# Patient Record
Sex: MALE | Race: BLACK OR AFRICAN AMERICAN | Employment: OTHER | URBAN - METROPOLITAN AREA
[De-identification: names, ages, dates, MRNs, and addresses within clinical notes are randomized per-mention and may not be internally consistent; named-entity substitution may affect disease eponyms.]

---

## 2017-01-05 ENCOUNTER — HOSPITAL ENCOUNTER (OUTPATIENT)
Dept: BONE DENSITY | Age: 82
Discharge: HOME | End: 2017-01-05
Attending: STUDENT IN AN ORGANIZED HEALTH CARE EDUCATION/TRAINING PROGRAM
Payer: MEDICARE

## 2017-01-05 DIAGNOSIS — R26.9 ABNORMALITY OF GAIT: ICD-10-CM

## 2017-01-05 DIAGNOSIS — M84.48XA: ICD-10-CM

## 2017-01-05 PROCEDURE — 77080 DXA BONE DENSITY AXIAL: CPT

## 2017-10-07 ENCOUNTER — APPOINTMENT (OUTPATIENT)
Dept: GENERAL RADIOLOGY | Age: 82
End: 2017-10-07
Attending: PHYSICIAN ASSISTANT
Payer: MEDICARE

## 2017-10-07 ENCOUNTER — HOSPITAL ENCOUNTER (EMERGENCY)
Age: 82
Discharge: HOME | End: 2017-10-07
Attending: EMERGENCY MEDICINE
Payer: MEDICARE

## 2017-10-07 VITALS
HEART RATE: 79 BPM | HEIGHT: 65 IN | DIASTOLIC BLOOD PRESSURE: 82 MMHG | OXYGEN SATURATION: 100 % | BODY MASS INDEX: 25.34 KG/M2 | RESPIRATION RATE: 16 BRPM | WEIGHT: 152.12 LBS | SYSTOLIC BLOOD PRESSURE: 139 MMHG | TEMPERATURE: 98.6 F

## 2017-10-07 DIAGNOSIS — S22.41XA CLOSED FRACTURE OF MULTIPLE RIBS OF RIGHT SIDE, INITIAL ENCOUNTER: Primary | ICD-10-CM

## 2017-10-07 PROCEDURE — 99282 EMERGENCY DEPT VISIT SF MDM: CPT

## 2017-10-07 PROCEDURE — 71101 X-RAY EXAM UNILAT RIBS/CHEST: CPT

## 2017-10-07 RX ORDER — OXYCODONE AND ACETAMINOPHEN 5; 325 MG/1; MG/1
1 TABLET ORAL
Qty: 12 TAB | Refills: 0 | Status: SHIPPED | OUTPATIENT
Start: 2017-10-07 | End: 2017-10-24

## 2017-10-07 NOTE — ED NOTES
PA has reviewed discharge instructions with the patient and spouse. The patient and spouse verbalized understanding. Pt ambulatory home with wife.

## 2017-10-07 NOTE — ED PROVIDER NOTES
[facility] [address]  EMERGENCY DEPARTMENT HISTORY AND PHYSICAL EXAM         Date of Service: 10/7/2017   Patient Name: Kyleigh Hollis   YOB: 1932  Medical Record Number: [id number]    History of Presenting Illness     Chief Complaint   Patient presents with    Rib Injury     right rib cage pain when he fell out of bed yesterday evening when he had a bad dream         History Provided By:  patient    Additional History:   Kyleigh Hollis is a 80 y.o. male with PMhx significant for HTN, HLD and DM who presents ambulatory to the ED with cc of R rib pain s/p fall yesterday afternoon. Pt reports he woke up from a nap and fell out of his bed. Social Hx: - Tobacco, - EtOH, - Illicit Drugs    There are no other complaints, changes or physical findings at this time. Primary Care Provider: Janeth Ontiveros MD   Specialist:    Past History     Past Medical History:   Past Medical History:   Diagnosis Date    Diabetes ([facility] [address]     Gastrointestinal disorder     Hypertension     Other ill-defined conditions(799.89)     gout, glaucoma    Other ill-defined conditions(799.89)     high cholesterol        Past Surgical History:   Past Surgical History:   Procedure Laterality Date    HX ORTHOPAEDIC      cervical surgery        Family History:   No family history on file. Social History:   Social History   Substance Use Topics    Smoking status: Never Smoker    Smokeless tobacco: Not on file    Alcohol use No      Comment: quit        Allergies:   No Known Allergies     Review of Systems   Review of Systems   Constitutional: Negative. Negative for activity change, appetite change, chills, fatigue, fever and unexpected weight change. HENT: Negative. Negative for congestion, hearing loss, rhinorrhea, sneezing and voice change. Eyes: Negative. Negative for pain and visual disturbance. Respiratory: Positive for shortness of breath.  Negative for apnea, cough, choking and chest tightness. Negative for hemoptysis. Cardiovascular: Negative. Negative for chest pain and palpitations. Gastrointestinal: Negative. Negative for abdominal distention, abdominal pain, blood in stool, diarrhea, nausea and vomiting. Genitourinary: Negative. Negative for difficulty urinating, flank pain, frequency and urgency. No discharge   Musculoskeletal: Negative for arthralgias, back pain and neck stiffness. Positive for R rib pain. Skin: Negative. Negative for color change and rash. Neurological: Negative. Negative for dizziness, seizures, syncope, speech difficulty, weakness, numbness and headaches. Hematological: Negative for adenopathy. Psychiatric/Behavioral: Negative. Negative for agitation, behavioral problems, dysphoric mood and suicidal ideas. The patient is not nervous/anxious. Physical Exam  Physical Exam   Constitutional: He is oriented to person, place, and time. He appears well-developed and well-nourished. No distress. HENT:   Head: Normocephalic and atraumatic. Right Ear: External ear normal.   Left Ear: External ear normal.   Nose: Nose normal.   Mouth/Throat: Oropharynx is clear and moist. No oropharyngeal exudate. Eyes: Conjunctivae and EOM are normal. Pupils are equal, round, and reactive to light. Right eye exhibits no discharge. Left eye exhibits no discharge. No scleral icterus. Neck: Normal range of motion. Neck supple. No JVD present. No tracheal deviation present. Cardiovascular: Normal rate, regular rhythm, normal heart sounds and intact distal pulses. Exam reveals no gallop and no friction rub. No murmur heard. Pulmonary/Chest: Effort normal and breath sounds normal. No respiratory distress. He has no wheezes. He has no rales. He exhibits no tenderness. Abdominal: Soft. Bowel sounds are normal. He exhibits no distension and no mass. There is no tenderness.  There is no rebound and no guarding. Musculoskeletal: Normal range of motion. He exhibits no edema or tenderness. No gross deformity. No ecchymosis. Minimal tenderness to posterior lateral ribs. Lymphadenopathy:     He has no cervical adenopathy. Neurological: He is alert and oriented to person, place, and time. He has normal reflexes. No cranial nerve deficit. He exhibits normal muscle tone. Coordination normal.   Skin: Skin is warm and dry. He is not diaphoretic. Psychiatric: He has a normal mood and affect. His behavior is normal. Judgment and thought content normal.   Nursing note and vitals reviewed. Medical Decision Making   I am the first provider for this patient. I reviewed the vital signs, available nursing notes, past medical history, past surgical history, family history and social history. Provider Notes:   DDx: fracture, pneumothorax     ED Course:  2:32 PM   Initial assessment performed. The patients presenting problems have been discussed, and they are in agreement with the care plan formulated and outlined with them. I have encouraged them to ask questions as they arise throughout their visit. Diagnostic Study Results     Radiologic Studies -  The following have been ordered and reviewed:  XR RIBS RT W PA CXR MIN 3 V   Final Result   EXAM:  XR RIBS RT W PA CXR MIN 3 V     INDICATION:   Right rib pain. Fall from bed yesterday.     COMPARISON: 7.24.2014     FINDINGS: A frontal radiograph of the chest and 3 oblique views of the right  ribs demonstrate a healed right anterior fifth rib fracture. There are acute  lateral 10th and 11th rib fractures. There is no pneumothorax or pleural  effusion. Multiple radiopaque structures overlie the left axilla, previously  seen.     IMPRESSION  IMPRESSION:       Acute right lateral 10th and 11th rib fractures  No pneumothorax  Retained metallic fragments left axilla, present in 2014            Vital Signs-Reviewed the patient's vital signs.    Patient Vitals for the past 12 hrs:   Temp Pulse Resp BP SpO2   10/07/17 1417 98.6 °F (37 °C) 79 16 139/82 100 %       Medications Given in the ED:  Medications - No data to display    Diagnosis:  Clinical Impression:   1. Closed fracture of multiple ribs of right side, initial encounter         Plan:  1:   Follow-up Information     Follow up With Details Comments Contact Info    Shelbi Llanos MD In 2 days As needed [facility]  [phone]            2:   Discharge Medication List as of 10/7/2017  3:05 PM      START taking these medications    Details   oxyCODONE-acetaminophen (PERCOCET) 5-325 mg per tablet Take 1 Tab by mouth every six (6) hours as needed for Pain. Max Daily Amount: 4 Tabs., Print, Disp-12 Tab, R-0         CONTINUE these medications which have NOT CHANGED    Details   naproxen (NAPROSYN) 500 mg tablet Take 1 Tab by mouth every twelve (12) hours as needed for Pain., Print, Disp-20 Tab, R-0      insulin NPH (NOVOLIN N, HUMULIN N) 100 unit/mL injection 12 units in AM & 8 units in PM, No Print, Disp-1 Vial, R-0      metoprolol (LOPRESSOR) 50 mg tablet Take 1 Tab by mouth two (2) times a day., Print, Disp-60 Tab, R-0      aspirin delayed-release 325 mg tablet Take 1 Tab by mouth daily. , Print, Disp-30 Tab, R-0      busPIRone (BUSPAR) 30 mg tablet Take 30 mg by mouth two (2) times a day., Historical Med      clonazePAM (KLONOPIN) 1 mg tablet Take 1 mg by mouth nightly as needed (sleep, anxiety). , Historical Med      senna (SENNA) 8.6 mg tablet Take 2 Tabs by mouth nightly., Historical Med      gabapentin (NEURONTIN) 300 mg capsule Take 300 mg by mouth three (3) times daily. , Historical Med      omeprazole (PRILOSEC) 20 mg capsule Take 20 mg by mouth daily. , Historical Med      HYDROcodone-acetaminophen (NORCO)  mg tablet Take 1 Tab by mouth every four (4) hours as needed for Pain., Historical Med      sertraline (ZOLOFT) 100 mg tablet Take 200 mg by mouth daily. , Historical Med      cyanocobalamin (VITAMIN B-12) 1,000 mcg tablet Take 1,000 mcg by mouth daily. , Historical Med      latanoprost (XALATAN) 0.005 % ophthalmic solution Administer 1 Drop to right eye nightly., Historical Med      simvastatin (ZOCOR) 40 mg tablet Take  by mouth nightly.  , Historical Med      docusate sodium (COLACE) 100 mg capsule Take 100 mg by mouth two (2) times a day.  , Historical Med           Return to ED if worse. Disposition:  DISCHARGE NOTE  3:05 PM  The patient has been re-evaluated and is ready for discharge. Reviewed available results with patient. Counseled pt on diagnosis and care plan. Pt has expressed understanding, and all questions have been answered. Pt agrees with plan and agrees to F/U as recommended, or return to the ED if their sxs worsen. Discharge instructions have been provided and explained to the pt, along with reasons to return to the ED.    _______________________________     Attestations: This note is prepared by Marino Stevens, acting as Scribe for [employer] The scribe's documentation has been prepared under my direction and personally reviewed by me in its entirety. I confirm that the note above accurately reflects all work, treatment, procedures, and medical decision making performed by me.   RANDALL Gary    _______________________________

## 2017-10-07 NOTE — ED TRIAGE NOTES
Pt arrived ambulatory to ED with c/o right rib/chest pain s/p falling out of the bed last night. Pt states pain is 9/10 and is worse when moving. Pt states he had a nightmare and rolled out of the bed. Pt denies LOC, though does think he hit his head because he has a small bump on right temporal. Pt in no acute distress. VSS.

## 2017-10-24 ENCOUNTER — APPOINTMENT (OUTPATIENT)
Dept: GENERAL RADIOLOGY | Age: 82
End: 2017-10-24
Attending: EMERGENCY MEDICINE
Payer: MEDICARE

## 2017-10-24 ENCOUNTER — HOSPITAL ENCOUNTER (EMERGENCY)
Age: 82
Discharge: HOME | End: 2017-10-24
Attending: EMERGENCY MEDICINE | Admitting: EMERGENCY MEDICINE
Payer: MEDICARE

## 2017-10-24 VITALS
DIASTOLIC BLOOD PRESSURE: 62 MMHG | BODY MASS INDEX: 24.79 KG/M2 | SYSTOLIC BLOOD PRESSURE: 119 MMHG | HEIGHT: 65 IN | HEART RATE: 98 BPM | WEIGHT: 148.81 LBS | TEMPERATURE: 97.8 F | RESPIRATION RATE: 16 BRPM | OXYGEN SATURATION: 100 %

## 2017-10-24 DIAGNOSIS — M47.22 OSTEOARTHRITIS OF SPINE WITH RADICULOPATHY, CERVICAL REGION: ICD-10-CM

## 2017-10-24 DIAGNOSIS — M19.031 OSTEOARTHRITIS OF RIGHT WRIST, UNSPECIFIED OSTEOARTHRITIS TYPE: Primary | ICD-10-CM

## 2017-10-24 PROCEDURE — 74011250637 HC RX REV CODE- 250/637: Performed by: EMERGENCY MEDICINE

## 2017-10-24 PROCEDURE — 99283 EMERGENCY DEPT VISIT LOW MDM: CPT

## 2017-10-24 PROCEDURE — 72050 X-RAY EXAM NECK SPINE 4/5VWS: CPT

## 2017-10-24 PROCEDURE — 73110 X-RAY EXAM OF WRIST: CPT

## 2017-10-24 RX ORDER — ACETAMINOPHEN 325 MG/1
975 TABLET ORAL
Status: COMPLETED | OUTPATIENT
Start: 2017-10-24 | End: 2017-10-24

## 2017-10-24 RX ORDER — LIDOCAINE 50 MG/G
PATCH TOPICAL
Qty: 15 EACH | Refills: 0 | Status: ON HOLD | OUTPATIENT
Start: 2017-10-24 | End: 2018-09-05

## 2017-10-24 RX ORDER — LIDOCAINE 50 MG/G
1 PATCH TOPICAL
Status: DISCONTINUED | OUTPATIENT
Start: 2017-10-24 | End: 2017-10-24 | Stop reason: HOSPADM

## 2017-10-24 RX ORDER — HYDROCODONE BITARTRATE AND ACETAMINOPHEN 5; 325 MG/1; MG/1
1 TABLET ORAL
Qty: 12 TAB | Refills: 0 | Status: SHIPPED | OUTPATIENT
Start: 2017-10-24 | End: 2018-06-09

## 2017-10-24 RX ADMIN — ACETAMINOPHEN 975 MG: 325 TABLET ORAL at 19:22

## 2017-10-24 NOTE — ED PROVIDER NOTES
[facility] [address]  EMERGENCY DEPARTMENT HISTORY AND PHYSICAL EXAM       Date of Service: 10/24/2017   Patient Name: Ayla Vo   YOB: 1932  Medical Record Number: [id number]    History of Presenting Illness     Chief Complaint   Patient presents with    Hand Pain     R hand pain radiating into his R wrist.  Patient denies injury. Patient states this has been hurting him for over a year        History Provided By:  patient and friend    Additional History:   Ayla Vo is a 80 y.o. male with PMHx significant for DM / HTN / Gout / Glaucoma / High cholesterol who presents ambulatory to the ED with cc of sharp right hand pain that radiates into his right wrist x over a year, worse today. He notes the presence of a painful cyst to the dorsum of his right hand which his PCP has said was \"fat\". Pt endorses that pain constantly radiates into his right shoulder and the right-sided paraspinal muscles in his neck. Per friend, pt had surgery on his neck in 2002. Pt has followed up with his PCP for pain, with PCP not recommending any further alternatives or course of action other than surgery. He denies seeing a spine specialist recently. Pt specifically denies fever, vomiting, chest pain, or SOB. Social Hx: - Tobacco, - EtOH, - Illicit Drugs    There are no other complaints, changes or physical findings at this time. Primary Care Provider: Ann Marie Grullon MD       Past History     Past Medical History:   Past Medical History:   Diagnosis Date    Diabetes ([facility] [address]     Gastrointestinal disorder     Hypertension     Other ill-defined conditions(799.89)     gout, glaucoma    Other ill-defined conditions(799.89)     high cholesterol        Past Surgical History:   Past Surgical History:   Procedure Laterality Date    HX ORTHOPAEDIC      cervical surgery        Family History:   No family history on file.      Social History:   Social History Substance Use Topics    Smoking status: Never Smoker    Smokeless tobacco: Not on file    Alcohol use No      Comment: quit        Allergies:   No Known Allergies      Review of Systems   Review of Systems   Constitutional: Negative for chills and fever. HENT: Negative for congestion. Eyes: Negative for visual disturbance. Respiratory: Negative for chest tightness and shortness of breath. Cardiovascular: Negative for chest pain and leg swelling. Gastrointestinal: Negative for abdominal pain and vomiting. Endocrine: Negative for polyuria. Genitourinary: Negative for dysuria and frequency. Musculoskeletal: Positive for neck pain. Positive for right shoulder, wrist, hand and neck pain   Skin: Negative for color change. Allergic/Immunologic: Negative for immunocompromised state. Neurological: Negative for numbness. Physical Exam  Physical Exam   Nursing note and vitals reviewed.   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, conjunctiva normal, anicteric sclera  HEENT: mucous membranes moist, oropharynx is clear, edentulous, tongue midline, face symmetric, well-healed scar to the posterior C-spine, muscle spasm to the right lateral C-spine, pain to the right cervical spine with right cervical external rotation  Pulmonary: clear to auscultation bilaterally  Cardiac: normal rate and regular rhythm, no murmurs, gallops, or rubs, 2+DP pulses, 2+ radial pulses  Abdomen: soft, nontender, nondistended, bowel sounds present  MSK: no pre-tibial edema, mild tenderness to palpation of the right metacarpal joint, patient lifts right arm with right shoulder elevation, right wrist ROM preserved with pronation, supination, flexion and extension  Neuro: Alert, answers questions appropriately, + right Spurling's test, IR right shoulder 5/5, ER right shoulder 4/5,  symmetric, gait stable w/ cane (baseline)  Skin: capillary refill brisk, suspect ganglion cyst to the right dorsal hand    Medical Decision Making   I am the first provider for this patient. I reviewed the vital signs, available nursing notes, past medical history, past surgical history, family history and social history. Old Medical Records: Old medical records. Nursing notes. Previous radiology studies. Provider Notes:   DDx: Ganglion cyst, Cervical radiculopathy, R wrist OA, Muscular spasm. Critical Care Time:   0 minutes    ED Course:  6:23 PM   Initial assessment performed. The patients presenting problems have been discussed, and they are in agreement with the care plan formulated and outlined with them. I have encouraged them to ask questions as they arise throughout their visit. Progress Notes:   PROGRESS NOTE:  8:30 PM  Pt reevaluated. Pt is ready for discharge. Written by Shy Elkins. Norman Jo ED Scribe, as dictated by Arcelia Mckeon. Bibiana Zambrano MD    Diagnostic Study Results     Radiologic Studies -  The following have been ordered and reviewed:  XR WRIST RT AP/LAT/OBL MIN 3V   Final Result   EXAM: XR WRIST RT AP/LAT/OBL MIN 3V     INDICATION:  R wrist pain, ?ganglion cyst.     COMPARISON: None.     FINDINGS: Three  views of the right wrist demonstrate no fracture or other acute  osseous or articular abnormality. There is extensive arthritic change in the  wrists, with extensive cystic changes in the carpal bones and hypertrophic  change at the base of the thumb. .  There is also involvement of the first  through third MP joints. Extensive vascular calcification is noted.     IMPRESSION   IMPRESSION:  Extensive degenerative changes and vascular calcification. No  acute findings. .   XR SPINE CERV 4 OR 5 V   Final Result   History: Right upper extremity radiculopathy.     AP, lateral, bilateral oblique, and odontoid radiographs of the cervical spine  demonstrate fusion at C5-6.  There is disc space narrowing from C4 through C7. There is anterior osteophyte formation. There is uncinate process hypertrophy  with narrowing of the left seventh-T1 neural foramen.     IMPRESSION  IMPRESSION: Status post fusion. Degenerative disc disease. Vital Signs-Reviewed the patient's vital signs. Patient Vitals for the past 12 hrs:   Temp Pulse Resp BP SpO2   10/24/17 1642 97.8 °F (36.6 °C) 98 16 119/62 100 %       Medications Given in the ED:  Medications   lidocaine (LIDODERM) 5 % patch 1 Patch (1 Patch TransDERmal Apply Patch 10/24/17 1920)   acetaminophen (TYLENOL) tablet 975 mg (975 mg Oral Given 10/24/17 1922)       Pulse Oximetry Analysis - Normal 100% on room air     Diagnosis   Clinical Impression:   1. Osteoarthritis of right wrist, unspecified osteoarthritis type    2. Osteoarthritis of spine with radiculopathy, cervical region         Plan:  1:   Follow-up Information     Follow up With Details Comments Contact Info    Krysta Marie MD Schedule an appointment as soon as possible for a visit in 3 days  [facility] [address]  [phone]      \[facility]\"" EMERGENCY DEPT Go in 1 day If symptoms worsen [address] Lokesh Burns MD Schedule an appointment as soon as possible for a visit in 1 week [facility] [address]  [phone]          2:   Discharge Medication List as of 10/24/2017  8:32 PM      START taking these medications    Details   HYDROcodone-acetaminophen (LORTAB 5-325) 5-325 mg per tablet Take 1 Tab by mouth every six (6) hours as needed for Pain (breakthrough pain). Max Daily Amount: 4 Tabs.  Indications: causes drowsiness;do not drink,drive, or use machinery while taking; take with a stool softener, Print, Disp-12 Tab, R-0      lidocaine (LIDODERM) 5 % Apply patch to the affected area for 12 hours a day and remove for 12 hours a day., Print, Disp-15 Each, R-0 CONTINUE these medications which have NOT CHANGED    Details   naproxen (NAPROSYN) 500 mg tablet Take 1 Tab by mouth every twelve (12) hours as needed for Pain., Print, Disp-20 Tab, R-0      insulin NPH (NOVOLIN N, HUMULIN N) 100 unit/mL injection 12 units in AM & 8 units in PM, No Print, Disp-1 Vial, R-0      metoprolol (LOPRESSOR) 50 mg tablet Take 1 Tab by mouth two (2) times a day., Print, Disp-60 Tab, R-0      aspirin delayed-release 325 mg tablet Take 1 Tab by mouth daily. , Print, Disp-30 Tab, R-0      busPIRone (BUSPAR) 30 mg tablet Take 30 mg by mouth two (2) times a day., Historical Med      clonazePAM (KLONOPIN) 1 mg tablet Take 1 mg by mouth nightly as needed (sleep, anxiety). , Historical Med      senna (SENNA) 8.6 mg tablet Take 2 Tabs by mouth nightly., Historical Med      gabapentin (NEURONTIN) 300 mg capsule Take 300 mg by mouth three (3) times daily. , Historical Med      omeprazole (PRILOSEC) 20 mg capsule Take 20 mg by mouth daily. , Historical Med      sertraline (ZOLOFT) 100 mg tablet Take 200 mg by mouth daily. , Historical Med      cyanocobalamin (VITAMIN B-12) 1,000 mcg tablet Take 1,000 mcg by mouth daily. , Historical Med      latanoprost (XALATAN) 0.005 % ophthalmic solution Administer 1 Drop to right eye nightly., Historical Med      simvastatin (ZOCOR) 40 mg tablet Take  by mouth nightly.  , Historical Med      docusate sodium (COLACE) 100 mg capsule Take 100 mg by mouth two (2) times a day.  , Historical Med         STOP taking these medications       oxyCODONE-acetaminophen (PERCOCET) 5-325 mg per tablet Comments:   Reason for Stopping:         HYDROcodone-acetaminophen (NORCO)  mg tablet Comments:   Reason for Stopping:             Return to ED if Worse    Disposition Note:    DISCHARGE NOTE:  8:35 PM  The patient's results have been reviewed with family and/or caregiver.  They verbally convey their understanding and agreement of the patient's signs, symptoms, diagnosis, treatment, and prognosis. They additionally agree to follow up as recommended in the discharge instructions or to return to the Emergency Room should the patient's condition change prior to their follow-up appointment. The family and/or caregiver verbally agrees with the care-plan and all of their questions have been answered. The discharge instructions have also been provided to the them along with educational information regarding the patient's diagnosis and a list of reasons why the patient would want to return to the ER prior to their follow-up appointment should their condition change. Written by Mina Esposito. Cristy Clay ED Scribe, as dictated by Fady Irizarry. Betzy Rosas MD.  _______________________________   Attestations: This note is prepared by Mina Esposito. Braulio, acting as Scribe for Fady Irizarry. Betzy Rosas MD.    Fady Irizarry. Betzy Rosas MD: The scribe's documentation has been prepared under my direction and personally reviewed by me in its entirety.  I confirm that the note above accurately reflects all work, treatment, procedures, and medical decision making performed by me.   _______________________________

## 2017-10-24 NOTE — ED NOTES
Bedside shift change report given to RN Rohan Hoang (oncoming nurse) by CURT Griffin (offgoing nurse). Report included the following information SBAR, ED Summary and MAR.

## 2017-10-25 NOTE — ED NOTES
Sampson Sahu MD   has done a bedside review of the discharge instructions. The patient is in understanding. The patients line(s) are removed. The patient is dressed, and belongings together for discharge.

## 2017-10-25 NOTE — DISCHARGE INSTRUCTIONS
Osteoarthritis: Care Instructions  Your Care Instructions    Arthritis is a common health problem in which the joints are inflamed. There are several kinds of arthritis. Osteoarthritis is caused by a breakdown of cartilage, the hard, thick tissue that cushions the joints. It causes pain, stiffness, and swelling, often in the spine, fingers, hips, and knees. Osteoarthritis can happen at any age, but it is most common in older people. Osteoarthritis never goes away completely, but it can be controlled. Medicine and home treatment can reduce the pain and prevent the arthritis from getting worse. Follow-up care is a key part of your treatment and safety. Be sure to make and go to all appointments, and call your doctor if you are having problems. It's also a good idea to know your test results and keep a list of the medicines you take. How can you care for yourself at home? · Take a warm shower or bath in the morning to relieve stiffness. Avoid sitting still afterwards. · If the joint is not swollen, use moist heat, like a warm, damp towel, for 20 to 30 minutes, 2 or 3 times a day. Do not use heat on a swollen joint. · If the joint is swollen, use ice or cold packs for 10 to 20 minutes, once an hour. Cold will help relieve pain and reduce inflammation. Put a thin cloth between the ice and your skin. · To prevent stiffness, gently move the joint through its full range of motion several times a day. · If the joint hurts, avoid activities that put a strain on it for a few days. Take rest breaks throughout the day. · Get regular exercise. Walking, swimming, yoga, biking, david chi, and water aerobics are good exercises that are gentle on the joints. · Reach and stay at a healthy weight. If you need to lose or maintain weight, regular exercise and a healthy diet will help. Extra weight can strain the joints, especially the knees and hips, and make the pain worse. Losing even a few pounds may help.   · Take pain medicines exactly as directed. ¨ If the doctor gave you a prescription medicine for pain, take it as prescribed. ¨ If you are not taking a prescription pain medicine, ask your doctor if you can take an over-the-counter medicine. When should you call for help? Call your doctor now or seek immediate medical care if:  · The pain is so bad that you cannot use the joint. · You have sudden back pain with weakness in your legs or loss of bowel or bladder control. · Your stools are black and tarlike or have streaks of blood. · You have severe pain and swelling in more than one joint. Watch closely for changes in your health, and be sure to contact your doctor if:  · You have side effects from the medicines, like belly pain, ongoing heartburn, or nausea. · Joint pain continues for more than 6 weeks, and home treatment is not helping. Where can you learn more? Go to http://marandaCater to uwilly.info/. Enter J423 in the search box to learn more about \"Osteoarthritis: Care Instructions. \"  Current as of: November 28, 2016  Content Version: 11.3  © 9048-3823 [employer]. Care instructions adapted under license by [employer] (which disclaims liability or warranty for this information). If you have questions about a medical condition or this instruction, always ask your healthcare professional. [address] any warranty or liability for your use of this information. Pinched Nerve in the Neck: Care Instructions  Your Care Instructions  A pinched nerve in the neck happens when a vertebra or disc in the upper part of your spine is damaged. This damage can happen because of an injury. Or it can just happen with age. The changes caused by the damage may put pressure on a nearby nerve root, pinching it. This causes symptoms such as sharp pain in your neck, shoulder, arm, or back. You may also have tingling or numbness. Sometimes it makes your arm weaker.  The symptoms are usually worse when you turn your head or strain your neck. For many people, the symptoms get better over time and finally go away. Early treatment usually includes medicines for pain and swelling. Sometimes physical therapy and special exercises may help. Follow-up care is a key part of your treatment and safety. Be sure to make and go to all appointments, and call your doctor if you are having problems. It's also a good idea to know your test results and keep a list of the medicines you take. How can you care for yourself at home? · Be safe with medicines. Read and follow all instructions on the label. ¨ If the doctor gave you a prescription medicine for pain, take it as prescribed. ¨ If you are not taking a prescription pain medicine, ask your doctor if you can take an over-the-counter medicine. · Try using a heating pad on a low or medium setting for 15 to 20 minutes every 2 or 3 hours. Try a warm shower in place of one session with the heating pad. You can also buy single-use heat wraps that last up to 8 hours. · You can also try an ice pack for 10 to 15 minutes every 2 to 3 hours. There isn't strong evidence that either heat or ice will help. But you can try them to see if they help you. · Don't spend too long in one position. Take short breaks to move around and change positions. · Wear a seat belt and shoulder harness when you are in a car. · Sleep with a pillow under your head and neck that keeps your neck straight. · If you were given a neck brace (cervical collar) to limit neck motion, wear it as instructed for as many days as your doctor tells you to. Do not wear it longer than you were told to. Wearing a brace for too long can lead to neck stiffness and can weaken the neck muscles. · Follow your doctor's instructions for gentle neck-stretching exercises. · Do not smoke. Smoking can slow healing of your discs.  If you need help quitting, talk to your doctor about stop-smoking programs and medicines. These can increase your chances of quitting for good. · Avoid strenuous work or exercise until your doctor says it is okay. When should you call for help? Call 911 anytime you think you may need emergency care. For example, call if:  · You are unable to move an arm or a leg at all. Call your doctor now or seek immediate medical care if:  · You have new or worse symptoms in your arms, legs, chest, belly, or buttocks. Symptoms may include:  ¨ Numbness or tingling. ¨ Weakness. ¨ Pain. · You lose bladder or bowel control. Watch closely for changes in your health, and be sure to contact your doctor if:  · You are not getting better as expected. Where can you learn more? Go to http://maranda-willy.info/. Enter O876 in the search box to learn more about \"Pinched Nerve in the Neck: Care Instructions. \"  Current as of: March 21, 2017  Content Version: 11.3  © 9134-7908 [employer]. Care instructions adapted under license by [employer] (which disclaims liability or warranty for this information). If you have questions about a medical condition or this instruction, always ask your healthcare professional. [place] [address] any warranty or liability for your use of this information.

## 2018-05-12 ENCOUNTER — APPOINTMENT (OUTPATIENT)
Dept: CT IMAGING | Age: 83
End: 2018-05-12
Attending: EMERGENCY MEDICINE
Payer: MEDICARE

## 2018-05-12 ENCOUNTER — HOSPITAL ENCOUNTER (EMERGENCY)
Age: 83
Discharge: HOME | End: 2018-05-12
Attending: EMERGENCY MEDICINE
Payer: MEDICARE

## 2018-05-12 ENCOUNTER — APPOINTMENT (OUTPATIENT)
Dept: GENERAL RADIOLOGY | Age: 83
End: 2018-05-12
Attending: PHYSICIAN ASSISTANT
Payer: MEDICARE

## 2018-05-12 VITALS
WEIGHT: 153.66 LBS | BODY MASS INDEX: 25.57 KG/M2 | SYSTOLIC BLOOD PRESSURE: 148 MMHG | TEMPERATURE: 97.8 F | RESPIRATION RATE: 17 BRPM | DIASTOLIC BLOOD PRESSURE: 90 MMHG | OXYGEN SATURATION: 100 % | HEART RATE: 55 BPM

## 2018-05-12 DIAGNOSIS — R51.9 ACUTE NONINTRACTABLE HEADACHE, UNSPECIFIED HEADACHE TYPE: Primary | ICD-10-CM

## 2018-05-12 LAB
ALBUMIN SERPL-MCNC: 3.6 G/DL (ref 3.5–5)
ALBUMIN/GLOB SERPL: 1 {RATIO} (ref 1.1–2.2)
ALP SERPL-CCNC: 144 U/L (ref 45–117)
ALT SERPL-CCNC: 50 U/L (ref 12–78)
ANION GAP SERPL CALC-SCNC: 6 MMOL/L (ref 5–15)
AST SERPL-CCNC: 54 U/L (ref 15–37)
BASOPHILS # BLD: 0 K/UL (ref 0–0.1)
BASOPHILS NFR BLD: 1 % (ref 0–1)
BILIRUB SERPL-MCNC: 0.5 MG/DL (ref 0.2–1)
BUN SERPL-MCNC: 13 MG/DL (ref 6–20)
BUN/CREAT SERPL: 8 (ref 12–20)
CALCIUM SERPL-MCNC: 8.5 MG/DL (ref 8.5–10.1)
CHLORIDE SERPL-SCNC: 106 MMOL/L (ref 97–108)
CK MB CFR SERPL CALC: 1.3 % (ref 0–2.5)
CK MB SERPL-MCNC: 5.4 NG/ML (ref 5–25)
CK SERPL-CCNC: 406 U/L (ref 39–308)
CO2 SERPL-SCNC: 27 MMOL/L (ref 21–32)
CREAT SERPL-MCNC: 1.73 MG/DL (ref 0.7–1.3)
DIFFERENTIAL METHOD BLD: ABNORMAL
EOSINOPHIL # BLD: 0.1 K/UL (ref 0–0.4)
EOSINOPHIL NFR BLD: 3 % (ref 0–7)
ERYTHROCYTE [DISTWIDTH] IN BLOOD BY AUTOMATED COUNT: 12.4 % (ref 11.5–14.5)
GLOBULIN SER CALC-MCNC: 3.7 G/DL (ref 2–4)
GLUCOSE SERPL-MCNC: 142 MG/DL (ref 65–100)
HCT VFR BLD AUTO: 34.6 % (ref 36.6–50.3)
HGB BLD-MCNC: 11.8 G/DL (ref 12.1–17)
IMM GRANULOCYTES # BLD: 0 K/UL (ref 0–0.04)
IMM GRANULOCYTES NFR BLD AUTO: 0 % (ref 0–0.5)
LYMPHOCYTES # BLD: 1.6 K/UL (ref 0.8–3.5)
LYMPHOCYTES NFR BLD: 38 % (ref 12–49)
MCH RBC QN AUTO: 30.3 PG (ref 26–34)
MCHC RBC AUTO-ENTMCNC: 34.1 G/DL (ref 30–36.5)
MCV RBC AUTO: 88.9 FL (ref 80–99)
MONOCYTES # BLD: 0.4 K/UL (ref 0–1)
MONOCYTES NFR BLD: 9 % (ref 5–13)
NEUTS SEG # BLD: 2.1 K/UL (ref 1.8–8)
NEUTS SEG NFR BLD: 49 % (ref 32–75)
NRBC # BLD: 0 K/UL (ref 0–0.01)
NRBC BLD-RTO: 0 PER 100 WBC
PLATELET # BLD AUTO: 154 K/UL (ref 150–400)
PMV BLD AUTO: 10 FL (ref 8.9–12.9)
POTASSIUM SERPL-SCNC: 4.3 MMOL/L (ref 3.5–5.1)
PROT SERPL-MCNC: 7.3 G/DL (ref 6.4–8.2)
RBC # BLD AUTO: 3.89 M/UL (ref 4.1–5.7)
SODIUM SERPL-SCNC: 139 MMOL/L (ref 136–145)
TROPONIN I SERPL-MCNC: <0.04 NG/ML
WBC # BLD AUTO: 4.2 K/UL (ref 4.1–11.1)

## 2018-05-12 PROCEDURE — 84484 ASSAY OF TROPONIN QUANT: CPT | Performed by: PHYSICIAN ASSISTANT

## 2018-05-12 PROCEDURE — 74011250637 HC RX REV CODE- 250/637: Performed by: EMERGENCY MEDICINE

## 2018-05-12 PROCEDURE — 80053 COMPREHEN METABOLIC PANEL: CPT | Performed by: PHYSICIAN ASSISTANT

## 2018-05-12 PROCEDURE — 96360 HYDRATION IV INFUSION INIT: CPT

## 2018-05-12 PROCEDURE — 99284 EMERGENCY DEPT VISIT MOD MDM: CPT

## 2018-05-12 PROCEDURE — 82550 ASSAY OF CK (CPK): CPT | Performed by: PHYSICIAN ASSISTANT

## 2018-05-12 PROCEDURE — 74011250636 HC RX REV CODE- 250/636: Performed by: EMERGENCY MEDICINE

## 2018-05-12 PROCEDURE — 85025 COMPLETE CBC W/AUTO DIFF WBC: CPT | Performed by: PHYSICIAN ASSISTANT

## 2018-05-12 PROCEDURE — 93005 ELECTROCARDIOGRAM TRACING: CPT

## 2018-05-12 PROCEDURE — 36415 COLL VENOUS BLD VENIPUNCTURE: CPT | Performed by: PHYSICIAN ASSISTANT

## 2018-05-12 PROCEDURE — 70450 CT HEAD/BRAIN W/O DYE: CPT

## 2018-05-12 PROCEDURE — 71045 X-RAY EXAM CHEST 1 VIEW: CPT

## 2018-05-12 RX ORDER — NAPROXEN 250 MG/1
250 TABLET ORAL
Qty: 6 TAB | Refills: 0 | Status: ON HOLD | OUTPATIENT
Start: 2018-05-12 | End: 2018-09-05

## 2018-05-12 RX ORDER — NAPROXEN 250 MG/1
250 TABLET ORAL 2 TIMES DAILY WITH MEALS
Status: DISCONTINUED | OUTPATIENT
Start: 2018-05-12 | End: 2018-05-12 | Stop reason: HOSPADM

## 2018-05-12 RX ADMIN — NAPROXEN 250 MG: 250 TABLET ORAL at 16:48

## 2018-05-12 RX ADMIN — SODIUM CHLORIDE 500 ML: 900 INJECTION, SOLUTION INTRAVENOUS at 17:46

## 2018-05-12 NOTE — DISCHARGE INSTRUCTIONS

## 2018-05-12 NOTE — ED PROVIDER NOTES
EMERGENCY DEPARTMENT HISTORY AND PHYSICAL EXAM      Date: 5/12/2018  Patient Name: Milagro Morales     PROVIDER IN TRIAGE NOTE:  3:34 PM  DONNA Bey  has evaluated the patient as the Provider in Triage (PIT) for headache and eye pain that radiates to the left neck and chest. The vital signs and the triage nurse assessment have been reviewed. The patient and any available family have been advised that the appropriate studies have been ordered to initiate the work up based on the clinical presentation during the assessment. The pt has been advised that they will be accommodated in monitored ED bed as soon as possible. The pt has been requested to contact the triage nurse or PIT immediately if they experiences any changes in their condition during this brief waiting period. History of Presenting Illness     Chief Complaint   Patient presents with    Headache     headached that started yesterday     Chest Pain     started yesterday        History Provided By: Patient    HPI: Milagro Morales, 80 y.o. male with PMHx significant for DM, HTN, presents ambulatory to the ED with cc of a moderate, top to L sided, headache since yesterday. He denies any associated symptoms, but notes a sore L ear pain s/p having it \"washed out\" at [facility] and a mild swelling to his legs. Per triage note, the pt had also complained of CP, but during the exam, the pt denies this. Pt states he was not performing any heavy lifting at the time of the headache, but notes he was \"around the house. \" He denies self treating for the headache, but states he had only taken his regime of chronic medications, to which his wife states it included Percocet. She also reports the pt is currently on ASA daily. 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 Pt denies any nausea, vomiting, fever, or CP.     Chief Complaint: headache  Duration: 1 Days  Timing:  N/A  Location: top and L sided  Quality: N/A  Severity: Moderate  Modifying Factors: N/A  Associated Symptoms: denies any other associated signs or symptoms    There are no other complaints, changes, or physical findings at this time. PCP: Sindhu Gilliam MD    Current Facility-Administered Medications   Medication Dose Route Frequency Provider Last Rate Last Dose    naproxen (NAPROSYN) tablet 250 mg  250 mg Oral BID WITH MEALS Mukul Licea MD   250 mg at 05/12/18 1646     Current Outpatient Prescriptions   Medication Sig Dispense Refill    naproxen (NAPROSYN) 250 mg tablet Take 1 Tab by mouth every twelve (12) hours as needed. 6 Tab 0    HYDROcodone-acetaminophen (LORTAB 5-325) 5-325 mg per tablet Take 1 Tab by mouth every six (6) hours as needed for Pain (breakthrough pain). Max Daily Amount: 4 Tabs. Indications: causes drowsiness;do not drink,drive, or use machinery while taking; take with a stool softener 12 Tab 0    lidocaine (LIDODERM) 5 % Apply patch to the affected area for 12 hours a day and remove for 12 hours a day. 15 Each 0    insulin NPH (NOVOLIN N, HUMULIN N) 100 unit/mL injection 12 units in AM & 8 units in PM 1 Vial 0    metoprolol (LOPRESSOR) 50 mg tablet Take 1 Tab by mouth two (2) times a day. 60 Tab 0    aspirin delayed-release 325 mg tablet Take 1 Tab by mouth daily. 30 Tab 0    busPIRone (BUSPAR) 30 mg tablet Take 30 mg by mouth two (2) times a day.  clonazePAM (KLONOPIN) 1 mg tablet Take 1 mg by mouth nightly as needed (sleep, anxiety).  senna (SENNA) 8.6 mg tablet Take 2 Tabs by mouth nightly.  gabapentin (NEURONTIN) 300 mg capsule Take 300 mg by mouth three (3) times daily.  omeprazole (PRILOSEC) 20 mg capsule Take 20 mg by mouth daily.  sertraline (ZOLOFT) 100 mg tablet Take 200 mg by mouth daily.  cyanocobalamin (VITAMIN B-12) 1,000 mcg tablet Take 1,000 mcg by mouth daily.  latanoprost (XALATAN) 0.005 % ophthalmic solution Administer 1 Drop to right eye nightly.       simvastatin (ZOCOR) 40 mg tablet Take  by mouth nightly.  docusate sodium (COLACE) 100 mg capsule Take 100 mg by mouth two (2) times a day. Past History     Past Medical History:  Past Medical History:   Diagnosis Date    Diabetes ([address]     Gastrointestinal disorder     Hypertension     Other ill-defined conditions(799.89)     gout, glaucoma    Other ill-defined conditions(799.89)     high cholesterol       Past Surgical History:  Past Surgical History:   Procedure Laterality Date    HX ORTHOPAEDIC      cervical surgery       Family History:  History reviewed. No pertinent family history. Social History:  Social History   Substance Use Topics    Smoking status: Never Smoker    Smokeless tobacco: None    Alcohol use No      Comment: quit       Allergies:  No Known Allergies    Review of Systems   Review of Systems   Constitutional: Negative for chills and fever. HENT: Positive for ear pain (+L). Negative for congestion, rhinorrhea, sore throat and trouble swallowing. Eyes: Negative for visual disturbance. Respiratory: Negative for cough, chest tightness and shortness of breath. Cardiovascular: Positive for leg swelling. Negative for chest pain and palpitations. Gastrointestinal: Negative for abdominal pain, blood in stool, constipation, diarrhea, nausea and vomiting. Genitourinary: Negative for decreased urine volume, difficulty urinating, dysuria and frequency. Musculoskeletal: Negative for back pain and neck pain. Skin: Negative for color change and rash. Neurological: Positive for headaches. Negative for dizziness, weakness and light-headedness. Physical Exam   Physical Exam   Constitutional: He is oriented to person, place, and time. Vital signs are normal. He appears well-developed and well-nourished. He does not appear ill. No distress.    HENT:   Mouth/Throat: Oropharynx is clear and moist.   Tenderness in the L temporal scalp   Eyes: Conjunctivae are normal. Neck: Neck supple. Cardiovascular: Normal rate and regular rhythm. Pulmonary/Chest: Effort normal and breath sounds normal. No accessory muscle usage. No respiratory distress. Abdominal: Soft. He exhibits no distension. There is no tenderness. Lymphadenopathy:     He has no cervical adenopathy. Neurological: He is alert and oriented to person, place, and time. He has normal strength. No cranial nerve deficit or sensory deficit. Skin: Skin is warm and dry. Nursing note and vitals reviewed. Diagnostic Study Results     Labs -   Recent Results (from the past 12 hour(s))   EKG, 12 LEAD, INITIAL    Collection Time: 05/12/18  3:36 PM   Result Value Ref Range    Ventricular Rate 64 BPM    Atrial Rate 64 BPM    P-R Interval 190 ms    QRS Duration 66 ms    Q-T Interval 396 ms    QTC Calculation (Bezet) 408 ms    Calculated R Axis -166 degrees    Calculated T Axis 174 degrees    Diagnosis       Normal sinus rhythm  Right superior axis deviation  When compared with ECG of 24-JUL-2014 10:21,  Questionable change in QRS axis  Nonspecific T wave abnormality, worse in Inferior leads     CBC WITH AUTOMATED DIFF    Collection Time: 05/12/18  4:39 PM   Result Value Ref Range    WBC 4.2 4.1 - 11.1 K/uL    RBC 3.89 (L) 4.10 - 5.70 M/uL    HGB 11.8 (L) 12.1 - 17.0 g/dL    HCT 34.6 (L) 36.6 - 50.3 %    MCV 88.9 80.0 - 99.0 FL    MCH 30.3 26.0 - 34.0 PG    MCHC 34.1 30.0 - 36.5 g/dL    RDW 12.4 11.5 - 14.5 %    PLATELET 697 981 - 714 K/uL    MPV 10.0 8.9 - 12.9 FL    NRBC 0.0 0  WBC    ABSOLUTE NRBC 0.00 0.00 - 0.01 K/uL    NEUTROPHILS 49 32 - 75 %    LYMPHOCYTES 38 12 - 49 %    MONOCYTES 9 5 - 13 %    EOSINOPHILS 3 0 - 7 %    BASOPHILS 1 0 - 1 %    IMMATURE GRANULOCYTES 0 0.0 - 0.5 %    ABS. NEUTROPHILS 2.1 1.8 - 8.0 K/UL    ABS. LYMPHOCYTES 1.6 0.8 - 3.5 K/UL    ABS. MONOCYTES 0.4 0.0 - 1.0 K/UL    ABS. EOSINOPHILS 0.1 0.0 - 0.4 K/UL    ABS. BASOPHILS 0.0 0.0 - 0.1 K/UL    ABS. IMM.  GRANS. 0.0 0.00 - 0.04 K/UL DF AUTOMATED     METABOLIC PANEL, COMPREHENSIVE    Collection Time: 05/12/18  4:39 PM   Result Value Ref Range    Sodium 139 136 - 145 mmol/L    Potassium 4.3 3.5 - 5.1 mmol/L    Chloride 106 97 - 108 mmol/L    CO2 27 21 - 32 mmol/L    Anion gap 6 5 - 15 mmol/L    Glucose 142 (H) 65 - 100 mg/dL    BUN 13 6 - 20 MG/DL    Creatinine 1.73 (H) 0.70 - 1.30 MG/DL    BUN/Creatinine ratio 8 (L) 12 - 20      GFR est AA 46 (L) >60 ml/min/1.73m2    GFR est non-AA 38 (L) >60 ml/min/1.73m2    Calcium 8.5 8.5 - 10.1 MG/DL    Bilirubin, total 0.5 0.2 - 1.0 MG/DL    ALT (SGPT) 50 12 - 78 U/L    AST (SGOT) 54 (H) 15 - 37 U/L    Alk. phosphatase 144 (H) 45 - 117 U/L    Protein, total 7.3 6.4 - 8.2 g/dL    Albumin 3.6 3.5 - 5.0 g/dL    Globulin 3.7 2.0 - 4.0 g/dL    A-G Ratio 1.0 (L) 1.1 - 2.2     TROPONIN I    Collection Time: 05/12/18  4:39 PM   Result Value Ref Range    Troponin-I, Qt. <0.04 <0.05 ng/mL   CK W/ CKMB & INDEX    Collection Time: 05/12/18  4:39 PM   Result Value Ref Range     (H) 39 - 308 U/L    CK - MB 5.4 (H) <3.6 NG/ML    CK-MB Index 1.3 0 - 2.5         Radiologic Studies -   CT Results  (Last 48 hours)               05/12/18 1712  CT HEAD WO CONT Final result    Impression:  Impression:    1. No evidence of acute infarct or intracranial hemorrhage. 2. Left cerebellar infarct, new since prior study; favor subacute or chronic   timeframe. 3. Periventricular white matter disease is likely secondary to chronic small   vessel ischemic changes. Narrative: Indication:  Headache        Comparison: CT 8/11/2015       Findings: 5 mm axial images were obtained from the skull base through the   vertex. CT dose reduction was achieved through the use of a standardized protocol   tailored for this examination and automatic exposure control for dose   modulation. The ventricles and cortical sulci are prominent, compatible with age related   volume loss.  There is no evidence of intracranial hemorrhage, mass, or mass   effect. There is a left cerebellar hemisphere infarct, either subacute or   chronic, but new since prior study. There is periventricular white matter   disease. No extra-axial fluid collections are seen. There is opacification of   portions of the right sphenoid sinus. The orbital structures are unremarkable. No osseous abnormalities are seen. CXR Results  (Last 48 hours)               05/12/18 1613  XR CHEST PORT Final result    Impression:  IMPRESSION: No acute findings. Narrative:  EXAM:  XR CHEST PORT       INDICATION:  Chest Pain       COMPARISON:  Chest x-ray 10/7/2017. FINDINGS: A portable AP radiograph of the chest was obtained at 16:11 hours. The   patient is on a cardiac monitor. The lungs are clear. The cardiac and   mediastinal contours and pulmonary vascularity are normal. There is tortuosity   of the thoracic aorta. The bones and soft tissues are grossly stable with   metallic shot densities overlying the lateral left chest wall and degenerative   spine changes but otherwise within normal limits. Medical Decision Making   I am the first provider for this patient. I reviewed the vital signs, available nursing notes, past medical history, past surgical history, family history and social history. Vital Signs-Reviewed the patient's vital signs. Patient Vitals for the past 12 hrs:   Temp Pulse Resp BP SpO2   05/12/18 1815 - - - 148/90 100 %   05/12/18 1814 - (!) 55 17 - 100 %   05/12/18 1645 - (!) 58 19 132/83 99 %   05/12/18 1531 97.8 °F (36.6 °C) 69 18 122/81 100 %     EKG interpretation: (Preliminary): 1536  Rhythm: normal sinus rhythm; and regular .  Rate (approx.): 64; Axis: right superior axis deviation; DE interval: normal; QRS interval: normal ; ST/T wave: normal.  Written by Jannelle Blizzard, ED Scribe, as dictated by Laura Daniels MD.    Records Reviewed: Nursing Notes, Old Medical Records, Previous electrocardiograms, Previous Radiology Studies and Previous Laboratory Studies    Provider Notes (Medical Decision Making):   Pt presents with a HA in the L temporal area and pain in his L ear. Headache was not acute in onset nor is it the worse HA of his life. Do not suspect any subarrachnoid hemorrhage. There is some suspicion for temporal arteritis, however the pt is already blind in his L eye. Likely tension type HA. Given his age will check head CT for other acute hemorrhage in the brain or mass/lesion. He did mention CP in triage although he denied it to me. I will obtain cardiac enzymes in the event that he is forgetful of his CP. However, since he is not reporting it to me, I have a low suspicion of ACS. ED Course:   Initial assessment performed. The patients presenting problems have been discussed, and they are in agreement with the care plan formulated and outlined with them. I have encouraged them to ask questions as they arise throughout their visit. EKG interpretation: (Preliminary) 15:36  NSR. Rate 64. QRS 66. QT/QTc 396/408. No acute ischemic changes. PROGRESS NOTE:  7:12 PM  Pt has been re-evaluated. Pt was updated on his results and all questions were answered. Critical Care Time:   0    Disposition:  DISCHARGE NOTE  7:12 PM  The patient has been re-evaluated and is ready for discharge. Reviewed available results with patient. Counseled patient on diagnosis and care plan. Patient has expressed understanding, and all questions have been answered. Patient agrees with plan and agrees to follow up as recommended, or return to the ED if their symptoms worsen. Discharge instructions have been provided and explained to the patient, along with reasons to return to the ED. PLAN:  1. Discharge  Current Discharge Medication List      CONTINUE these medications which have CHANGED    Details   naproxen (NAPROSYN) 250 mg tablet Take 1 Tab by mouth every twelve (12) hours as needed.   Qty: 6 Tab, Refills: 0           2. Follow-up Information     Follow up With Details Comments Contact Info    Glenda Bridges MD Schedule an appointment as soon as possible for a visit in 2 days  [address]  [phone]          Return to ED if worse     Diagnosis     Clinical Impression:   1. Acute nonintractable headache, unspecified headache type        Attestations: This note is prepared by Merline Som, acting as Scribe for Margarita Lucero MD.    Margarita Lucero MD: The scribe's documentation has been prepared under my direction and personally reviewed by me in its entirety. I confirm that the note above accurately reflects all work, treatment, procedures, and medical decision making performed by me. This note will not be viewable in [address]

## 2018-05-12 NOTE — ED NOTES
Discharge instructions reviewed w/ pt and copy given by Dr. Talbot Litten. Pt discharged from  ED to care of wife.

## 2018-05-13 LAB
ATRIAL RATE: 64 BPM
CALCULATED R AXIS, ECG10: -166 DEGREES
CALCULATED T AXIS, ECG11: 174 DEGREES
DIAGNOSIS, 93000: NORMAL
P-R INTERVAL, ECG05: 190 MS
Q-T INTERVAL, ECG07: 396 MS
QRS DURATION, ECG06: 66 MS
QTC CALCULATION (BEZET), ECG08: 408 MS
VENTRICULAR RATE, ECG03: 64 BPM

## 2018-06-09 ENCOUNTER — APPOINTMENT (OUTPATIENT)
Dept: CT IMAGING | Age: 83
End: 2018-06-09
Attending: EMERGENCY MEDICINE
Payer: MEDICARE

## 2018-06-09 ENCOUNTER — HOSPITAL ENCOUNTER (EMERGENCY)
Age: 83
Discharge: HOME | End: 2018-06-09
Attending: EMERGENCY MEDICINE | Admitting: EMERGENCY MEDICINE
Payer: MEDICARE

## 2018-06-09 VITALS
SYSTOLIC BLOOD PRESSURE: 148 MMHG | HEIGHT: 65 IN | OXYGEN SATURATION: 100 % | HEART RATE: 53 BPM | TEMPERATURE: 97.7 F | BODY MASS INDEX: 25.33 KG/M2 | RESPIRATION RATE: 18 BRPM | DIASTOLIC BLOOD PRESSURE: 79 MMHG | WEIGHT: 152 LBS

## 2018-06-09 DIAGNOSIS — R51.9 NONINTRACTABLE HEADACHE, UNSPECIFIED CHRONICITY PATTERN, UNSPECIFIED HEADACHE TYPE: Primary | ICD-10-CM

## 2018-06-09 PROCEDURE — 70450 CT HEAD/BRAIN W/O DYE: CPT

## 2018-06-09 PROCEDURE — 99283 EMERGENCY DEPT VISIT LOW MDM: CPT

## 2018-06-09 PROCEDURE — 74011250637 HC RX REV CODE- 250/637: Performed by: EMERGENCY MEDICINE

## 2018-06-09 RX ORDER — TRAZODONE HYDROCHLORIDE 50 MG/1
50 TABLET ORAL
COMMUNITY
End: 2018-10-30

## 2018-06-09 RX ORDER — BUTALBITAL, ACETAMINOPHEN AND CAFFEINE 50; 325; 40 MG/1; MG/1; MG/1
2 TABLET ORAL
Status: COMPLETED | OUTPATIENT
Start: 2018-06-09 | End: 2018-06-09

## 2018-06-09 RX ORDER — ATORVASTATIN CALCIUM 10 MG/1
10 TABLET, FILM COATED ORAL
COMMUNITY
End: 2018-10-30

## 2018-06-09 RX ORDER — OXYCODONE AND ACETAMINOPHEN 5; 325 MG/1; MG/1
1 TABLET ORAL
Status: ON HOLD | COMMUNITY
End: 2018-09-05

## 2018-06-09 RX ORDER — BACLOFEN 20 MG
500 TABLET ORAL DAILY
COMMUNITY
End: 2018-10-30

## 2018-06-09 RX ORDER — BUTALBITAL, ACETAMINOPHEN AND CAFFEINE 50; 325; 40 MG/1; MG/1; MG/1
1 TABLET ORAL
Qty: 6 TAB | Refills: 0 | Status: ON HOLD | OUTPATIENT
Start: 2018-06-09 | End: 2018-09-05

## 2018-06-09 RX ORDER — CLOPIDOGREL BISULFATE 75 MG/1
75 TABLET ORAL DAILY
COMMUNITY
End: 2018-10-30

## 2018-06-09 RX ADMIN — BUTALBITAL, ACETAMINOPHEN AND CAFFEINE 2 TABLET: 50; 325; 40 TABLET ORAL at 16:10

## 2018-06-09 NOTE — ED NOTES
Pt presents to the ED with c/o headache on the side of his head x3 days. Pt reprots taking Excedrin migraine yesterday. Pt denies dizziness. Pt denies vision changes. Pt reports feeling weakness. Pt denies n/v/d. Pt has a hx of migraines. Pt and family are unsure of medications and are poor historians. Pt reports he was recently at [facility] and had a CT of his head performed. Pt is alert and oriented. Pt skin is warm and dry. Pt is ambulatory with a cane. Emergency Department Nursing Plan of Care       The Nursing Plan of Care is developed from the Nursing assessment and Emergency Department Attending provider initial evaluation. The plan of care may be reviewed in the ED Provider note.     The Plan of Care was developed with the following considerations:   Patient / Family readiness to learn indicated by:verbalized understanding  Persons(s) to be included in education: patient  Barriers to Learning/Limitations:No    Signed     Johnathan Cheatham    6/9/2018   3:24 PM

## 2018-06-09 NOTE — DISCHARGE INSTRUCTIONS

## 2018-06-09 NOTE — ED NOTES
Patient  given copy of dc instructions and 1 paper script(s) and 0 electronic scripts. Patient  verbalized understanding of instructions and script (s). Patient given a current medication reconciliation form and verbalized understanding of their medications. Patient  verbalized understanding of the importance of discussing medications with  his or her physician or clinic they will be following up with. Patient alert and oriented and in no acute distress. Patient offered wheelchair from treatment area to hospital entrance, patient declined wheelchair.

## 2018-06-09 NOTE — ED PROVIDER NOTES
EMERGENCY DEPARTMENT HISTORY AND PHYSICAL EXAM      Date: 6/9/2018  Patient Name: Leonela Arnold    History of Presenting Illness     Chief Complaint   Patient presents with    Headache       History Provided By: Patient and Patient's Stepdaughter and Patient's Friend    HPI: Leonela Arnold, 80 y.o. male with PMHx significant for DM, HTN, and GI disorder, presents ambulatory to the ED with cc of persistent, moderate HA that radiates from the top of the pt's head down the side of his face and into his shoulder that started a couple of days ago. Pt's stepdaughter states that the pt visited the ED [facility] ED on 5/12/18 for a similar HA and was seen by radiology for an CXR and a head CT. Pt's friend reports that the head CT found signs of a past stroke. Pt states that he has taken Excedrin and ASA , both of which have not helped his AMAYA. Pt's friend states that the pt's rx of ASA was changed to Plavix. Pt denies a hx of heart attacks. Pt does not endorse smoking tobacco and does not drink alcohol. Chief Complaint: HA  Duration: couple of days ago    Timing:  Constant  Location: head, side of face, shoulder  Quality: N/A  Severity: Moderate  Modifying Factors: ASA and Excedrin did not help with relief. Associated Symptoms: denies any other associated signs or symptoms    There are no other complaints, changes, or physical findings at this time. PCP: Maribel Soto, DO    Current Outpatient Prescriptions   Medication Sig Dispense Refill    atorvastatin (LIPITOR) 10 mg tablet Take 10 mg by mouth daily.  oxyCODONE-acetaminophen (PERCOCET) 5-325 mg per tablet Take 1 Tab by mouth every four (4) hours as needed for Pain.  traZODone (DESYREL) 50 mg tablet Take 50 mg by mouth nightly.  magnesium oxide 500 mg tab Take  by mouth.  clopidogrel (PLAVIX) 75 mg tab Take 75 mg by mouth.       butalbital-acetaminophen-caffeine (FIORICET, ESGIC) -40 mg per tablet Take 1 Tab by mouth every six (6) hours as needed for Headache. Indications: Migraine 6 Tab 0    insulin NPH (NOVOLIN N, HUMULIN N) 100 unit/mL injection 12 units in AM & 8 units in PM 1 Vial 0    aspirin delayed-release 325 mg tablet Take 1 Tab by mouth daily. 30 Tab 0    sertraline (ZOLOFT) 100 mg tablet Take 200 mg by mouth daily.  naproxen (NAPROSYN) 250 mg tablet Take 1 Tab by mouth every twelve (12) hours as needed. 6 Tab 0    lidocaine (LIDODERM) 5 % Apply patch to the affected area for 12 hours a day and remove for 12 hours a day. 15 Each 0    metoprolol (LOPRESSOR) 50 mg tablet Take 1 Tab by mouth two (2) times a day. 60 Tab 0    busPIRone (BUSPAR) 30 mg tablet Take 30 mg by mouth two (2) times a day.  clonazePAM (KLONOPIN) 1 mg tablet Take 1 mg by mouth nightly as needed (sleep, anxiety).  gabapentin (NEURONTIN) 300 mg capsule Take 300 mg by mouth three (3) times daily.  cyanocobalamin (VITAMIN B-12) 1,000 mcg tablet Take 1,000 mcg by mouth daily.  docusate sodium (COLACE) 100 mg capsule Take 100 mg by mouth two (2) times a day. Past History     Past Medical History:  Past Medical History:   Diagnosis Date    Diabetes ([address]     Gastrointestinal disorder     Hypertension     Other ill-defined conditions(799.89)     gout, glaucoma    Other ill-defined conditions(799.89)     high cholesterol       Past Surgical History:  Past Surgical History:   Procedure Laterality Date    HX ORTHOPAEDIC      cervical surgery       Family History:  History reviewed. No pertinent family history. Social History:  Social History   Substance Use Topics    Smoking status: Never Smoker    Smokeless tobacco: Never Used    Alcohol use No      Comment: quit       Allergies:  No Known Allergies      Review of Systems   Review of Systems   Constitutional: Negative for chills and fever. HENT: Negative for congestion, rhinorrhea, sneezing and sore throat. Eyes: Negative for redness and visual disturbance. Respiratory: Negative for shortness of breath. Cardiovascular: Negative for chest pain and leg swelling. Gastrointestinal: Negative for abdominal pain, nausea and vomiting. Genitourinary: Negative for difficulty urinating and frequency. Musculoskeletal: Negative for back pain, myalgias and neck stiffness. Skin: Negative for rash. Neurological: Positive for headaches. Negative for dizziness, syncope and weakness. Hematological: Negative for adenopathy. All other systems reviewed and are negative. Physical Exam   Physical Exam   Constitutional: He is oriented to person, place, and time. He appears well-developed and well-nourished. HENT:   Head: Normocephalic and atraumatic. Mouth/Throat: Oropharynx is clear and moist and mucous membranes are normal.   Eyes: EOM are normal.   Neck: Normal range of motion and full passive range of motion without pain. Neck supple. Cardiovascular: Normal rate, regular rhythm, normal heart sounds, intact distal pulses and normal pulses. No murmur heard. Pulmonary/Chest: Effort normal and breath sounds normal. No respiratory distress. He exhibits no tenderness. Abdominal: Soft. Normal appearance and bowel sounds are normal. There is no tenderness. There is no rebound and no guarding. Neurological: He is alert and oriented to person, place, and time. He has normal strength. Skin: Skin is warm, dry and intact. No rash noted. No erythema. Psychiatric: He has a normal mood and affect. His speech is normal and behavior is normal. Judgment and thought content normal.   Nursing note and vitals reviewed. Diagnostic Study Results     Labs -   No results found for this or any previous visit (from the past 12 hour(s)).     Radiologic Studies -   CT HEAD WO CONT   Final Result        CT Results  (Last 48 hours)               06/09/18 1630  CT HEAD WO CONT Final result    Impression:  IMPRESSION:        No acute process is identified Narrative:  EXAM:  CT HEAD WO CONT   Clinical history: Left-sided headache radiating to jaw   INDICATION:   left sided HA, radiates to left jaw, history of CVA       COMPARISON: 5/12/2018. CONTRAST:  None. TECHNIQUE: Unenhanced CT of the head was performed using 5 mm images. Brain and   bone windows were generated. CT dose reduction was achieved through use of a   standardized protocol tailored for this examination and automatic exposure   control for dose modulation. FINDINGS:   There is sulcal and ventricular prominence. Sphenoid sinus disease. [place] Confluent   periventricular and scattered hypodensities in the cerebral white matter. .   Chronic left occipital infarction. There is no intracranial hemorrhage,   extra-axial collection, mass, mass effect or midline shift. The basilar   cisterns are open. No acute infarct is identified. The bone windows demonstrate   no abnormalities. The visualized portions of the paranasal sinuses and mastoid   air cells are clear. CXR Results  (Last 48 hours)    None            Medical Decision Making   I am the first provider for this patient. I reviewed the vital signs, available nursing notes, past medical history, past surgical history, family history and social history. Vital Signs-Reviewed the patient's vital signs. Patient Vitals for the past 12 hrs:   Temp Pulse BP SpO2   06/09/18 1519 95 °F (35 °C) 65 142/88 100 %       Pulse Oximetry Analysis - 100% on RA    Records Reviewed: Nursing Notes and Old Medical Records    Provider Notes (Medical Decision Making):   DDx: tension HA, migraine HA, low concern for intracranial hemorrhage based on previous workup, recent PCP visit and continued sxs. ED Course:   Initial assessment performed. The patients presenting problems have been discussed, and they are in agreement with the care plan formulated and outlined with them.   I have encouraged them to ask questions as they arise throughout their visit. PROGRESS NOTE:  5:18 PM  Discussed plan of care with daughter-in-law who is okay with plan. Written by Jose Ojeda ED Scribe, as dictated by Jose Ojeda. Critical Care Time: 0 minutes    Disposition:  DISCHARGE NOTE  5:22 PM  The patient has been re-evaluated and is ready for discharge. Reviewed available results with patient. Counseled pt on diagnosis and care plan. Pt has expressed understanding, and all questions have been answered. Pt agrees with plan and agrees to F/U as recommended, or return to the ED if their sxs worsen. Discharge instructions have been provided and explained to the pt, along with reasons to return to the ED. Written by Jose Ojeda ED Scribe, as dictated by Migel Montaño MD.    PLAN:  1. Current Discharge Medication List      START taking these medications    Details   butalbital-acetaminophen-caffeine (FIORICET, ESGIC) -40 mg per tablet Take 1 Tab by mouth every six (6) hours as needed for Headache. Indications: Migraine  Qty: 6 Tab, Refills: 0    Associated Diagnoses: Nonintractable headache, unspecified chronicity pattern, unspecified headache type           2. Follow-up Information     Follow up With Details Comments [facility], DO Schedule an appointment as soon as possible for a visit  [address] - [place] [facility] DEPT  As needed, If symptoms worsen [address] [facility]  [phone]        Return to ED if worse     Diagnosis     Clinical Impression:   1. Nonintractable headache, unspecified chronicity pattern, unspecified headache type        Attestation: This note is prepared by Jose Ojeda, acting as Scribe for MD Migel Zhu MD: The scribe's documentation has been prepared under my direction and personally reviewed by me in its entirety.  I confirm that the note above accurately reflects all work, treatment, procedures, and medical decision making performed by me.

## 2018-08-10 ENCOUNTER — HOSPITAL ENCOUNTER (OUTPATIENT)
Dept: GENERAL RADIOLOGY | Age: 83
Discharge: HOME | End: 2018-08-10
Payer: MEDICARE

## 2018-08-10 DIAGNOSIS — M19.90 DJD (DEGENERATIVE JOINT DISEASE): ICD-10-CM

## 2018-08-10 PROCEDURE — 72050 X-RAY EXAM NECK SPINE 4/5VWS: CPT

## 2018-08-10 PROCEDURE — 70330 X-RAY EXAM OF JAW JOINTS: CPT

## 2018-08-29 ENCOUNTER — HOSPITAL ENCOUNTER (EMERGENCY)
Age: 83
Discharge: HOME | End: 2018-08-29
Attending: EMERGENCY MEDICINE
Payer: MEDICARE

## 2018-08-29 ENCOUNTER — APPOINTMENT (OUTPATIENT)
Dept: CT IMAGING | Age: 83
End: 2018-08-29
Attending: EMERGENCY MEDICINE
Payer: MEDICARE

## 2018-08-29 VITALS
TEMPERATURE: 98.6 F | SYSTOLIC BLOOD PRESSURE: 100 MMHG | WEIGHT: 132 LBS | RESPIRATION RATE: 14 BRPM | BODY MASS INDEX: 20.72 KG/M2 | HEART RATE: 60 BPM | DIASTOLIC BLOOD PRESSURE: 70 MMHG | HEIGHT: 67 IN | OXYGEN SATURATION: 100 %

## 2018-08-29 DIAGNOSIS — R51.9 NONINTRACTABLE HEADACHE, UNSPECIFIED CHRONICITY PATTERN, UNSPECIFIED HEADACHE TYPE: Primary | ICD-10-CM

## 2018-08-29 DIAGNOSIS — S32.010A CLOSED COMPRESSION FRACTURE OF FIRST LUMBAR VERTEBRA, INITIAL ENCOUNTER: ICD-10-CM

## 2018-08-29 DIAGNOSIS — R11.10 NON-INTRACTABLE VOMITING, PRESENCE OF NAUSEA NOT SPECIFIED, UNSPECIFIED VOMITING TYPE: ICD-10-CM

## 2018-08-29 DIAGNOSIS — N18.30 CHRONIC RENAL INSUFFICIENCY, STAGE III (MODERATE) (HCC): ICD-10-CM

## 2018-08-29 LAB
ALBUMIN SERPL-MCNC: 3.4 G/DL (ref 3.5–5)
ALBUMIN/GLOB SERPL: 0.9 {RATIO} (ref 1.1–2.2)
ALP SERPL-CCNC: 102 U/L (ref 45–117)
ALT SERPL-CCNC: 21 U/L (ref 12–78)
ANION GAP SERPL CALC-SCNC: 8 MMOL/L (ref 5–15)
AST SERPL-CCNC: 26 U/L (ref 15–37)
BASOPHILS # BLD: 0 K/UL (ref 0–0.1)
BASOPHILS NFR BLD: 0 % (ref 0–1)
BILIRUB SERPL-MCNC: 0.6 MG/DL (ref 0.2–1)
BUN SERPL-MCNC: 14 MG/DL (ref 6–20)
BUN/CREAT SERPL: 8 (ref 12–20)
CALCIUM SERPL-MCNC: 8.7 MG/DL (ref 8.5–10.1)
CHLORIDE SERPL-SCNC: 105 MMOL/L (ref 97–108)
CO2 SERPL-SCNC: 25 MMOL/L (ref 21–32)
CREAT SERPL-MCNC: 1.77 MG/DL (ref 0.7–1.3)
DIFFERENTIAL METHOD BLD: ABNORMAL
EOSINOPHIL # BLD: 0.1 K/UL (ref 0–0.4)
EOSINOPHIL NFR BLD: 2 % (ref 0–7)
ERYTHROCYTE [DISTWIDTH] IN BLOOD BY AUTOMATED COUNT: 11.9 % (ref 11.5–14.5)
GLOBULIN SER CALC-MCNC: 3.6 G/DL (ref 2–4)
GLUCOSE SERPL-MCNC: 140 MG/DL (ref 65–100)
HCT VFR BLD AUTO: 32.9 % (ref 36.6–50.3)
HGB BLD-MCNC: 11.2 G/DL (ref 12.1–17)
IMM GRANULOCYTES # BLD: 0 K/UL (ref 0–0.04)
IMM GRANULOCYTES NFR BLD AUTO: 0 % (ref 0–0.5)
LIPASE SERPL-CCNC: 104 U/L (ref 73–393)
LYMPHOCYTES # BLD: 2.3 K/UL (ref 0.8–3.5)
LYMPHOCYTES NFR BLD: 43 % (ref 12–49)
MCH RBC QN AUTO: 30.6 PG (ref 26–34)
MCHC RBC AUTO-ENTMCNC: 34 G/DL (ref 30–36.5)
MCV RBC AUTO: 89.9 FL (ref 80–99)
MONOCYTES # BLD: 0.5 K/UL (ref 0–1)
MONOCYTES NFR BLD: 9 % (ref 5–13)
NEUTS SEG # BLD: 2.5 K/UL (ref 1.8–8)
NEUTS SEG NFR BLD: 47 % (ref 32–75)
NRBC # BLD: 0 K/UL (ref 0–0.01)
NRBC BLD-RTO: 0 PER 100 WBC
PLATELET # BLD AUTO: 180 K/UL (ref 150–400)
PMV BLD AUTO: 9.2 FL (ref 8.9–12.9)
POTASSIUM SERPL-SCNC: 4.3 MMOL/L (ref 3.5–5.1)
PROT SERPL-MCNC: 7 G/DL (ref 6.4–8.2)
RBC # BLD AUTO: 3.66 M/UL (ref 4.1–5.7)
SODIUM SERPL-SCNC: 138 MMOL/L (ref 136–145)
WBC # BLD AUTO: 5.3 K/UL (ref 4.1–11.1)

## 2018-08-29 PROCEDURE — 74176 CT ABD & PELVIS W/O CONTRAST: CPT

## 2018-08-29 PROCEDURE — 83690 ASSAY OF LIPASE: CPT | Performed by: EMERGENCY MEDICINE

## 2018-08-29 PROCEDURE — 74011000250 HC RX REV CODE- 250: Performed by: EMERGENCY MEDICINE

## 2018-08-29 PROCEDURE — 36415 COLL VENOUS BLD VENIPUNCTURE: CPT | Performed by: EMERGENCY MEDICINE

## 2018-08-29 PROCEDURE — 74011250637 HC RX REV CODE- 250/637: Performed by: EMERGENCY MEDICINE

## 2018-08-29 PROCEDURE — 99285 EMERGENCY DEPT VISIT HI MDM: CPT

## 2018-08-29 PROCEDURE — 85025 COMPLETE CBC W/AUTO DIFF WBC: CPT | Performed by: EMERGENCY MEDICINE

## 2018-08-29 PROCEDURE — 80053 COMPREHEN METABOLIC PANEL: CPT | Performed by: EMERGENCY MEDICINE

## 2018-08-29 RX ORDER — ACETAMINOPHEN 325 MG/1
650 TABLET ORAL
Qty: 30 TAB | Refills: 0 | Status: ON HOLD | OUTPATIENT
Start: 2018-08-29 | End: 2018-09-05

## 2018-08-29 RX ORDER — FLUOXETINE HYDROCHLORIDE 20 MG/1
20 CAPSULE ORAL DAILY
COMMUNITY
End: 2019-08-17

## 2018-08-29 RX ORDER — ACETAMINOPHEN 325 MG/1
650 TABLET ORAL
Status: COMPLETED | OUTPATIENT
Start: 2018-08-29 | End: 2018-08-29

## 2018-08-29 RX ADMIN — PHENOBARBITAL ELIXIR 50 ML: 16.2; .1037; .0065; .0194 ELIXIR ORAL at 13:41

## 2018-08-29 RX ADMIN — ACETAMINOPHEN 650 MG: 325 TABLET ORAL at 15:47

## 2018-08-29 NOTE — DISCHARGE INSTRUCTIONS
Compression Fracture of the Spine: Care Instructions  Your Care Instructions    A compression fracture happens when the front part of a spinal bone breaks and collapses. A fall or other accident can cause it. A minor injury or moving the wrong way can cause a break if you have thin or brittle bones (osteoporosis). These types of breaks will heal in 8 to 10 weeks. You will need rest and pain medicines. Your doctor may recommend physical therapy. Some doctors recommend that certain people with compression fractures wear braces. Your doctor also may treat thin or brittle bones. You may need surgery if you have lasting pain or if the bone presses on the spinal cord or nerves. You heal best when you take good care of yourself. Eat a variety of healthy foods, and don't smoke. Follow-up care is a key part of your treatment and safety. Be sure to make and go to all appointments, and call your doctor if you are having problems. It's also a good idea to know your test results and keep a list of the medicines you take. How can you care for yourself at home? · Be safe with medicines. Read and follow all instructions on the label. ¨ If the doctor gave you a prescription medicine for pain, take it as prescribed. ¨ If you are not taking a prescription pain medicine, ask your doctor if you can take an over-the-counter medicine. · Talk to your doctor about how to make your bones stronger. You may need medicines or a change in what you eat. · Be active only as directed by your doctor. When should you call for help? Call 911 anytime you think you may need emergency care. For example, call if:    · You are unable to move an arm or a leg at all.   [facility] your doctor now or seek immediate medical care if:    · You have new or worse symptoms in your arms, legs, belly, or buttocks. Symptoms may include:  ¨ Numbness or tingling. ¨ Weakness.   ¨ Pain.     · You lose bladder or bowel control.     · You have belly pain, bloating, vomiting, or nausea.    Watch closely for changes in your health, and be sure to contact your doctor if:    · You do not get better as expected. Where can you learn more? Go to http://maranda-willy.info/. Enter P445 in the search box to learn more about \"Compression Fracture of the Spine: Care Instructions. \"  Current as of: November 29, 2017  Content Version: 11.7  © 2743-7554 [employer]. Care instructions adapted under license by [employer] (which disclaims liability or warranty for this information). If you have questions about a medical condition or this instruction, always ask your healthcare professional. [place] [address] any warranty or liability for your use of this information. Head or Face Pain: Care Instructions  Your Care Instructions    Common causes of head or face pain are allergies, stress, and injuries. Other causes include tooth problems and sinus infections. Eating certain foods, such as chocolate or cheese, or drinking certain liquids, such as coffee or cola, can cause head pain for some people. If you have mild head pain, you may not need treatment. It is important to watch your symptoms and talk to your doctor if your pain continues or gets worse. Follow-up care is a key part of your treatment and safety. Be sure to make and go to all appointments, and call your doctor if you are having problems. It's also a good idea to know your test results and keep a list of the medicines you take. How can you care for yourself at home? · Take pain medicines exactly as directed. ¨ If the doctor gave you a prescription medicine for pain, take it as prescribed. ¨ If you are not taking a prescription pain medicine, ask your doctor if you can take an over-the-counter pain medicine. · Take it easy for the next few days or longer if you are not feeling well.   · Use a warm, moist towel or heating pad set on low to relax tight muscles in your shoulder and neck. Have someone gently massage your neck and shoulders. · Put ice or a cold pack on the area for 10 to 20 minutes at a time. Put a thin cloth between the ice and your skin. When should you call for help? Call 911 anytime you think you may need emergency care. For example, call if:    · You have twitching, jerking, or a seizure.     · You passed out (lost consciousness).     · You have symptoms of a stroke. These may include:  ¨ Sudden numbness, tingling, weakness, or loss of movement in your face, arm, or leg, especially on only one side of your body. ¨ Sudden vision changes. ¨ Sudden trouble speaking. ¨ Sudden confusion or trouble understanding simple statements. ¨ Sudden problems with walking or balance. ¨ A sudden, severe headache that is different from past headaches.     · You have jaw pain and pain in your chest, shoulder, neck, or arm.    Call your doctor now or seek immediate medical care if:    · You have a fever with a stiff neck or a severe headache.     · You have nausea and vomiting, or you cannot keep food or liquids down.    Watch closely for changes in your health, and be sure to contact your doctor if:    · Your head or face pain does not get better as expected. Where can you learn more? Go to http://maranda-willy.info/. Enter P568 in the search box to learn more about \"Head or Face Pain: Care Instructions. \"  Current as of: November 20, 2017  Content Version: 11.7  © 6836-9065 [employer]. Care instructions adapted under license by [employer] (which disclaims liability or warranty for this information). If you have questions about a medical condition or this instruction, always ask your healthcare professional. [place] [address] any warranty or liability for your use of this information.          Nausea and Vomiting: Care Instructions  Your Care Instructions    When you are nauseated, you may feel weak and sweaty and notice a lot of saliva in your mouth. Nausea often leads to vomiting. Most of the time you do not need to worry about nausea and vomiting, but they can be signs of other illnesses. Two common causes of nausea and vomiting are stomach flu and food poisoning. Nausea and vomiting from viral stomach flu will usually start to improve within 24 hours. Nausea and vomiting from food poisoning may last from 12 to 48 hours. The doctor has checked you carefully, but problems can develop later. If you notice any problems or new symptoms, get medical treatment right away. Follow-up care is a key part of your treatment and safety. Be sure to make and go to all appointments, and call your doctor if you are having problems. It's also a good idea to know your test results and keep a list of the medicines you take. How can you care for yourself at home? · To prevent dehydration, drink plenty of fluids, enough so that your urine is light yellow or clear like water. Choose water and other caffeine-free clear liquids until you feel better. If you have kidney, heart, or liver disease and have to limit fluids, talk with your doctor before you increase the amount of fluids you drink. · Rest in bed until you feel better. · When you are able to eat, try clear soups, mild foods, and liquids until all symptoms are gone for 12 to 48 hours. Other good choices include dry toast, crackers, cooked cereal, and gelatin dessert, such as Jell-O. When should you call for help? Call 911 anytime you think you may need emergency care. For example, call if:    · You passed out (lost consciousness).    Call your doctor now or seek immediate medical care if:    · You have symptoms of dehydration, such as:  ¨ Dry eyes and a dry mouth. ¨ Passing only a little dark urine.   ¨ Feeling thirstier than usual.     · You have new or worsening belly pain.     · You have a new or higher fever.     · You vomit blood or what looks like coffee grounds.    Watch closely for changes in your health, and be sure to contact your doctor if:    · You have ongoing nausea and vomiting.     · Your vomiting is getting worse.     · Your vomiting lasts longer than 2 days.     · You are not getting better as expected. Where can you learn more? Go to http://maranda-willy.info/. Enter 25 737603 in the search box to learn more about \"Nausea and Vomiting: Care Instructions. \"  Current as of: November 20, 2017  Content Version: 11.7  © 3747-9699 [employer]. Care instructions adapted under license by [employer] (which disclaims liability or warranty for this information). If you have questions about a medical condition or this instruction, always ask your healthcare professional. [address] any warranty or liability for your use of this information.

## 2018-08-29 NOTE — ED NOTES
....Discharge summary and discharge medications reviewed with patient and spouse and appropriate educational materials and side effects teaching were provided. patient  Given 0 paper prescriptions and 1 electronic prescriptions sent to pt's listed pharmacy. Patient (s) verbalized understanding of the importance of discussing medications with his or her physician or clinic they will be following up with. No si/s of acute distress prior to discharge. Patient offered wheelchair from treatment area to hospital entrance, patient refused wheelchair. Reported that his abd and headache were feeling better, tolerable for discharge. Steady gait with pt's family member.

## 2018-08-29 NOTE — ED PROVIDER NOTES
EMERGENCY DEPARTMENT HISTORY AND PHYSICAL EXAM 
 
 
Date: 8/29/2018 Patient Name: Dayo Berry History of Presenting Illness Chief Complaint Patient presents with  Abdominal Pain History Provided By: Patient HPI: Dayo Berry, 80 y.o. male with PMHx significant for gastrointestinal disorder, gout, glaucoma, diabetes, and hyperlipidemia, presents ambulatory to the ED with cc of persistent, constant, worsening, moderate HA that radiates down through the pt's neck since a month ago alongside associated productive cough with phlegm, trouble swallowing, nausea, and dry heaving. He states that when he eats food he begins dry heaving and coughing which exacerbates his radiating HA pain. He states that he gets mild GERD. He notes that he did not tell his PCP about his sxs because they were not as bad then. He states that the next appointment he has with Dr. Elizabeth Burroughs is on 9/9/18. The pt notes that he still has his gallbladder. He states that he is urinating okay. He reports using laxatives, but is otherwise experiencing nl BMs daily. He specifically denies fever. PMHx:  gastrointestinal disorder, gout, glaucoma, diabetes, and hyperlipidemia PSHx: cervical surgery SHx: - EtOH - tobacco - illicit drugs There are no other complaints, changes, or physical findings at this time. PCP: Bahman Uribe MD 
 
Current Outpatient Prescriptions Medication Sig Dispense Refill  FLUoxetine (PROZAC) 20 mg capsule Take  by mouth daily.  acetaminophen (TYLENOL) 325 mg tablet Take 2 Tabs by mouth every four (4) hours as needed for Pain. 30 Tab 0  
 atorvastatin (LIPITOR) 10 mg tablet Take 10 mg by mouth daily.  oxyCODONE-acetaminophen (PERCOCET) 5-325 mg per tablet Take 1 Tab by mouth every four (4) hours as needed for Pain.  magnesium oxide 500 mg tab Take  by mouth.  clopidogrel (PLAVIX) 75 mg tab Take 75 mg by mouth.  insulin NPH (NOVOLIN N, HUMULIN N) 100 unit/mL injection 12 units in AM & 8 units in PM (Patient taking differently: 12 units in AM & 8 units in PM  Indications: 15 in AM, 10 in PM) 1 Vial 0  
 metoprolol (LOPRESSOR) 50 mg tablet Take 1 Tab by mouth two (2) times a day. 60 Tab 0  
 aspirin delayed-release 325 mg tablet Take 1 Tab by mouth daily. 30 Tab 0  
 cyanocobalamin (VITAMIN B-12) 1,000 mcg tablet Take 1,000 mcg by mouth daily.  traZODone (DESYREL) 50 mg tablet Take 50 mg by mouth nightly.  butalbital-acetaminophen-caffeine (FIORICET, ESGIC) -40 mg per tablet Take 1 Tab by mouth every six (6) hours as needed for Headache. Indications: Migraine 6 Tab 0  
 naproxen (NAPROSYN) 250 mg tablet Take 1 Tab by mouth every twelve (12) hours as needed. 6 Tab 0  
 lidocaine (LIDODERM) 5 % Apply patch to the affected area for 12 hours a day and remove for 12 hours a day. 15 Each 0  
 busPIRone (BUSPAR) 30 mg tablet Take 30 mg by mouth two (2) times a day.  clonazePAM (KLONOPIN) 1 mg tablet Take 1 mg by mouth nightly as needed (sleep, anxiety).  gabapentin (NEURONTIN) 300 mg capsule Take 300 mg by mouth three (3) times daily.  sertraline (ZOLOFT) 100 mg tablet Take 200 mg by mouth daily.  docusate sodium (COLACE) 100 mg capsule Take 100 mg by mouth two (2) times a day. Past History Past Medical History: 
Past Medical History:  
Diagnosis Date  Diabetes ([address]  Gastrointestinal disorder  Hypertension  Other ill-defined conditions(799.89)   
 gout, glaucoma  Other ill-defined conditions(799.89)   
 high cholesterol Past Surgical History: 
Past Surgical History:  
Procedure Laterality Date  HX ORTHOPAEDIC    
 cervical surgery Family History: 
History reviewed. No pertinent family history. Social History: 
Social History Substance Use Topics  Smoking status: Never Smoker  Smokeless tobacco: Never Used  Alcohol use No  
 Comment: quit Allergies: 
No Known Allergies Review of Systems Review of Systems Constitutional: Negative for chills and fever. HENT: Positive for trouble swallowing. Negative for congestion, rhinorrhea, sneezing and sore throat. Eyes: Negative for redness and visual disturbance. Respiratory: Positive for cough (productive with phlegm ). Negative for shortness of breath. Cardiovascular: Negative for chest pain and leg swelling. Gastrointestinal: Positive for nausea. Negative for vomiting.  
     +dry heaving Genitourinary: Negative. Negative for difficulty urinating and frequency. Musculoskeletal: Negative for back pain, myalgias and neck stiffness.  
     +generalized body aches Skin: Negative for rash. Neurological: Positive for headaches (that radiates down through the pt's neck). Negative for dizziness, syncope and weakness. Hematological: Negative for adenopathy. All other systems reviewed and are negative. Physical Exam  
Physical Exam  
Constitutional: He is oriented to person, place, and time. He appears well-developed and well-nourished. HENT:  
Head: Normocephalic and atraumatic. Mouth/Throat: Oropharynx is clear and moist and mucous membranes are normal.  
Eyes: EOM are normal.  
Neck: Normal range of motion and full passive range of motion without pain. Neck supple. Cardiovascular: Normal rate, regular rhythm, normal heart sounds, intact distal pulses and normal pulses. No murmur heard. Pulmonary/Chest: Effort normal and breath sounds normal. No respiratory distress. He exhibits no tenderness. Abdominal: Soft. Normal appearance and bowel sounds are normal. There is generalized tenderness (diffuse). There is no rebound and no guarding. Neurological: He is alert and oriented to person, place, and time. He has normal strength. Skin: Skin is warm, dry and intact. No rash noted. No erythema. Psychiatric: He has a normal mood and affect. His speech is normal and behavior is normal. Judgment and thought content normal.  
Nursing note and vitals reviewed. Diagnostic Study Results Labs - Recent Results (from the past 12 hour(s)) CBC WITH AUTOMATED DIFF Collection Time: 08/29/18  1:33 PM  
Result Value Ref Range WBC 5.3 4.1 - 11.1 K/uL  
 RBC 3.66 (L) 4.10 - 5.70 M/uL  
 HGB 11.2 (L) 12.1 - 17.0 g/dL HCT 32.9 (L) 36.6 - 50.3 % MCV 89.9 80.0 - 99.0 FL  
 MCH 30.6 26.0 - 34.0 PG  
 MCHC 34.0 30.0 - 36.5 g/dL  
 RDW 11.9 11.5 - 14.5 % PLATELET 907 712 - 296 K/uL MPV 9.2 8.9 - 12.9 FL  
 NRBC 0.0 0  WBC ABSOLUTE NRBC 0.00 0.00 - 0.01 K/uL NEUTROPHILS 47 32 - 75 % LYMPHOCYTES 43 12 - 49 % MONOCYTES 9 5 - 13 % EOSINOPHILS 2 0 - 7 % BASOPHILS 0 0 - 1 % IMMATURE GRANULOCYTES 0 0.0 - 0.5 % ABS. NEUTROPHILS 2.5 1.8 - 8.0 K/UL  
 ABS. LYMPHOCYTES 2.3 0.8 - 3.5 K/UL  
 ABS. MONOCYTES 0.5 0.0 - 1.0 K/UL  
 ABS. EOSINOPHILS 0.1 0.0 - 0.4 K/UL  
 ABS. BASOPHILS 0.0 0.0 - 0.1 K/UL  
 ABS. IMM. GRANS. 0.0 0.00 - 0.04 K/UL  
 DF AUTOMATED METABOLIC PANEL, COMPREHENSIVE Collection Time: 08/29/18  1:33 PM  
Result Value Ref Range Sodium 138 136 - 145 mmol/L Potassium 4.3 3.5 - 5.1 mmol/L Chloride 105 97 - 108 mmol/L  
 CO2 25 21 - 32 mmol/L Anion gap 8 5 - 15 mmol/L Glucose 140 (H) 65 - 100 mg/dL BUN 14 6 - 20 MG/DL Creatinine 1.77 (H) 0.70 - 1.30 MG/DL  
 BUN/Creatinine ratio 8 (L) 12 - 20 GFR est AA 45 (L) >60 ml/min/1.73m2 GFR est non-AA 37 (L) >60 ml/min/1.73m2 Calcium 8.7 8.5 - 10.1 MG/DL Bilirubin, total 0.6 0.2 - 1.0 MG/DL  
 ALT (SGPT) 21 12 - 78 U/L  
 AST (SGOT) 26 15 - 37 U/L Alk. phosphatase 102 45 - 117 U/L Protein, total 7.0 6.4 - 8.2 g/dL Albumin 3.4 (L) 3.5 - 5.0 g/dL Globulin 3.6 2.0 - 4.0 g/dL A-G Ratio 0.9 (L) 1.1 - 2.2 LIPASE  Collection Time: 08/29/18  1:33 PM  
 Result Value Ref Range Lipase 104 73 - 393 U/L Radiologic Studies -  
CT Results  (Last 48 hours) 08/29/18 1513  CT ABD PELV WO CONT Final result Impression:  IMPRESSION:  
   
1. No acute abdominal or pelvic abnormality. 2. Stable CT findings of calcific pancreatitis. 3. Possible faint calculi in the gallbladder lumen without biliary ductal  
dilatation. 4. Advanced L1 compression deformity since the prior study. Correlate with known  
clinical history. 5. Other incidental changes as described. Narrative:  EXAM:  CT ABD PELV WO CONT INDICATION: abdominal pain, vomiting, SBO? Vladimir Shijourdan Patient reports abdominal pain for  
one month with anorexia, dry heaving and vomiting after eating. COMPARISON: 7/29/2016 CONTRAST:  None. TECHNIQUE:   
Thin axial images were obtained through the abdomen and pelvis. Coronal and  
sagittal reconstructions were generated. Oral contrast was not administered. CT  
dose reduction was achieved through use of a standardized protocol tailored for  
this examination and automatic exposure control for dose modulation. The absence of intravenous contrast material reduces the sensitivity for  
evaluation of the solid parenchymal organs of the abdomen. FINDINGS:   
LUNG BASES: Lingular atelectasis or scar. Calcified granuloma in the right  
middle lobe. INCIDENTALLY IMAGED HEART AND MEDIASTINUM: Unremarkable. LIVER: No mass or biliary dilatation. GALLBLADDER: There may be faint calculi in the dependent portion of the  
gallbladder, which is otherwise unremarkable. SPLEEN: Numerous calcifications but normal size and no parenchymal mass. PANCREAS: No soft tissue mass or ductal dilatation. Coarse calcifications are  
numerous throughout the pancreatic head, body and tail, as previously reported,  
consistent with chronic calcific pancreatitis. ADRENALS: Unremarkable. KIDNEYS/URETERS: No new mass, calculus, or hydronephrosis. A tiny right upper  
pole cortical mass less than 1 cm 2 small to characterize but is stable and  
likely represents a cortical cyst. A similar low density is noted in the left  
lower renal pole. STOMACH: Unremarkable. SMALL BOWEL: No dilatation or wall thickening. COLON: No dilatation or wall thickening. APPENDIX: Unremarkable. PERITONEUM: No ascites or pneumoperitoneum. RETROPERITONEUM: No lymphadenopathy or aortic aneurysm. The abdominal aorta is  
unremarkable for age, but the proximal common iliac arteries are ectatic, at 1.5  
cm caliber on the right and 1.5 cm also on the left. REPRODUCTIVE ORGANS: Unremarkable for age, with prostatic enlargement URINARY BLADDER: No mass or calculus. BONES: Compression deformity previously reported at L1 has progressed, with loss  
of 80% vertebral body height at this time, and very mild retropulsion of bone  
posteriorly. 4 mm anterolisthesis at L4-5 is stable, with multilevel  
degenerative disc disease. ADDITIONAL COMMENTS: N/A Medical Decision Making I am the first provider for this patient. I reviewed the vital signs, available nursing notes, past medical history, past surgical history, family history and social history. Vital Signs-Reviewed the patient's vital signs. Patient Vitals for the past 12 hrs: 
 Temp Pulse Resp BP SpO2  
08/29/18 1638 - 60 14 100/70 100 % 08/29/18 1630 - 62 17 - 100 % 08/29/18 1615 - 66 15 - 95 % 08/29/18 1600 - 64 14 - 100 % 08/29/18 1545 - 66 27 - 100 % 08/29/18 1530 - 64 16 - 100 % 08/29/18 1445 - 69 14 - 100 % 08/29/18 1430 - 66 10 - 98 % 08/29/18 1416 - 69 16 - 100 % 08/29/18 1415 - 71 24 - 100 % 08/29/18 1400 - 75 16 - 100 % 08/29/18 1317 - - - 120/83 -  
08/29/18 1313 98.6 °F (37 °C) 91 20 120/83 100 % Records Reviewed: Nursing Notes and Old Medical Records Provider Notes (Medical Decision Making):  
 DDx: gastroparesis, hiatal hernia, constipation ED Course:  
Initial assessment performed. The patients presenting problems have been discussed, and they are in agreement with the care plan formulated and outlined with them. I have encouraged them to ask questions as they arise throughout their visit. PROGRESS NOTE: 
3:53 PM 
Discussed CT finding with pt. Pt will follow up with Dr. Garcia Patient and try to get an earlier appointment. Written by Olen Spatz, ED Scribe, as dictated by Luisa Sewell MD. 
 
Labs and imaging reassuring for not acute process. Exam benign. Pt and friend agree to follow up with PCP. Critical Care Time: 0 minutes Disposition: 
DISCHARGE NOTE 
3:54 PM 
The patient has been re-evaluated and is ready for discharge. Reviewed available results with patient. Counseled pt on diagnosis and care plan. Pt has expressed understanding, and all questions have been answered. Pt agrees with plan and agrees to F/U as recommended, or return to the ED if their sxs worsen. Discharge instructions have been provided and explained to the pt, along with reasons to return to the ED. Written by Olen Spatz, ED Scribe, as dictated by Luisa Sewell MD. 
 
PLAN: 
1. Discharge Medication List as of 8/29/2018  3:54 PM  
  
START taking these medications Details  
acetaminophen (TYLENOL) 325 mg tablet Take 2 Tabs by mouth every four (4) hours as needed for Pain., Normal, Disp-30 Tab, R-0  
  
  
CONTINUE these medications which have NOT CHANGED Details FLUoxetine (PROZAC) 20 mg capsule Take  by mouth daily. , Historical Med  
  
atorvastatin (LIPITOR) 10 mg tablet Take 10 mg by mouth daily. , Historical Med  
  
oxyCODONE-acetaminophen (PERCOCET) 5-325 mg per tablet Take 1 Tab by mouth every four (4) hours as needed for Pain., Historical Med  
  
magnesium oxide 500 mg tab Take  by mouth., Historical Med  
  
clopidogrel (PLAVIX) 75 mg tab Take 75 mg by mouth., Historical Med  
  
 insulin NPH (NOVOLIN N, HUMULIN N) 100 unit/mL injection 12 units in AM & 8 units in PM, No Print, Disp-1 Vial, R-0  
  
metoprolol (LOPRESSOR) 50 mg tablet Take 1 Tab by mouth two (2) times a day., Print, Disp-60 Tab, R-0  
  
aspirin delayed-release 325 mg tablet Take 1 Tab by mouth daily. , Print, Disp-30 Tab, R-0  
  
cyanocobalamin (VITAMIN B-12) 1,000 mcg tablet Take 1,000 mcg by mouth daily. , Historical Med  
  
traZODone (DESYREL) 50 mg tablet Take 50 mg by mouth nightly., Historical Med  
  
butalbital-acetaminophen-caffeine (FIORICET, ESGIC) -40 mg per tablet Take 1 Tab by mouth every six (6) hours as needed for Headache. Indications: Migraine, Print, Disp-6 Tab, R-0  
  
naproxen (NAPROSYN) 250 mg tablet Take 1 Tab by mouth every twelve (12) hours as needed. , Print, Disp-6 Tab, R-0  
  
lidocaine (LIDODERM) 5 % Apply patch to the affected area for 12 hours a day and remove for 12 hours a day., Print, Disp-15 Each, R-0  
  
busPIRone (BUSPAR) 30 mg tablet Take 30 mg by mouth two (2) times a day., Historical Med  
  
clonazePAM (KLONOPIN) 1 mg tablet Take 1 mg by mouth nightly as needed (sleep, anxiety). , Historical Med  
  
gabapentin (NEURONTIN) 300 mg capsule Take 300 mg by mouth three (3) times daily. , Historical Med  
  
sertraline (ZOLOFT) 100 mg tablet Take 200 mg by mouth daily. , Historical Med  
  
docusate sodium (COLACE) 100 mg capsule Take 100 mg by mouth two (2) times a day.  , Historical Med 2. Follow-up Information Follow up With Details Comments Contact Info Luis Barron MD Call on 9/10/2018 for an earlier appointment if possible [address] 
 
[address] [place] [address],[address] [phone] [id number] [phone] [facility] EMERGENCY DEPT  As needed, If symptoms worsen [address] [facility],[address] [facility] Return to ED if worse Diagnosis Clinical Impression: 1.  Nonintractable headache, unspecified chronicity pattern, unspecified headache type 2. Closed compression fracture of first lumbar vertebra, initial encounter ([facility] [address] 3. Non-intractable vomiting, presence of nausea not specified, unspecified vomiting type 4. Chronic renal insufficiency, stage III (moderate) Attestations: This note is prepared by Chirag Bingham, acting as Scribe for Jorge Borden MD. Jorge Borden MD: The scribe's documentation has been prepared under my direction and personally reviewed by me in its entirety. I confirm that the note above accurately reflects all work, treatment, procedures, and medical decision making performed by me.

## 2018-08-29 NOTE — ED NOTES
 
Signed Dianne Mitchell RN   
8/29/2018   1:50 PM

## 2018-09-04 ENCOUNTER — APPOINTMENT (OUTPATIENT)
Dept: GENERAL RADIOLOGY | Age: 83
DRG: 897 | End: 2018-09-04
Attending: EMERGENCY MEDICINE
Payer: MEDICARE

## 2018-09-04 ENCOUNTER — HOSPITAL ENCOUNTER (INPATIENT)
Age: 83
LOS: 3 days | Discharge: HOME | DRG: 897 | End: 2018-09-07
Attending: EMERGENCY MEDICINE | Admitting: PSYCHIATRY & NEUROLOGY
Payer: MEDICARE

## 2018-09-04 DIAGNOSIS — F32.A DEPRESSION, UNSPECIFIED DEPRESSION TYPE: ICD-10-CM

## 2018-09-04 DIAGNOSIS — F11.20 NARCOTIC DEPENDENCE (HCC): ICD-10-CM

## 2018-09-04 DIAGNOSIS — R52 UNCONTROLLED PAIN: ICD-10-CM

## 2018-09-04 DIAGNOSIS — R05.9 COUGH: Primary | ICD-10-CM

## 2018-09-04 LAB
ALBUMIN SERPL-MCNC: 3.5 G/DL (ref 3.5–5)
ALBUMIN/GLOB SERPL: 1 {RATIO} (ref 1.1–2.2)
ALP SERPL-CCNC: 82 U/L (ref 45–117)
ALT SERPL-CCNC: 21 U/L (ref 12–78)
AMPHET UR QL SCN: NEGATIVE
ANION GAP SERPL CALC-SCNC: 5 MMOL/L (ref 5–15)
APPEARANCE UR: CLEAR
AST SERPL-CCNC: 30 U/L (ref 15–37)
BACTERIA URNS QL MICRO: NEGATIVE /HPF
BARBITURATES UR QL SCN: POSITIVE
BASOPHILS # BLD: 0 K/UL (ref 0–0.1)
BASOPHILS NFR BLD: 1 % (ref 0–1)
BENZODIAZ UR QL: NEGATIVE
BILIRUB SERPL-MCNC: 0.7 MG/DL (ref 0.2–1)
BILIRUB UR QL CFM: NEGATIVE
BUN SERPL-MCNC: 15 MG/DL (ref 6–20)
BUN/CREAT SERPL: 8 (ref 12–20)
CALCIUM SERPL-MCNC: 8.9 MG/DL (ref 8.5–10.1)
CANNABINOIDS UR QL SCN: NEGATIVE
CHLORIDE SERPL-SCNC: 104 MMOL/L (ref 97–108)
CO2 SERPL-SCNC: 25 MMOL/L (ref 21–32)
COCAINE UR QL SCN: NEGATIVE
COLOR UR: ABNORMAL
CREAT SERPL-MCNC: 1.77 MG/DL (ref 0.7–1.3)
DIFFERENTIAL METHOD BLD: ABNORMAL
DRUG SCRN COMMENT,DRGCM: ABNORMAL
EOSINOPHIL # BLD: 0 K/UL (ref 0–0.4)
EOSINOPHIL NFR BLD: 1 % (ref 0–7)
EPITH CASTS URNS QL MICRO: ABNORMAL /LPF
ERYTHROCYTE [DISTWIDTH] IN BLOOD BY AUTOMATED COUNT: 11.9 % (ref 11.5–14.5)
ETHANOL SERPL-MCNC: <10 MG/DL
GLOBULIN SER CALC-MCNC: 3.6 G/DL (ref 2–4)
GLUCOSE SERPL-MCNC: 119 MG/DL (ref 65–100)
GLUCOSE UR STRIP.AUTO-MCNC: NEGATIVE MG/DL
HCT VFR BLD AUTO: 32.5 % (ref 36.6–50.3)
HGB BLD-MCNC: 11.3 G/DL (ref 12.1–17)
HGB UR QL STRIP: NEGATIVE
IMM GRANULOCYTES # BLD: 0 K/UL (ref 0–0.04)
IMM GRANULOCYTES NFR BLD AUTO: 0 % (ref 0–0.5)
KETONES UR QL STRIP.AUTO: ABNORMAL MG/DL
LEUKOCYTE ESTERASE UR QL STRIP.AUTO: ABNORMAL
LYMPHOCYTES # BLD: 2.2 K/UL (ref 0.8–3.5)
LYMPHOCYTES NFR BLD: 45 % (ref 12–49)
MCH RBC QN AUTO: 31 PG (ref 26–34)
MCHC RBC AUTO-ENTMCNC: 34.8 G/DL (ref 30–36.5)
MCV RBC AUTO: 89.3 FL (ref 80–99)
METHADONE UR QL: NEGATIVE
MONOCYTES # BLD: 0.3 K/UL (ref 0–1)
MONOCYTES NFR BLD: 7 % (ref 5–13)
NEUTS SEG # BLD: 2.2 K/UL (ref 1.8–8)
NEUTS SEG NFR BLD: 47 % (ref 32–75)
NITRITE UR QL STRIP.AUTO: NEGATIVE
NRBC # BLD: 0 K/UL (ref 0–0.01)
NRBC BLD-RTO: 0 PER 100 WBC
OPIATES UR QL: NEGATIVE
PCP UR QL: NEGATIVE
PH UR STRIP: 5 [PH] (ref 5–8)
PLATELET # BLD AUTO: 188 K/UL (ref 150–400)
PMV BLD AUTO: 9.2 FL (ref 8.9–12.9)
POTASSIUM SERPL-SCNC: 3.8 MMOL/L (ref 3.5–5.1)
PROT SERPL-MCNC: 7.1 G/DL (ref 6.4–8.2)
PROT UR STRIP-MCNC: NEGATIVE MG/DL
RBC # BLD AUTO: 3.64 M/UL (ref 4.1–5.7)
RBC #/AREA URNS HPF: ABNORMAL /HPF (ref 0–5)
SODIUM SERPL-SCNC: 134 MMOL/L (ref 136–145)
SP GR UR REFRACTOMETRY: 1.02 (ref 1–1.03)
UA: UC IF INDICATED,UAUC: ABNORMAL
UROBILINOGEN UR QL STRIP.AUTO: 1 EU/DL (ref 0.2–1)
WBC # BLD AUTO: 4.8 K/UL (ref 4.1–11.1)
WBC URNS QL MICRO: ABNORMAL /HPF (ref 0–4)

## 2018-09-04 PROCEDURE — 36415 COLL VENOUS BLD VENIPUNCTURE: CPT | Performed by: EMERGENCY MEDICINE

## 2018-09-04 PROCEDURE — 74011250637 HC RX REV CODE- 250/637

## 2018-09-04 PROCEDURE — 74011250636 HC RX REV CODE- 250/636: Performed by: EMERGENCY MEDICINE

## 2018-09-04 PROCEDURE — 90791 PSYCH DIAGNOSTIC EVALUATION: CPT

## 2018-09-04 PROCEDURE — 81001 URINALYSIS AUTO W/SCOPE: CPT | Performed by: EMERGENCY MEDICINE

## 2018-09-04 PROCEDURE — 85025 COMPLETE CBC W/AUTO DIFF WBC: CPT | Performed by: EMERGENCY MEDICINE

## 2018-09-04 PROCEDURE — 80053 COMPREHEN METABOLIC PANEL: CPT | Performed by: EMERGENCY MEDICINE

## 2018-09-04 PROCEDURE — 80307 DRUG TEST PRSMV CHEM ANLYZR: CPT | Performed by: EMERGENCY MEDICINE

## 2018-09-04 PROCEDURE — 65220000003 HC RM SEMIPRIVATE PSYCH

## 2018-09-04 PROCEDURE — 71045 X-RAY EXAM CHEST 1 VIEW: CPT

## 2018-09-04 PROCEDURE — 99285 EMERGENCY DEPT VISIT HI MDM: CPT

## 2018-09-04 RX ORDER — ADHESIVE BANDAGE
30 BANDAGE TOPICAL DAILY PRN
Status: DISCONTINUED | OUTPATIENT
Start: 2018-09-04 | End: 2018-09-05

## 2018-09-04 RX ORDER — LORAZEPAM 1 MG/1
1 TABLET ORAL
Status: DISCONTINUED | OUTPATIENT
Start: 2018-09-04 | End: 2018-09-05

## 2018-09-04 RX ORDER — ZOLPIDEM TARTRATE 10 MG/1
10 TABLET ORAL
Status: DISCONTINUED | OUTPATIENT
Start: 2018-09-04 | End: 2018-09-05

## 2018-09-04 RX ORDER — IBUPROFEN 400 MG/1
400 TABLET ORAL
Status: DISCONTINUED | OUTPATIENT
Start: 2018-09-04 | End: 2018-09-05

## 2018-09-04 RX ORDER — BENZTROPINE MESYLATE 1 MG/ML
2 INJECTION INTRAMUSCULAR; INTRAVENOUS
Status: DISCONTINUED | OUTPATIENT
Start: 2018-09-04 | End: 2018-09-05

## 2018-09-04 RX ORDER — OLANZAPINE 5 MG/1
5 TABLET ORAL
Status: DISCONTINUED | OUTPATIENT
Start: 2018-09-04 | End: 2018-09-05

## 2018-09-04 RX ORDER — IBUPROFEN 200 MG
1 TABLET ORAL
Status: DISCONTINUED | OUTPATIENT
Start: 2018-09-04 | End: 2018-09-05

## 2018-09-04 RX ORDER — BENZTROPINE MESYLATE 2 MG/1
2 TABLET ORAL
Status: DISCONTINUED | OUTPATIENT
Start: 2018-09-04 | End: 2018-09-05

## 2018-09-04 RX ORDER — LORAZEPAM 2 MG/ML
2 INJECTION INTRAMUSCULAR
Status: DISCONTINUED | OUTPATIENT
Start: 2018-09-04 | End: 2018-09-05

## 2018-09-04 RX ORDER — ACETAMINOPHEN 325 MG/1
650 TABLET ORAL
Status: DISCONTINUED | OUTPATIENT
Start: 2018-09-04 | End: 2018-09-05

## 2018-09-04 RX ADMIN — ACETAMINOPHEN 650 MG: 325 TABLET, FILM COATED ORAL at 23:15

## 2018-09-04 RX ADMIN — ZOLPIDEM TARTRATE 10 MG: 10 TABLET ORAL at 23:12

## 2018-09-04 RX ADMIN — SODIUM CHLORIDE 500 ML: 900 INJECTION, SOLUTION INTRAVENOUS at 17:18

## 2018-09-04 NOTE — ED NOTES
TRANSFER - OUT REPORT: 
 
Verbal report given to Aydee Berry RN (name) on [facility]  being transferred to behavioral health (unit) for routine progression of care Report consisted of patients Situation, Background, Assessment and  
Recommendations(SBAR). Information from the following report(s) SBAR, ED Summary, [facility] and Recent Results was reviewed with the receiving nurse. Lines:    
 
Opportunity for questions and clarification was provided. Patient transported with: 
 Sol Bentley
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
## 2018-09-04 NOTE — ED TRIAGE NOTES
EMS brought pt into ED r/t pt's home health nurse concerned about pt's cough. Pt stated that he hasn't been coughing, unsure why he's here. EMS also noticed that pt's 90-day of percocet was empty that was filled 2 months or so ago. Pt is a poor historian.

## 2018-09-04 NOTE — IP AVS SNAPSHOT
[address] [facility] 
 
 
 [address]enn[address] Sadiq Ley Patient: Rossy Carranza MRN: [id number] PBO:[id number] A check amy indicates which time of day the medication should be taken. My Medications ASK your doctor about these medications Instructions Each Dose to Equal  
 Morning Noon Evening Bedtime  
 acetaminophen 500 mg tablet Commonly known as:  TYLENOL Your last dose was: Your next dose is: Take 500 mg by mouth every six (6) hours as needed for Pain. 500 mg  
    
   
   
   
  
 aspirin delayed-release 81 mg tablet Your last dose was: Your next dose is: Take 81 mg by mouth daily. Indications: prevention of transient ischemic attack 81 mg  
    
   
   
   
  
 atorvastatin 10 mg tablet Commonly known as:  LIPITOR Your last dose was: Your next dose is: Take 10 mg by mouth nightly. Indications: prevention of cerebrovascular accident, prevention of transient ischemic attack 10 mg  
    
   
   
   
  
 cholecalciferol 1,000 unit tablet Commonly known as:  VITAMIN D3 Your last dose was: Your next dose is: Take 1,000 Units by mouth daily. Indications: PREVENTION OF VITAMIN D DEFICIENCY  
 1000 Units  
    
   
   
   
  
 clopidogrel 75 mg Tab Commonly known as:  PLAVIX Your last dose was: Your next dose is: Take 75 mg by mouth daily. Indications: Cerebral Thromboembolism Prevention 75 mg FLUoxetine 20 mg capsule Commonly known as:  PROzac Your last dose was: Your next dose is: Take 20 mg by mouth daily. Indications: major depressive disorder 20 mg  
    
   
   
   
  
 guaiFENesin 400 mg tablet Commonly known as:  Mouna Pastures Your last dose was: Your next dose is: Take 400 mg by mouth every six (6) hours as needed for Congestion. Indications: Cough 400 mg  
    
   
   
   
  
 latanoprost 0.005 % ophthalmic solution Commonly known as:  Kendra White Your last dose was: Your next dose is:    
   
   
 Administer 1 Drop to both eyes nightly. Indications: Open Angle Glaucoma 1 Drop * LEVEMIR U-100 INSULIN 100 unit/mL injection Generic drug:  insulin detemir U-100 Your last dose was: Your next dose is:    
   
   
 15 Units by SubCUTAneous route daily. Indications: type 2 diabetes mellitus 15 Units * LEVEMIR U-100 INSULIN 100 unit/mL injection Generic drug:  insulin detemir U-100 Your last dose was: Your next dose is:    
   
   
 10 Units by SubCUTAneous route nightly. Indications: type 2 diabetes mellitus 10 Units  
    
   
   
   
  
 magnesium oxide 500 mg Tab Your last dose was: Your next dose is: Take 500 mg by mouth daily. Indications: hypomagnesemia  
 500 mg  
    
   
   
   
  
 timolol 0.5 % ophthalmic solution Commonly known as:  TIMOPTIC Your last dose was: Your next dose is:    
   
   
 Administer 1 Drop to both eyes daily. Indications: Open Angle Glaucoma 1 Drop  
    
   
   
   
  
 traZODone 50 mg tablet Commonly known as:  [place] Your last dose was: Your next dose is: Take 50 mg by mouth nightly as needed for Sleep. 50 mg  
    
   
   
   
  
 * Notice: This list has 2 medication(s) that are the same as other medications prescribed for you. Read the directions carefully, and ask your doctor or other care provider to review them with you.

## 2018-09-04 NOTE — IP AVS SNAPSHOT
303 [facility] 
 
 
 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 Antonette Batista on Monday, 9/10/18 at [address] [place] [address] 
 
[facility] [address] [phone] [facility] [address] will contact you tonight to schedule when they will see you on Saturday for an initial assesment for home health nursing [address] [place], [facility] [address] Phone: [phone] Fax: [phone] Discharge Orders None A check amy indicates which time of day the medication should be taken. My Medications ASK your doctor about these medications Instructions Each Dose to Equal  
 Morning Noon Evening Bedtime  
 acetaminophen 500 mg tablet Commonly known as:  TYLENOL Your last dose was: Your next dose is: Take 500 mg by mouth every six (6) hours as needed for Pain. 500 mg  
    
   
   
   
  
 aspirin delayed-release 81 mg tablet Your last dose was: Your next dose is: Take 81 mg by mouth daily. Indications: prevention of transient ischemic attack 81 mg  
    
   
   
   
  
 atorvastatin 10 mg tablet Commonly known as:  LIPITOR Your last dose was: Your next dose is: Take 10 mg by mouth nightly.  Indications: prevention of cerebrovascular accident, prevention of transient ischemic attack 10 mg  
    
   
   
   
  
 cholecalciferol 1,000 unit tablet Commonly known as:  VITAMIN D3 Your last dose was: Your next dose is: Take 1,000 Units by mouth daily. Indications: PREVENTION OF VITAMIN D DEFICIENCY  
 1000 Units  
    
   
   
   
  
 clopidogrel 75 mg Tab Commonly known as:  PLAVIX Your last dose was: Your next dose is: Take 75 mg by mouth daily. Indications: Cerebral Thromboembolism Prevention 75 mg FLUoxetine 20 mg capsule Commonly known as:  PROzac Your last dose was: Your next dose is: Take 20 mg by mouth daily. Indications: major depressive disorder 20 mg  
    
   
   
   
  
 guaiFENesin 400 mg tablet Commonly known as:  Arvis Graf Your last dose was: Your next dose is: Take 400 mg by mouth every six (6) hours as needed for Congestion. Indications: Cough 400 mg  
    
   
   
   
  
 latanoprost 0.005 % ophthalmic solution Commonly known as:  Verta Piety Your last dose was: Your next dose is:    
   
   
 Administer 1 Drop to both eyes nightly. Indications: Open Angle Glaucoma 1 Drop * LEVEMIR U-100 INSULIN 100 unit/mL injection Generic drug:  insulin detemir U-100 Your last dose was: Your next dose is:    
   
   
 15 Units by SubCUTAneous route daily. Indications: type 2 diabetes mellitus 15 Units * LEVEMIR U-100 INSULIN 100 unit/mL injection Generic drug:  insulin detemir U-100 Your last dose was: Your next dose is:    
   
   
 10 Units by SubCUTAneous route nightly. Indications: type 2 diabetes mellitus 10 Units  
    
   
   
   
  
 magnesium oxide 500 mg Tab Your last dose was: Your next dose is: Take 500 mg by mouth daily.  Indications: hypomagnesemia  
 500 mg  
 timolol 0.5 % ophthalmic solution Commonly known as:  TIMOPTIC Your last dose was: Your next dose is:    
   
   
 Administer 1 Drop to both eyes daily. Indications: Open Angle Glaucoma 1 Drop  
    
   
   
   
  
 traZODone 50 mg tablet Commonly known as:  Jan Woodward Your last dose was: Your next dose is: Take 50 mg by mouth nightly as needed for Sleep. 50 mg  
    
   
   
   
  
 * Notice: This list has 2 medication(s) that are the same as other medications prescribed for you. Read the directions carefully, and ask your doctor or other care provider to review them with you. Discharge Instructions DISCHARGE SUMMARY from Nurse PATIENT INSTRUCTIONS: 
 
 
F-face looks uneven A-arms unable to move or move unevenly S-speech slurred or non-existent T-time-call 911 as soon as signs and symptoms begin-DO NOT go Back to bed or wait to see if you get better-TIME IS BRAIN. Warning Signs of HEART ATTACK Call 911 if you have these symptoms: 
? Chest discomfort. Most heart attacks involve discomfort in the center of the chest that lasts more than a few minutes, or that goes away and comes back. It can feel like uncomfortable pressure, squeezing, fullness, or pain. ? Discomfort in other areas of the upper body. Symptoms can include pain or discomfort in one or both arms, the back, neck, jaw, or stomach. ? Shortness of breath with or without chest discomfort. ? Other signs may include breaking out in a cold sweat, nausea, or lightheadedness. Don't wait more than five minutes to call [address]! Fast action can save your life. Calling 911 is almost always the fastest way to get lifesaving treatment. Emergency Medical Services staff can begin treatment when they arrive  up to an hour sooner than if someone gets to the hospital by car. If I feel that I can not keep these promises and I am at risk of hurting myself or others, I will call the crisis office and speak with a crisis worker who will assist me during my crisis. [address] [place] [address] [place]                [phone] [facility],1 [address] [address]                      [phone] [facility]           [phone] [facility]            [phone] The discharge information has been reviewed with the patient. The patient verbalized understanding. Discharge medications reviewed with the patient and appropriate educational materials and side effects teaching were provided. ___________________________________________________________________________________________________________________________________ Profiterohart Announcement We are excited to announce that we are making your provider's discharge notes available to you in FilterSure. You will see these notes when they are completed and signed by the physician that discharged you from your recent hospital stay. If you have any questions or concerns about any information you see in Scratch Hardt, please call the Health Information Department where you were seen or reach out to your Primary Care Provider for more information about your plan of care. Introducing [facility]! Tod Caldwell introduces FilterSure patient portal. Now you can access parts of your medical record, email your doctor's office, and request medication refills online. 1. In your internet browser, go to [email or web address] 2. Click on the First Time User? Click Here link in the Sign In box. You will see the New Member Sign Up page. 3. Enter your FilterSure Access Code exactly as it appears below. You will not need to use this code after youve completed the sign-up process. If you do not sign up before the expiration date, you must request a new code. · Pairy Access Code: [email or web address] Expires: 11/27/2018  1:12 PM 
 
4. Enter the last four digits of your Social Security Number (xxxx) and Date of Birth (mm/dd/yyyy) as indicated and click Submit. You will be taken to the next sign-up page. 5. Create a Pairy ID. This will be your Pairy login ID and cannot be changed, so think of one that is secure and easy to remember. 6. Create a Pairy password. You can change your password at any time. 7. Enter your Password Reset Question and Answer. This can be used at a later time if you forget your password. 8. Enter your e-mail address. You will receive e-mail notification when new information is available in [address] 9. Click Sign Up. You can now view and download portions of your medical record. 10. Click the Download Summary menu link to download a portable copy of your medical information. If you have questions, please visit the Frequently Asked Questions section of the Pairy website. Remember, Pairy is NOT to be used for urgent needs. For medical emergencies, dial 911. Now available from your iPhone and Android! Introducing Errol Magana As a [facility] patient, I wanted to make you aware of our electronic visit tool called Errol Edwardjaidapalomo. [facility] 24/7 allows you to connect within minutes with a medical provider 24 hours a day, seven days a week via a mobile device or tablet or logging into a secure website from your computer. You can access Errol Magana from anywhere in the [place]. A virtual visit might be right for you when you have a simple condition and feel like you just dont want to get out of bed, or cant get away from work for an appointment, when your regular [facility] provider is not available (evenings, weekends or holidays), or when youre out of town and need minor care.   Electronic visits cost only $49 and if the [facility] 24/7 provider determines a prescription is needed to treat your condition, one can be electronically transmitted to a nearby pharmacy*. Please take a moment to enroll today if you have not already done so. The enrollment process is free and takes just a few minutes. To enroll, please download the [employer] 24/7 patria to your tablet or phone, or visit [email or web address] to enroll on your computer. And, as an [address] [place] [address] patient with a Channel M account, the results of your visits will be scanned into your electronic medical record and your primary care provider will be able to view the scanned results. We urge you to continue to see your regular [employer] provider for your ongoing medical care. And while your primary care provider may not be the one available when you seek a Biotheraajidafin virtual visit, the peace of mind you get from getting a real diagnosis real time can be priceless. For more information on Oxtex, view our Frequently Asked Questions (FAQs) at [email or web address]. Sincerely, 
 
Va Simmons MD 
Chief Medical Officer Ashley Ayse Toribio *:  certain medications cannot be prescribed via Oxtex Providers Seen During Your Hospitalization Provider Specialty Primary office phone Marcell Claude, MD Emergency Medicine [phone] Milly Mcallister MD Psychiatry [phone] Your Primary Care Physician (PCP) Primary Care Physician Office Phone Office Fax Shanice RODRIGUEZ [phone] You are allergic to the following No active allergies Recent Documentation Height Weight BMI Smoking Status 1.702 m 59 kg 20.37 kg/m2 Never Smoker Emergency Contacts Name Discharge Info Relation Home Work Mobile 29 Yadira Vaughan CAREGIVER [3] Daughter [21] [phone] Kavya Craft CAREGIVER [3] Friend [5] [phone] 19 Yadira Aiken CAREGIVER [3] Child [2] [phone] Patient Belongings The following personal items are in your possession at time of discharge: 
  Dental Appliances: None  Visual Aid: Glasses, At bedside      Home Medications: None   Jewelry: None  Clothing: Pants, Slippers    Other Valuables: None Please provide this summary of care documentation to your next provider. Signatures-by signing, you are acknowledging that this After Visit Summary has been reviewed with you and you have received a copy. Patient Signature:  ____________________________________________________________ Date:  ____________________________________________________________  
  
[facility] Provider Signature:  ____________________________________________________________ Date:  ____________________________________________________________

## 2018-09-04 NOTE — ED NOTES
Patient IV is infiltrated so IV was taken out. MD notified that patient did not receive hardly any fluids. MD reports that the sodium is not significant and does not need to be infused.

## 2018-09-04 NOTE — ED NOTES
Patient's belongings were collected (1patient belonging bag). Pt family is at beside. Suicide precautions in place.

## 2018-09-04 NOTE — ED NOTES
Emergency Department Nursing Plan of Care The Nursing Plan of Care is developed from the Nursing assessment and Emergency Department Attending provider initial evaluation. The plan of care may be reviewed in the ED Provider note. The Plan of Care was developed with the following considerations:  
Patient / Family readiness to learn indicated by:verbalized understanding Persons(s) to be included in education: patient and family Barriers to Learning/Limitations:No 
 
Signed Azul Joseph 9/4/2018   2:40 PM

## 2018-09-04 NOTE — ED NOTES
Patient is not HI and has never been aggressive to his girlfriend. Pt does not want to kill himself but wants doctors to control his pain. Patient reports he does not want to live if he is going to be in pain but does not want to kill himself.

## 2018-09-04 NOTE — ED NOTES
Patient's family reports that he has been talking about hurting himself. Pt family states \"he said he wanted to come to the hospital to have the doctor give him a shot that will kill me. \" Pt family reports that he has been depressed and has chronic pain that gives him anxiety and makes me more depressed.
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
## 2018-09-05 PROBLEM — F11.24 OPIOID-INDUCED DEPRESSIVE DISORDER WITH MODERATE OR SEVERE USE DISORDER (HCC): Status: ACTIVE | Noted: 2018-09-05

## 2018-09-05 LAB
GLUCOSE BLD STRIP.AUTO-MCNC: 127 MG/DL (ref 65–100)
GLUCOSE BLD STRIP.AUTO-MCNC: 202 MG/DL (ref 65–100)
GLUCOSE BLD STRIP.AUTO-MCNC: 275 MG/DL (ref 65–100)
SERVICE CMNT-IMP: ABNORMAL

## 2018-09-05 PROCEDURE — 74011636637 HC RX REV CODE- 636/637: Performed by: PSYCHIATRY & NEUROLOGY

## 2018-09-05 PROCEDURE — 82962 GLUCOSE BLOOD TEST: CPT

## 2018-09-05 PROCEDURE — 65220000003 HC RM SEMIPRIVATE PSYCH

## 2018-09-05 PROCEDURE — 74011250637 HC RX REV CODE- 250/637: Performed by: PSYCHIATRY & NEUROLOGY

## 2018-09-05 RX ORDER — OLANZAPINE 2.5 MG/1
2.5 TABLET ORAL
Status: DISCONTINUED | OUTPATIENT
Start: 2018-09-05 | End: 2018-09-07 | Stop reason: HOSPADM

## 2018-09-05 RX ORDER — INSULIN GLARGINE 100 [IU]/ML
10 INJECTION, SOLUTION SUBCUTANEOUS
Status: DISCONTINUED | OUTPATIENT
Start: 2018-09-05 | End: 2018-09-07 | Stop reason: HOSPADM

## 2018-09-05 RX ORDER — GABAPENTIN 100 MG/1
200 CAPSULE ORAL 3 TIMES DAILY
Status: DISCONTINUED | OUTPATIENT
Start: 2018-09-05 | End: 2018-09-07 | Stop reason: HOSPADM

## 2018-09-05 RX ORDER — TIMOLOL MALEATE 5 MG/ML
1 SOLUTION/ DROPS OPHTHALMIC DAILY
COMMUNITY
End: 2018-10-30

## 2018-09-05 RX ORDER — IBUPROFEN 200 MG
1 TABLET ORAL
Status: DISCONTINUED | OUTPATIENT
Start: 2018-09-05 | End: 2018-09-07 | Stop reason: HOSPADM

## 2018-09-05 RX ORDER — LANOLIN ALCOHOL/MO/W.PET/CERES
400 CREAM (GRAM) TOPICAL DAILY
Status: DISCONTINUED | OUTPATIENT
Start: 2018-09-06 | End: 2018-09-07 | Stop reason: HOSPADM

## 2018-09-05 RX ORDER — IBUPROFEN 400 MG/1
400 TABLET ORAL
Status: DISCONTINUED | OUTPATIENT
Start: 2018-09-05 | End: 2018-09-07 | Stop reason: HOSPADM

## 2018-09-05 RX ORDER — GUAIFENESIN 400 MG/1
400 TABLET ORAL
COMMUNITY
End: 2018-10-30

## 2018-09-05 RX ORDER — ATORVASTATIN CALCIUM 10 MG/1
10 TABLET, FILM COATED ORAL
Status: DISCONTINUED | OUTPATIENT
Start: 2018-09-05 | End: 2018-09-07 | Stop reason: HOSPADM

## 2018-09-05 RX ORDER — GABAPENTIN 300 MG/1
600 CAPSULE ORAL 3 TIMES DAILY
Status: DISCONTINUED | OUTPATIENT
Start: 2018-09-05 | End: 2018-09-05 | Stop reason: DRUGHIGH

## 2018-09-05 RX ORDER — CLOPIDOGREL BISULFATE 75 MG/1
75 TABLET ORAL DAILY
Status: DISCONTINUED | OUTPATIENT
Start: 2018-09-06 | End: 2018-09-07 | Stop reason: HOSPADM

## 2018-09-05 RX ORDER — ACETAMINOPHEN 500 MG
500 TABLET ORAL
COMMUNITY
End: 2018-10-30

## 2018-09-05 RX ORDER — FLUOXETINE HYDROCHLORIDE 20 MG/1
20 CAPSULE ORAL DAILY
Status: DISCONTINUED | OUTPATIENT
Start: 2018-09-06 | End: 2018-09-07 | Stop reason: HOSPADM

## 2018-09-05 RX ORDER — MELATONIN
1000 DAILY
COMMUNITY
End: 2018-10-30

## 2018-09-05 RX ORDER — ADHESIVE BANDAGE
30 BANDAGE TOPICAL DAILY PRN
Status: DISCONTINUED | OUTPATIENT
Start: 2018-09-05 | End: 2018-09-07 | Stop reason: HOSPADM

## 2018-09-05 RX ORDER — MELATONIN
1000 DAILY
Status: DISCONTINUED | OUTPATIENT
Start: 2018-09-06 | End: 2018-09-07 | Stop reason: HOSPADM

## 2018-09-05 RX ORDER — ASPIRIN 81 MG/1
81 TABLET ORAL DAILY
Status: DISCONTINUED | OUTPATIENT
Start: 2018-09-06 | End: 2018-09-07 | Stop reason: HOSPADM

## 2018-09-05 RX ORDER — TRAZODONE HYDROCHLORIDE 50 MG/1
50 TABLET ORAL
Status: DISCONTINUED | OUTPATIENT
Start: 2018-09-05 | End: 2018-09-07 | Stop reason: HOSPADM

## 2018-09-05 RX ORDER — BENZTROPINE MESYLATE 1 MG/1
1 TABLET ORAL
Status: DISCONTINUED | OUTPATIENT
Start: 2018-09-05 | End: 2018-09-07 | Stop reason: HOSPADM

## 2018-09-05 RX ORDER — ZOLPIDEM TARTRATE 5 MG/1
5 TABLET ORAL
Status: DISCONTINUED | OUTPATIENT
Start: 2018-09-05 | End: 2018-09-05

## 2018-09-05 RX ORDER — ASPIRIN 81 MG/1
81 TABLET ORAL DAILY
COMMUNITY
End: 2018-10-30

## 2018-09-05 RX ORDER — BENZTROPINE MESYLATE 1 MG/ML
1 INJECTION INTRAMUSCULAR; INTRAVENOUS
Status: DISCONTINUED | OUTPATIENT
Start: 2018-09-05 | End: 2018-09-07 | Stop reason: HOSPADM

## 2018-09-05 RX ORDER — LATANOPROST 50 UG/ML
1 SOLUTION/ DROPS OPHTHALMIC
COMMUNITY
End: 2018-10-30

## 2018-09-05 RX ORDER — INSULIN GLARGINE 100 [IU]/ML
15 INJECTION, SOLUTION SUBCUTANEOUS DAILY
Status: DISCONTINUED | OUTPATIENT
Start: 2018-09-06 | End: 2018-09-07 | Stop reason: HOSPADM

## 2018-09-05 RX ORDER — ACETAMINOPHEN 325 MG/1
650 TABLET ORAL
Status: DISCONTINUED | OUTPATIENT
Start: 2018-09-05 | End: 2018-09-07 | Stop reason: HOSPADM

## 2018-09-05 RX ADMIN — ATORVASTATIN CALCIUM 10 MG: 10 TABLET, FILM COATED ORAL at 22:07

## 2018-09-05 RX ADMIN — INSULIN GLARGINE 10 UNITS: 100 INJECTION, SOLUTION SUBCUTANEOUS at 22:06

## 2018-09-05 RX ADMIN — TRAZODONE HYDROCHLORIDE 50 MG: 50 TABLET ORAL at 22:09

## 2018-09-05 RX ADMIN — GABAPENTIN 200 MG: 100 CAPSULE ORAL at 18:46

## 2018-09-05 NOTE — BH NOTES
Admitted patient this evening for suicidal ideas around inability to obtain enough medications for pain. He stated he has taken medications, especially percocet for Many years, when asked further, he said at least 20 years! Noticed also he take Fioricet For headaches. His pain is generalized; old back, shoulder, neck surgery. Patient is very Hard of Hearing, so was difficult to ascertain why & when. Patient did not exhibit s/s of  
Opiate w/d, but complained of stomach ache @ 21:30. Patient orientated to unit, ambulates well without cane. Will continue to monitor patient and assess needs.

## 2018-09-05 NOTE — PROGRESS NOTES
Problem: Suicide/Homicide (Adult/Pediatric) Goal: *STG/LTG:  No longer expresses self destructive or suicidal/homicidal thoughts Outcome: Progressing Towards Goal 
Patient denied SI/HI. Patient stated that he did not want to kill himself. He stated that he was mad when he said that in the ED. Patient is diabetic. Patient stated that he has trouble chewing foods and so a pureed diet was ordered for him. Patient has been coughing (more like hacking) sputum and spit. Patient has been concerned with receiving his regular medications. Patient has been pleasant, and has displayed no irritability or aggression. . Patient is oriented x3. Patient denied A/V hallucinations. Patient ambulates with a walker. Will continue to monitor patient for depression. Medical doctor to see patient this afternoon.

## 2018-09-05 NOTE — H&P
History and Physical 
 
Subjective:  
 
Carlito Daugherty is a 80 y.o. male with past medical hx signifiant for DM, HTN, Glucoma, High cholestral and glucoma. Per documentation found in the databank, Pt admitted under a voluntary basis for suicidal ideation proving to be an imminent danger to self and an inability to care for self. Pt is a BLACK OR  male with a past psychiatric history significant for chronic pain, who is admitted at this time with complaints of (and/or evidence of) the following emotional symptoms: suicidal thoughts/threats. Additional symptomatology include chronic pain. The above symptoms have been present for 1+ weeks. These symptoms are of moderate severity. These symptoms are constant. The patient's condition has been precipitated by medical illness and psychosocial stressors. Patient's condition made worse by mis-use of opiate medication. UDS: +barbiturates; BAL=0. Pt is a very poor historian. Noted to be vomiting or coughing after eating. O/w showing no signs of acute distress. Past Medical History:  
Diagnosis Date  Diabetes ([facility] [address]  Gastrointestinal disorder  Hypertension  Other ill-defined conditions(799.89)   
 gout, glaucoma  Other ill-defined conditions(799.89)   
 high cholesterol Past Surgical History:  
Procedure Laterality Date  HX ORTHOPAEDIC    
 cervical surgery FHx: not available Social History Substance Use Topics  Smoking status: Never Smoker  Smokeless tobacco: Never Used  Alcohol use No  
   Comment: quit Prior to Admission medications Medication Sig Start Date End Date Taking? Authorizing Provider  
insulin detemir U-100 (LEVEMIR U-100 INSULIN) 100 unit/mL injection 15 Units by SubCUTAneous route daily.  Indications: type 2 diabetes mellitus   Yes Historical Provider  
insulin detemir U-100 (LEVEMIR U-100 INSULIN) 100 unit/mL injection 10 Units by SubCUTAneous route nightly. Indications: type 2 diabetes mellitus   Yes Historical Provider  
latanoprost (XALATAN) 0.005 % ophthalmic solution Administer 1 Drop to both eyes nightly. Indications: Open Angle Glaucoma   Yes Historical Provider  
timolol (TIMOPTIC) 0.5 % ophthalmic solution Administer 1 Drop to both eyes daily. Indications: Open Angle Glaucoma   Yes Historical Provider  
cholecalciferol (VITAMIN D3) 1,000 unit tablet Take 1,000 Units by mouth daily. Indications: PREVENTION OF VITAMIN D DEFICIENCY   Yes Historical Provider  
guaiFENesin (ORGANIDIN) 400 mg tablet Take 400 mg by mouth every six (6) hours as needed for Congestion. Indications: Cough   Yes Historical Provider  
acetaminophen (TYLENOL) 500 mg tablet Take 500 mg by mouth every six (6) hours as needed for Pain. Yes Historical Provider  
aspirin delayed-release 81 mg tablet Take 81 mg by mouth daily. Indications: prevention of transient ischemic attack   Yes Historical Provider FLUoxetine (PROZAC) 20 mg capsule Take 20 mg by mouth daily. Indications: major depressive disorder   Yes Annie Ramirez MD  
atorvastatin (LIPITOR) 10 mg tablet Take 10 mg by mouth nightly. Indications: prevention of cerebrovascular accident, prevention of transient ischemic attack   Yes Annie Ramirez MD  
traZODone (DESYREL) 50 mg tablet Take 50 mg by mouth nightly as needed for Sleep. Yes Annie Ramirez MD  
magnesium oxide 500 mg tab Take 500 mg by mouth daily. Indications: hypomagnesemia   Yes Annie Ramirez MD  
clopidogrel (PLAVIX) 75 mg tab Take 75 mg by mouth daily. Indications: Cerebral Thromboembolism Prevention   Yes Annie Ramirez MD  
 
No Known Allergies Review of Systems: 
Constitutional: negative Eyes: negative Ears, Nose, Mouth, Throat, and Face: negative Respiratory: negative Cardiovascular: negative Gastrointestinal: negative Genitourinary:negative Integument/Breast: negative Hematologic/Lymphatic: negative Musculoskeletal:negative Neurological: negative Behavioral/Psychiatric: Depression. Endocrine: negative Allergic/Immunologic: negative Objective: Intake and Output:   
  
  
 
Physical Exam:  
Visit Vitals  /72 (BP 1 Location: Left arm, BP Patient Position: At rest)  Pulse 62  Temp 97.7 °F (36.5 °C)  Resp 16  
 Ht 5' 7\" (1.702 m)  Wt 59 kg (130 lb 1.1 oz)  SpO2 97%  BMI 20.37 kg/m2 General:  Alert, cooperative, no distress, appears stated age. Head:  Normocephalic, without obvious abnormality, atraumatic. Eyes:  Conjunctivae/corneas clear. PERRL, EOMs intact. Ears:  Normal external ear canals both ears. Nose: Nares normal. Septum midline. Mucosa normal. No drainage or sinus tenderness. Throat: Lips, mucosa, and tongue normal. Teeth and gums normal.  
Neck: Supple, symmetrical, trachea midline, no adenopathy, thyroid: no enlargement/tenderness/nodules. Back:   Symmetric, no curvature. ROM normal. No CVA tenderness. Lungs:   Clear to auscultation bilaterally. Chest wall:  No tenderness or deformity. Heart:  Regular rate and rhythm, S1, S2 normal, no murmur, click, rub or gallop. Abdomen:   Soft, non-tender. Bowel sounds normal. No masses,  No organomegaly. Extremities: Extremities normal, atraumatic, no cyanosis or edema. Pulses: 2+ distally. Skin: Skin color, texture, turgor normal. No rashes or lesions Lymph nodes: No Cervical or supraclavicular adenopathy. Neurologic: CNII-XII intact. Normal strength, sensation intact,  reflexes 2/4 distally. Data Review:  
Recent Results (from the past 24 hour(s)) GLUCOSE, POC Collection Time: 09/05/18 10:09 AM  
Result Value Ref Range Glucose (POC) 127 (H) 65 - 100 mg/dL Performed by Thea Tipton GLUCOSE, POC Collection Time: 09/05/18  4:11 PM  
Result Value Ref Range Glucose (POC) 275 (H) 65 - 100 mg/dL Performed by Cat Hays Assessment:  
 
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 [address] (9/5/2018) Active Problems: 
  Depressive disorder (9/4/2018) DM 
 
HTN Glucoma HLD 
 
? CAD Plan:  
 
Restart Lipitor Restart Insulin regimen Retstart antihypertensive Restart glucoma eye drops Restart Plavix/Aspirin No VTE prophylaxis indicated or necessary at this time. Signed By: Elver Parada MD   
 September 5, 2018

## 2018-09-05 NOTE — BH NOTES
GROUP THERAPY PROGRESS NOTE The patient Yoli disla 80 y.o. male is participating in [facility]. Group time: 45 minutes Personal goal for participation: To participate in happiness game Goal orientation:  personal 
 
Group therapy participation: minimal 
 
Therapeutic interventions reviewed and discussed: life scenarios exploring the pursuit of happiness Impression of participation:  The patient was present-arrived late Harpreet Flores 9/5/2018  1:33 PM

## 2018-09-05 NOTE — BH NOTES
Pt observed resting quietly, respirations even and unlabored. No s/s distress noted, no complaints voiced. Pt requested and received Ambien and Tylenol. Pt slept 6.5 hours. Will continue Q 15 minute monitoring for safety.

## 2018-09-05 NOTE — BH NOTES
GROUP THERAPY PROGRESS NOTE Oscar Way is participating in Rule.. Group time: 30 minutes Personal goal for participation:  Unit orientation Goal orientation: [facility] Group therapy participation: active Therapeutic interventions reviewed and discussed: Yes Impression of participation: good
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
## 2018-09-05 NOTE — PROGRESS NOTES
[facility] Medication Reconciliation Recommendations/Findings:  
1) Patient was being followed by Lois Gallardo on [facility] but he is no longer a client there as of 9/1/18.   
2) Patient stated that the only medications he's on are the ones he brought with him to the hospital.  Patient uses multiple pharmacies to fill medications and it appears compliance may be questionable. Patient does manage his own medications at home and is a poor historian. When asked if he takes a certain medication, his reply is \"if it's in the bag, then I take it. \" 3) Patient was unsure of the type of insulin he's on but knew his dose. Type of insulin and dose confirmed with [facility] pharmacy. 4) Patient stated that he was instructed to take acetaminophen for his pain in place of the oxycodone-acetaminophen but patient stated it's not working. Last filled oxycodone-acetaminophen 5-325 mg take 1 tablet every 8 hours as needed (quantity 90) on 7/25/18. Patient's bottle was empty. Removed from PTA list since patient was supposed to stop taking and does not have a current outpatient provider to write for this prescription. Additions: insulin detemir, latanoprost eye drops, timolol eye drops, cholecalciferol, guaifenesin Modifications: Acetaminophen (changed from 325 mg to 500 mg tablets), aspirin (changed from 325 mg to 81 mg), fluoxetine (added frequency), magnesium (added frequency), trazodone (changed frequency) Deletions: buspirone, butalbital-acetaminophen-caffeine, cyanocobalamin, docusate, gabapentin, insulin NPH, lidocaine 5%, metoprolol tartrate, naproxen, sertraline, oxycodone-acetaminophen (see #4 above) Total Time Spent: 50 minutes Information obtained from: [employer] [phone]), [facility] ([phone]), [facility], , patient's medication bottles, patient interview Patient allergies: Allergies as of 09/04/2018  (No Known Allergies) Prior to Admission Medications Prescriptions Last Dose Informant Patient Reported? Taking? FLUoxetine (PROZAC) 20 mg capsule  Self Yes Yes Sig: Take 20 mg by mouth daily. Indications: major depressive disorder  
acetaminophen (TYLENOL) 500 mg tablet   Yes Yes Sig: Take 500 mg by mouth every six (6) hours as needed for Pain. aspirin delayed-release 81 mg tablet   Yes Yes Sig: Take 81 mg by mouth daily. Indications: prevention of transient ischemic attack  
atorvastatin (LIPITOR) 10 mg tablet   Yes Yes Sig: Take 10 mg by mouth nightly. Indications: prevention of cerebrovascular accident, prevention of transient ischemic attack  
cholecalciferol (VITAMIN D3) 1,000 unit tablet   Yes Yes Sig: Take 1,000 Units by mouth daily. Indications: PREVENTION OF VITAMIN D DEFICIENCY  
clopidogrel (PLAVIX) 75 mg tab   Yes Yes Sig: Take 75 mg by mouth daily. Indications: Cerebral Thromboembolism Prevention  
guaiFENesin (ORGANIDIN) 400 mg tablet   Yes Yes Sig: Take 400 mg by mouth every six (6) hours as needed for Congestion. Indications: Cough  
insulin detemir U-100 (LEVEMIR U-100 INSULIN) 100 unit/mL injection   Yes Yes Sig: 15 Units by SubCUTAneous route daily. Indications: type 2 diabetes mellitus  
insulin detemir U-100 (LEVEMIR U-100 INSULIN) 100 unit/mL injection   Yes Yes Sig: 10 Units by SubCUTAneous route nightly. Indications: type 2 diabetes mellitus  
latanoprost (XALATAN) 0.005 % ophthalmic solution   Yes Yes Sig: Administer 1 Drop to both eyes nightly. Indications: Open Angle Glaucoma  
magnesium oxide 500 mg tab   Yes Yes Sig: Take 500 mg by mouth daily. Indications: hypomagnesemia  
timolol (TIMOPTIC) 0.5 % ophthalmic solution   Yes Yes Sig: Administer 1 Drop to both eyes daily. Indications: Open Angle Glaucoma  
traZODone (DESYREL) 50 mg tablet   Yes Yes Sig: Take 50 mg by mouth nightly as needed for Sleep. Facility-Administered Medications: None Thank you, Baron Lozano, PHARMD, BCPS

## 2018-09-06 LAB
GLUCOSE BLD STRIP.AUTO-MCNC: 216 MG/DL (ref 65–100)
GLUCOSE BLD STRIP.AUTO-MCNC: 74 MG/DL (ref 65–100)
GLUCOSE BLD STRIP.AUTO-MCNC: 80 MG/DL (ref 65–100)
SERVICE CMNT-IMP: ABNORMAL
SERVICE CMNT-IMP: NORMAL
SERVICE CMNT-IMP: NORMAL

## 2018-09-06 PROCEDURE — 82962 GLUCOSE BLOOD TEST: CPT

## 2018-09-06 PROCEDURE — 74011636637 HC RX REV CODE- 636/637: Performed by: PSYCHIATRY & NEUROLOGY

## 2018-09-06 PROCEDURE — 65220000003 HC RM SEMIPRIVATE PSYCH

## 2018-09-06 PROCEDURE — 92610 EVALUATE SWALLOWING FUNCTION: CPT

## 2018-09-06 PROCEDURE — 74011250637 HC RX REV CODE- 250/637: Performed by: PSYCHIATRY & NEUROLOGY

## 2018-09-06 RX ORDER — MAGNESIUM SULFATE 100 %
4 CRYSTALS MISCELLANEOUS AS NEEDED
Status: DISCONTINUED | OUTPATIENT
Start: 2018-09-06 | End: 2018-09-07 | Stop reason: HOSPADM

## 2018-09-06 RX ORDER — DEXTROSE 50 % IN WATER (D50W) INTRAVENOUS SYRINGE
25-50 AS NEEDED
Status: DISCONTINUED | OUTPATIENT
Start: 2018-09-06 | End: 2018-09-07 | Stop reason: HOSPADM

## 2018-09-06 RX ADMIN — ASPIRIN 81 MG: 81 TABLET ORAL at 09:26

## 2018-09-06 RX ADMIN — Medication 400 MG: at 09:26

## 2018-09-06 RX ADMIN — GABAPENTIN 200 MG: 100 CAPSULE ORAL at 06:30

## 2018-09-06 RX ADMIN — CLOPIDOGREL BISULFATE 75 MG: 75 TABLET ORAL at 09:26

## 2018-09-06 RX ADMIN — ATORVASTATIN CALCIUM 10 MG: 10 TABLET, FILM COATED ORAL at 21:00

## 2018-09-06 RX ADMIN — INSULIN GLARGINE 15 UNITS: 100 INJECTION, SOLUTION SUBCUTANEOUS at 09:23

## 2018-09-06 RX ADMIN — VITAMIN D 1000 UNITS: 25 TAB ORAL at 09:26

## 2018-09-06 RX ADMIN — GABAPENTIN 200 MG: 100 CAPSULE ORAL at 17:33

## 2018-09-06 RX ADMIN — INSULIN GLARGINE 10 UNITS: 100 INJECTION, SOLUTION SUBCUTANEOUS at 21:00

## 2018-09-06 RX ADMIN — GABAPENTIN 200 MG: 100 CAPSULE ORAL at 12:12

## 2018-09-06 RX ADMIN — TRAZODONE HYDROCHLORIDE 50 MG: 50 TABLET ORAL at 20:58

## 2018-09-06 RX ADMIN — FLUOXETINE HYDROCHLORIDE 20 MG: 20 CAPSULE ORAL at 09:26

## 2018-09-06 NOTE — BH NOTES
Behavior The patient was alert and oriented and seen on the unit. His hygiene and nutritional intake was adequate. His behavior remains appropriate in the milieu with peers and staff, but presented friendly. Patient contracts for safety. Pt was medication compliant, per medication nurse. Patient exhibited an increase in his affect. Mood pleasant upon approach. Intervention 1:1 interaction to assess mood and thought process. Staff offerred assistance as needed. Response Pt denied S/H ideations. Pt denied hearing voices at this time. Pt showed no signs of distress, agitation or anxiety. Pt attended group today. Plan Plan is to assess mood and thought process. Staff encouraging verbalization of issues and feelings in the appropriate manner. Staff will monitor for changes. Will continue to check on pt Q15 minutes.
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
## 2018-09-06 NOTE — BH NOTES
Patient up & OOB for meals, compliant with same & medications. Pt. Complaining about Vomiting after meals and with copious amount of phlegm. Noticed phlegm and not vomitus. Compliant with medications & meals. Will monitor patient's status & assess needs.

## 2018-09-06 NOTE — PROGRESS NOTES
Speech LAnguage Pathology bedside swallow evaluation Patient: Jorge Luis Malloy (45 y.o. male) Date: 9/6/2018 Primary Diagnosis: Depressive disorder Precautions:     
 
ASSESSMENT : 
Based on the objective data described below, the patient presents with mild oropharyngeal dysphagia. Patient edentulous and only ate purees and thin liquids this date. Patient reported that he has only been eating soft food as his dentures are at home. Patient with delayed swallow initiation and intermittent multiple swallows which could indicate pharyngeal residue. However, strongly suspect esophageal dysphagia. After thin liquids, patient began coughing and heaving. Patient with excessive saliva production that he spit out. Patient reported sometimes food will come up as well. ?if patient with GERD and water brash. Recommend GI consult, and consider Esophagram as well. PLAN : 
Recommendations and Planned Interventions: 
--Continue diet as tolerated 
--GI consult. Per RN, GI no longer comes to this hospital, so consider esophagram 
--SLP to follow peripherally if any other treatment needed Frequency/Duration: Patient will be followed by speech-language pathology as needed Discharge Recommendations: None SUBJECTIVE:  
Patient stated I spit up all this phlegm.  Patient alert, cooperative. Oriented to person, place, situation. Disoriented to time. OBJECTIVE:  
 
Past Medical History:  
Diagnosis Date  Diabetes ([facility] [address]  Gastrointestinal disorder  Hypertension  Other ill-defined conditions(799.89)   
 gout, glaucoma  Other ill-defined conditions(799.89)   
 high cholesterol Past Surgical History:  
Procedure Laterality Date  HX ORTHOPAEDIC    
 cervical surgery Prior Level of Function/Home Situation:  
  
Diet prior to admission:  
Current Diet:  Regular/thin  
Cognitive and Communication Status: 
Neurologic State: Alert, Confused Orientation Level: Oriented to person, Oriented to place, Oriented to situation, Disoriented to time Cognition: Decreased attention/concentration, Follows commands Perception: Appears intact Perseveration: No perseveration noted Safety/Judgement: Not assessed Oral Assessment: 
Oral Assessment Labial: No impairment Dentition: Edentulous; Other (comment) (reported his dentures are at home) Oral Hygiene: moist oral mucosa, white foamy saliva on posterior pharyngeal wall Lingual: Decreased rate;Decreased strength;Right deviation Velum: No impairment Mandible: No impairment P.O. Trials: 
Patient Position: upright in bed Vocal quality prior to P.O.: No impairment Consistency Presented: Ice chips; Thin liquid;Puree How Presented: Self-fed/presented;Cup/sip;Cup/gulp;Straw;Successive swallows;Spoon Bolus Acceptance: No impairment Bolus Formation/Control: No impairment Propulsion: No impairment Oral Residue: None Initiation of Swallow: Delayed (# of seconds) (intermittently) Laryngeal Elevation: Functional 
Aspiration Signs/Symptoms: Strong cough; Other (comment) (with regurgitation) Pharyngeal Phase Characteristics: Multiple swallows; Suspected pharyngeal residue (vs due to esophageal dysphagia) Effective Modifications: None Cues for Modifications: None Oral Phase Severity: Other (comment) (WFL for puree/thin) Pharyngeal Phase Severity : Mild (WFL) NOMS:  
The NOMS functional outcome measure was used to quantify this patient's level of swallowing impairment. Based on the NOMS, the patient was determined to be at level 3 for swallow function G Codes: In compliance with CMSs Claims Based Outcome Reporting, the following G-code set was chosen for this patient based the use of the NOMS functional outcome to quantify this patient's level of swallowing impairment.  
 
Using the NOMS, the patient was determined to be at level 3 for swallow function which correlates with the CL= 60-79% level of severity. Based on the objective assessment provided within this note, the current, goal, and discharge g-codes are as follows: 
 
Swallow  Swallowing: 
 Swallow Current Status CL= 60-79%  Swallow Goal Status CL= 60-79% NOMS Swallowing Levels: 
Level 1 (CN): NPO Level 2 (CM): NPO but takes consistency in therapy Level 3 (CL): Takes less than 50% of nutrition p.o. and continues with nonoral feedings; and/or safe with mod cues; and/or max diet restriction Level 4 (CK): Safe swallow but needs mod cues; and/or mod diet restriction; and/or still requires some nonoral feeding/supplements Level 5 (CJ): Safe swallow with min diet restriction; and/or needs min cues Level 6 (CI): Independent with p.o.; rare cues; usually self cues; may need to avoid some foods or needs extra time Level 7 (CH): Independent for all p.o. FRANCIA. (2003). National Outcomes Measurement System (NOMS): Adult Speech-Language Pathology User's Guide. Pain: 
Pain Scale 1: Numeric (0 - 10) Pain Intensity 1: 0 After treatment:  
[]            Patient left in no apparent distress sitting up in chair 
[x]            Patient left in no apparent distress in bed 
[x]            Call bell left within reach [x]            Nursing notified 
[]            Caregiver present 
[]            Bed alarm activated COMMUNICATION/EDUCATION:  
The patients plan of care including recommendations, planned interventions, and recommended diet changes were discussed with: Registered Nurse. [x]            Patient/family have participated as able in goal setting and plan of care. [x]            Patient/family agree to work toward stated goals and plan of care. []            Patient understands intent and goals of therapy, but is neutral about his/her participation. []            Patient is unable to participate in goal setting and plan of care.  
 
Thank you for this referral. 
 Elzbieta Chow, SLP Time Calculation: 20 mins

## 2018-09-07 VITALS
HEIGHT: 67 IN | WEIGHT: 130.07 LBS | OXYGEN SATURATION: 97 % | TEMPERATURE: 97.4 F | HEART RATE: 86 BPM | RESPIRATION RATE: 18 BRPM | DIASTOLIC BLOOD PRESSURE: 59 MMHG | SYSTOLIC BLOOD PRESSURE: 91 MMHG | BODY MASS INDEX: 20.42 KG/M2

## 2018-09-07 LAB
GLUCOSE BLD STRIP.AUTO-MCNC: 100 MG/DL (ref 65–100)
SERVICE CMNT-IMP: NORMAL

## 2018-09-07 PROCEDURE — 74011636637 HC RX REV CODE- 636/637: Performed by: PSYCHIATRY & NEUROLOGY

## 2018-09-07 PROCEDURE — 74011250637 HC RX REV CODE- 250/637: Performed by: PSYCHIATRY & NEUROLOGY

## 2018-09-07 PROCEDURE — 82962 GLUCOSE BLOOD TEST: CPT

## 2018-09-07 RX ORDER — TRAZODONE HYDROCHLORIDE 50 MG/1
50 TABLET ORAL
Qty: 14 TAB | Refills: 0 | Status: SHIPPED | OUTPATIENT
Start: 2018-09-07

## 2018-09-07 RX ORDER — FLUOXETINE HYDROCHLORIDE 20 MG/1
20 CAPSULE ORAL DAILY
Qty: 14 CAP | Refills: 0 | Status: SHIPPED | OUTPATIENT
Start: 2018-09-08 | End: 2018-10-30

## 2018-09-07 RX ORDER — GABAPENTIN 100 MG/1
200 CAPSULE ORAL 3 TIMES DAILY
Qty: 84 CAP | Refills: 0 | Status: SHIPPED | OUTPATIENT
Start: 2018-09-07 | End: 2018-10-30

## 2018-09-07 RX ADMIN — ASPIRIN 81 MG: 81 TABLET ORAL at 09:35

## 2018-09-07 RX ADMIN — VITAMIN D 1000 UNITS: 25 TAB ORAL at 09:35

## 2018-09-07 RX ADMIN — Medication 400 MG: at 09:35

## 2018-09-07 RX ADMIN — GABAPENTIN 200 MG: 100 CAPSULE ORAL at 06:09

## 2018-09-07 RX ADMIN — CLOPIDOGREL BISULFATE 75 MG: 75 TABLET ORAL at 09:35

## 2018-09-07 RX ADMIN — INSULIN GLARGINE 15 UNITS: 100 INJECTION, SOLUTION SUBCUTANEOUS at 09:17

## 2018-09-07 RX ADMIN — FLUOXETINE HYDROCHLORIDE 20 MG: 20 CAPSULE ORAL at 09:35

## 2018-09-07 NOTE — BH NOTES
At random times throughout the night, patient noted to be sleeping, however, yelling out. At these times, patient would be awoken from his sleep. Patient would be slightly disoriented upon being woken up, but would soon become A&Ox4. When asked if he was okay, patient responded \"Why? \" Patient was told he was yelling in his sleep. At that time, patient stated \"Oh, I have nightmares. \" Will pass on to day shift nurse to discuss in treatment team.

## 2018-09-07 NOTE — PROGRESS NOTES
General Daily Progress Note Admit Date: 9/4/2018 Subjective:  
 
Patient is seen again for cough/vomiting up sputum. He has been spitting up a lot of sputum per report of his nurse. Current Facility-Administered Medications Medication Dose Route Frequency  glucose chewable tablet 16 g  4 Tab Oral PRN  
 dextrose (D50W) injection syrg 12.5-25 g  25-50 mL IntraVENous PRN  
 glucagon (GLUCAGEN) injection 1 mg  1 mg IntraMUSCular PRN  
 OLANZapine (ZyPREXA) tablet 2.5 mg  2.5 mg Oral Q6H PRN  
 ziprasidone (GEODON) 10 mg in sterile water (preservative free) 0.5 mL injection  10 mg IntraMUSCular BID PRN  
 benztropine (COGENTIN) tablet 1 mg  1 mg Oral BID PRN  
 benztropine (COGENTIN) injection 1 mg  1 mg IntraMUSCular BID PRN  
 acetaminophen (TYLENOL) tablet 650 mg  650 mg Oral Q4H PRN  
 ibuprofen (MOTRIN) tablet 400 mg  400 mg Oral Q8H PRN  
 magnesium hydroxide (MILK OF MAGNESIA) 400 mg/5 mL oral suspension 30 mL  30 mL Oral DAILY PRN  
 nicotine (NICODERM CQ) 21 mg/24 hr patch 1 Patch  1 Patch TransDERmal DAILY PRN  
 aspirin delayed-release tablet 81 mg  81 mg Oral DAILY  cholecalciferol (VITAMIN D3) tablet 1,000 Units  1,000 Units Oral DAILY  insulin glargine (LANTUS) injection 15 Units  15 Units SubCUTAneous DAILY  insulin glargine (LANTUS) injection 10 Units  10 Units SubCUTAneous QHS  FLUoxetine (PROzac) capsule 20 mg  20 mg Oral DAILY  atorvastatin (LIPITOR) tablet 10 mg  10 mg Oral QHS  clopidogrel (PLAVIX) tablet 75 mg  75 mg Oral DAILY  magnesium oxide (MAG-OX) tablet 400 mg  400 mg Oral DAILY  traZODone (DESYREL) tablet 50 mg  50 mg Oral QHS PRN  
 gabapentin (NEURONTIN) capsule 200 mg  200 mg Oral TID Objective:  
 
No data found. Physical Exam:  
Visit Vitals  /72 (BP 1 Location: Left arm, BP Patient Position: At rest)  Pulse 62  Temp 97.7 °F (36.5 °C)  Resp 16  
 Ht 5' 7\" (1.702 m)  Wt 59 kg (130 lb 1.1 oz)  SpO2 97%  BMI 20.37 kg/m2 General:  Alert, cooperative, no distress, appears stated age. Head:  Normocephalic, without obvious abnormality, atraumatic. Eyes:  Conjunctivae/corneas clear. PERRL, EOMs intact. Lungs:   Clear to auscultation bilaterally anteriorly Chest wall:  No tenderness or deformity. Heart:  Regular rate and rhythm, S1, S2 normal, no murmur, click, rub or gallop. Abdomen:   Soft, non-tender. Bowel sounds normal. No masses,  No organomegaly. Extremities: Extremities normal, atraumatic, no cyanosis or edema. Pulses:   
Skin: Skin color, texture, turgor normal. No rashes or lesions Recent Results (from the past 24 hour(s)) GLUCOSE, POC Collection Time: 09/06/18  8:32 AM  
Result Value Ref Range Glucose (POC) 74 65 - 100 mg/dL Performed by Anish Cherry GLUCOSE, POC Collection Time: 09/06/18 12:11 PM  
Result Value Ref Range Glucose (POC) 80 65 - 100 mg/dL Performed by Anish Cherry GLUCOSE, POC Collection Time: 09/06/18  8:54 PM  
Result Value Ref Range Glucose (POC) 216 (H) 65 - 100 mg/dL Performed by Natty Wren Assessment:  
 
Principal Problem: 
  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 
  Depressive disorder (9/4/2018) Excessive saliva Plan:  
 
Cont to monitor Consider scopolamine patch

## 2018-09-07 NOTE — BH NOTES
Patient had no complaints throughout the night, remained in room, monitored q 15minutes for safety, and slept for 6 hours. No labs. Morning medications taken without issue. Will continue to monitor for remainder of shift for changes/issues/complaints.

## 2018-09-07 NOTE — DISCHARGE INSTRUCTIONS
DISCHARGE SUMMARY from Nurse    PATIENT INSTRUCTIONS:    After general anesthesia or intravenous sedation, for 24 hours or while taking prescription Narcotics:  · Limit your activities  · Do not drive and operate hazardous machinery  · Do not make important personal or business decisions  · Do  not drink alcoholic beverages  · If you have not urinated within 8 hours after discharge, please contact your surgeon on call. Report the following to your surgeon:  · Excessive pain, swelling, redness or odor of or around the surgical area  · Temperature over 100.5  · Nausea and vomiting lasting longer than 4 hours or if unable to take medications  · Any signs of decreased circulation or nerve impairment to extremity: change in color, persistent  numbness, tingling, coldness or increase pain  · Any questions    *  Please give a list of your current medications to your Primary Care Provider. *  Please update this list whenever your medications are discontinued, doses are      changed, or new medications (including over-the-counter products) are added. *  Please carry medication information at all times in case of emergency situations. These are general instructions for a healthy lifestyle:    No smoking/ No tobacco products/ Avoid exposure to second hand smoke  Surgeon General's Warning:  Quitting smoking now greatly reduces serious risk to your health. Obesity, smoking, and sedentary lifestyle greatly increases your risk for illness    A healthy diet, regular physical exercise & weight monitoring are important for maintaining a healthy lifestyle    You may be retaining fluid if you have a history of heart failure or if you experience any of the following symptoms:  Weight gain of 3 pounds or more overnight or 5 pounds in a week, increased swelling in our hands or feet or shortness of breath while lying flat in bed.   Please call your doctor as soon as you notice any of these symptoms; do not wait until your next office visit. Recognize signs and symptoms of STROKE:    F-face looks uneven    A-arms unable to move or move unevenly    S-speech slurred or non-existent    T-time-call 911 as soon as signs and symptoms begin-DO NOT go       Back to bed or wait to see if you get better-TIME IS BRAIN. Warning Signs of HEART ATTACK     Call 911 if you have these symptoms:   Chest discomfort. Most heart attacks involve discomfort in the center of the chest that lasts more than a few minutes, or that goes away and comes back. It can feel like uncomfortable pressure, squeezing, fullness, or pain.  Discomfort in other areas of the upper body. Symptoms can include pain or discomfort in one or both arms, the back, neck, jaw, or stomach.  Shortness of breath with or without chest discomfort.  Other signs may include breaking out in a cold sweat, nausea, or lightheadedness. Don't wait more than five minutes to call 911 - MINUTES MATTER! Fast action can save your life. Calling 911 is almost always the fastest way to get lifesaving treatment. Emergency Medical Services staff can begin treatment when they arrive -- up to an hour sooner than if someone gets to the hospital by car. If I feel that I can not keep these promises and I am at risk of hurting myself or others, I will call the crisis office and speak with a crisis worker who will assist me during my crisis. [facility]                [address] [facility]            [phone]    The discharge information has been reviewed with the patient. The patient verbalized understanding.   Discharge medications reviewed with the patient and appropriate educational materials and side effects teaching were provided.   ___________________________________________________________________________________________________________________________________

## 2018-09-07 NOTE — BH NOTES
Behavioral Health Transition Record to Provider Patient Name: Eligio Medina YOB: 1932 Medical Record Number: [id number] Date of Admission: 9/4/2018 Date of Discharge: 9/7/18 Attending Provider: Margaret Cm MD 
Discharging Provider: Dr. Danya Kaplan To contact this individual call [phone] and ask the  to page. If unavailable, ask to be transferred to [facility] Provider on call. [facility] Provider will be available on call 24/7 and during holidays. Primary Care Provider: Lorena Reddy MD 
 
No Known Allergies Reason for Admission: suicidal statement Admission Diagnosis: Depressive disorder * No surgery found * Results for orders placed or performed during the hospital encounter of 09/04/18 ETHYL ALCOHOL Result Value Ref Range ALCOHOL(ETHYL),SERUM <10 <10 MG/DL  
CBC WITH AUTOMATED DIFF Result Value Ref Range WBC 4.8 4.1 - 11.1 K/uL  
 RBC 3.64 (L) 4.10 - 5.70 M/uL  
 HGB 11.3 (L) 12.1 - 17.0 g/dL HCT 32.5 (L) 36.6 - 50.3 % MCV 89.3 80.0 - 99.0 FL  
 MCH 31.0 26.0 - 34.0 PG  
 MCHC 34.8 30.0 - 36.5 g/dL  
 RDW 11.9 11.5 - 14.5 % PLATELET 938 518 - 269 K/uL MPV 9.2 8.9 - 12.9 FL  
 NRBC 0.0 0  WBC ABSOLUTE NRBC 0.00 0.00 - 0.01 K/uL NEUTROPHILS 47 32 - 75 % LYMPHOCYTES 45 12 - 49 % MONOCYTES 7 5 - 13 % EOSINOPHILS 1 0 - 7 % BASOPHILS 1 0 - 1 % IMMATURE GRANULOCYTES 0 0.0 - 0.5 % ABS. NEUTROPHILS 2.2 1.8 - 8.0 K/UL  
 ABS. LYMPHOCYTES 2.2 0.8 - 3.5 K/UL  
 ABS. MONOCYTES 0.3 0.0 - 1.0 K/UL  
 ABS. EOSINOPHILS 0.0 0.0 - 0.4 K/UL  
 ABS. BASOPHILS 0.0 0.0 - 0.1 K/UL  
 ABS. IMM. GRANS. 0.0 0.00 - 0.04 K/UL  
 DF AUTOMATED METABOLIC PANEL, COMPREHENSIVE Result Value Ref Range Sodium 134 (L) 136 - 145 mmol/L Potassium 3.8 3.5 - 5.1 mmol/L Chloride 104 97 - 108 mmol/L  
 CO2 25 21 - 32 mmol/L Anion gap 5 5 - 15 mmol/L Glucose 119 (H) 65 - 100 mg/dL BUN 15 6 - 20 MG/DL Creatinine 1.77 (H) 0.70 - 1.30 MG/DL  
 BUN/Creatinine ratio 8 (L) 12 - 20 GFR est AA 45 (L) >60 ml/min/1.73m2 GFR est non-AA 37 (L) >60 ml/min/1.73m2 Calcium 8.9 8.5 - 10.1 MG/DL Bilirubin, total 0.7 0.2 - 1.0 MG/DL  
 ALT (SGPT) 21 12 - 78 U/L  
 AST (SGOT) 30 15 - 37 U/L Alk. phosphatase 82 45 - 117 U/L Protein, total 7.1 6.4 - 8.2 g/dL Albumin 3.5 3.5 - 5.0 g/dL Globulin 3.6 2.0 - 4.0 g/dL A-G Ratio 1.0 (L) 1.1 - 2.2 URINALYSIS W/ REFLEX CULTURE Result Value Ref Range Color YELLOW/STRAW Appearance CLEAR CLEAR Specific gravity 1.020 1.003 - 1.030    
 pH (UA) 5.0 5.0 - 8.0 Protein NEGATIVE  NEG mg/dL Glucose NEGATIVE  NEG mg/dL Ketone TRACE (A) NEG mg/dL Blood NEGATIVE  NEG Urobilinogen 1.0 0.2 - 1.0 EU/dL Nitrites NEGATIVE  NEG Leukocyte Esterase SMALL (A) NEG    
 WBC 0-4 0 - 4 /hpf  
 RBC 0-5 0 - 5 /hpf Epithelial cells FEW FEW /lpf Bacteria NEGATIVE  NEG /hpf  
 UA:UC IF INDICATED CULTURE NOT INDICATED BY UA RESULT CNI    
DRUG SCREEN, URINE Result Value Ref Range AMPHETAMINES NEGATIVE  NEG    
 BARBITURATES POSITIVE (A) NEG BENZODIAZEPINES NEGATIVE  NEG    
 COCAINE NEGATIVE  NEG METHADONE NEGATIVE  NEG    
 OPIATES NEGATIVE  NEG    
 PCP(PHENCYCLIDINE) NEGATIVE  NEG    
 THC (TH-CANNABINOL) NEGATIVE  NEG Drug screen comment (NOTE) BILIRUBIN, CONFIRM Result Value Ref Range Bilirubin UA, confirm NEGATIVE  NEG    
GLUCOSE, POC Result Value Ref Range Glucose (POC) 127 (H) 65 - 100 mg/dL Performed by Pete Elizondo GLUCOSE, POC Result Value Ref Range Glucose (POC) 275 (H) 65 - 100 mg/dL Performed by Renny Salinas GLUCOSE, POC Result Value Ref Range Glucose (POC) 202 (H) 65 - 100 mg/dL Performed by Renny Salinas GLUCOSE, POC Result Value Ref Range Glucose (POC) 74 65 - 100 mg/dL Performed by Valerie Pabon GLUCOSE, POC  
 Result Value Ref Range Glucose (POC) 80 65 - 100 mg/dL Performed by Eleanor Sow GLUCOSE, POC Result Value Ref Range Glucose (POC) 216 (H) 65 - 100 mg/dL Performed by Sanchez Estes GLUCOSE, POC Result Value Ref Range Glucose (POC) 100 65 - 100 mg/dL Performed by Wagner York Immunizations administered during this encounter:  
Immunization History Administered Date(s) Administered  Pneumococcal Polysaccharide (PPSV-23) 07/26/2014 Screening for Metabolic Disorders for Patients on Antipsychotic Medications 
(Data obtained from the EMR) Estimated Body Mass Index Estimated body mass index is 20.37 kg/(m^2) as calculated from the following: 
  Height as of this encounter: 5' 7\" (1.702 m). Weight as of this encounter: 59 kg (130 lb 1.1 oz). Vital Signs/Blood Pressure Visit Vitals  /72 (BP 1 Location: Left arm, BP Patient Position: At rest)  Pulse 62  Temp 97.7 °F (36.5 °C)  Resp 16  
 Ht 5' 7\" (1.702 m)  Wt 59 kg (130 lb 1.1 oz)  SpO2 97%  BMI 20.37 kg/m2 Blood Glucose/Hemoglobin A1c Lab Results Component Value Date/Time Glucose 119 (H) 09/04/2018 02:50 PM  
 Glucose (POC) 100 09/07/2018 08:09 AM  
 
 
Lab Results Component Value Date/Time Hemoglobin A1c 8.4 (H) 07/25/2014 04:23 AM  
 
  
Lipid Panel Lab Results Component Value Date/Time Cholesterol, total 133 07/25/2014 04:23 AM  
 HDL Cholesterol 40 07/25/2014 04:23 AM  
 LDL, calculated 55.6 07/25/2014 04:23 AM  
 Triglyceride 187 (H) 07/25/2014 04:23 AM  
 CHOL/HDL Ratio 3.3 07/25/2014 04:23 AM  
 
  
Discharge Diagnosis: Opioid-induced depressive disorder Discharge Plan: The patient appears to be at baseline, he was in a good mod, had a congruent affect and was discharged due to exhibiting no signs/symptoms of SI/HI, psychosis or other mental health issues. The patient's girlfriend and her daughter transported him home.   He will follow up with PCP Monday and [facility] home health will follow up with the patient for medication management. Continuing care paperwork was faxed. Discharge Medication List and Instructions:  
Current Discharge Medication List  
  
 
 
Unresulted Labs None To obtain results of studies pending at discharge, please contact N/A Follow-up Information Follow up With Details Comments Contact Info Kingsley Vann MD  Please follow up with Dr. Virginie Dominguez on Monday, 9/10/18 at [address] [place] [address] [phone] 
[phone] [facility] [address] will contact you tonight to schedule when they will see you on Saturday for an initial assesment for home health nursing [address] Phone: [phone] Fax: [phone] Advanced Directive:  
Does the patient have an appointed surrogate decision maker? No 
Does the patient have a Medical Advance Directive? No 
Does the patient have a Psychiatric Advance Directive? No 
If the patient does not have a surrogate or Medical Advance Directive AND Psychiatric Advance Directive, the patient was offered information on these advance directives Other No 
 
Patient Instructions: Please continue all medications until otherwise directed by physician. Tobacco Cessation Discharge Plan:  
Is the patient a smoker and needs referral for smoking cessation? No 
Patient referred to the following for smoking cessation with an appointment? No   
Patient was offered medication to assist with smoking cessation at discharge? No 
Was education for smoking cessation added to the discharge instructions? No 
 
Alcohol/Substance Abuse Discharge Plan:  
Does the patient have a history of substance/alcohol abuse and requires a referral for treatment? No 
Patient referred to the following for substance/alcohol abuse treatment with an appointment?  No 
 Patient was offered medication to assist with alcohol cessation at discharge? No 
Was education for substance/alcohol abuse added to discharge instructions? No 
 
Patient discharged to Home; discussed with patient/caregiver
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
## 2018-10-30 ENCOUNTER — HOSPITAL ENCOUNTER (EMERGENCY)
Age: 83
Discharge: HOME | End: 2018-10-30
Attending: EMERGENCY MEDICINE | Admitting: EMERGENCY MEDICINE
Payer: MEDICARE

## 2018-10-30 ENCOUNTER — APPOINTMENT (OUTPATIENT)
Dept: ULTRASOUND IMAGING | Age: 83
End: 2018-10-30
Attending: PHYSICIAN ASSISTANT
Payer: MEDICARE

## 2018-10-30 ENCOUNTER — APPOINTMENT (OUTPATIENT)
Dept: GENERAL RADIOLOGY | Age: 83
End: 2018-10-30
Attending: PHYSICIAN ASSISTANT
Payer: MEDICARE

## 2018-10-30 VITALS
RESPIRATION RATE: 16 BRPM | HEART RATE: 77 BPM | SYSTOLIC BLOOD PRESSURE: 116 MMHG | TEMPERATURE: 98 F | DIASTOLIC BLOOD PRESSURE: 66 MMHG | OXYGEN SATURATION: 100 % | HEIGHT: 66 IN | BODY MASS INDEX: 21.89 KG/M2 | WEIGHT: 136.2 LBS

## 2018-10-30 DIAGNOSIS — L03.116 CELLULITIS OF LEFT LOWER EXTREMITY: Primary | ICD-10-CM

## 2018-10-30 LAB
ANION GAP SERPL CALC-SCNC: 5 MMOL/L (ref 5–15)
BASOPHILS # BLD: 0 K/UL (ref 0–0.1)
BASOPHILS NFR BLD: 1 % (ref 0–1)
BUN SERPL-MCNC: 11 MG/DL (ref 6–20)
BUN/CREAT SERPL: 7 (ref 12–20)
CALCIUM SERPL-MCNC: 8.4 MG/DL (ref 8.5–10.1)
CHLORIDE SERPL-SCNC: 101 MMOL/L (ref 97–108)
CO2 SERPL-SCNC: 30 MMOL/L (ref 21–32)
CREAT SERPL-MCNC: 1.64 MG/DL (ref 0.7–1.3)
DIFFERENTIAL METHOD BLD: ABNORMAL
EOSINOPHIL # BLD: 0.2 K/UL (ref 0–0.4)
EOSINOPHIL NFR BLD: 4 % (ref 0–7)
ERYTHROCYTE [DISTWIDTH] IN BLOOD BY AUTOMATED COUNT: 11.7 % (ref 11.5–14.5)
GLUCOSE SERPL-MCNC: 242 MG/DL (ref 65–100)
HCT VFR BLD AUTO: 31.6 % (ref 36.6–50.3)
HGB BLD-MCNC: 10.7 G/DL (ref 12.1–17)
IMM GRANULOCYTES # BLD: 0 K/UL (ref 0–0.04)
IMM GRANULOCYTES NFR BLD AUTO: 0 % (ref 0–0.5)
LYMPHOCYTES # BLD: 1.1 K/UL (ref 0.8–3.5)
LYMPHOCYTES NFR BLD: 23 % (ref 12–49)
MCH RBC QN AUTO: 31.2 PG (ref 26–34)
MCHC RBC AUTO-ENTMCNC: 33.9 G/DL (ref 30–36.5)
MCV RBC AUTO: 92.1 FL (ref 80–99)
MONOCYTES # BLD: 0.5 K/UL (ref 0–1)
MONOCYTES NFR BLD: 11 % (ref 5–13)
NEUTS SEG # BLD: 2.8 K/UL (ref 1.8–8)
NEUTS SEG NFR BLD: 62 % (ref 32–75)
NRBC # BLD: 0 K/UL (ref 0–0.01)
NRBC BLD-RTO: 0 PER 100 WBC
PLATELET # BLD AUTO: 172 K/UL (ref 150–400)
PMV BLD AUTO: 9.5 FL (ref 8.9–12.9)
POTASSIUM SERPL-SCNC: 4.4 MMOL/L (ref 3.5–5.1)
RBC # BLD AUTO: 3.43 M/UL (ref 4.1–5.7)
SODIUM SERPL-SCNC: 136 MMOL/L (ref 136–145)
WBC # BLD AUTO: 4.6 K/UL (ref 4.1–11.1)

## 2018-10-30 PROCEDURE — 74011250637 HC RX REV CODE- 250/637: Performed by: PHYSICIAN ASSISTANT

## 2018-10-30 PROCEDURE — 80048 BASIC METABOLIC PNL TOTAL CA: CPT | Performed by: PHYSICIAN ASSISTANT

## 2018-10-30 PROCEDURE — 76882 US LMTD JT/FCL EVL NVASC XTR: CPT

## 2018-10-30 PROCEDURE — 99283 EMERGENCY DEPT VISIT LOW MDM: CPT

## 2018-10-30 PROCEDURE — 73630 X-RAY EXAM OF FOOT: CPT

## 2018-10-30 PROCEDURE — 85025 COMPLETE CBC W/AUTO DIFF WBC: CPT | Performed by: PHYSICIAN ASSISTANT

## 2018-10-30 PROCEDURE — 36415 COLL VENOUS BLD VENIPUNCTURE: CPT | Performed by: PHYSICIAN ASSISTANT

## 2018-10-30 RX ORDER — CEPHALEXIN 500 MG/1
500 CAPSULE ORAL 3 TIMES DAILY
Qty: 30 CAP | Refills: 0 | Status: SHIPPED | OUTPATIENT
Start: 2018-10-30 | End: 2018-11-09

## 2018-10-30 RX ORDER — CEPHALEXIN 250 MG/1
500 CAPSULE ORAL
Status: COMPLETED | OUTPATIENT
Start: 2018-10-30 | End: 2018-10-30

## 2018-10-30 RX ORDER — COLCHICINE 0.6 MG/1
1.2 TABLET ORAL
Status: DISCONTINUED | OUTPATIENT
Start: 2018-10-30 | End: 2018-10-30

## 2018-10-30 RX ORDER — HYDROCODONE BITARTRATE AND ACETAMINOPHEN 5; 325 MG/1; MG/1
1 TABLET ORAL
Qty: 8 TAB | Refills: 0 | Status: SHIPPED | OUTPATIENT
Start: 2018-10-30 | End: 2019-08-17

## 2018-10-30 RX ORDER — HYDROCODONE BITARTRATE AND ACETAMINOPHEN 5; 325 MG/1; MG/1
1 TABLET ORAL
Status: COMPLETED | OUTPATIENT
Start: 2018-10-30 | End: 2018-10-30

## 2018-10-30 RX ADMIN — HYDROCODONE BITARTRATE AND ACETAMINOPHEN 1 TABLET: 5; 325 TABLET ORAL at 18:04

## 2018-10-30 RX ADMIN — CEPHALEXIN 500 MG: 250 CAPSULE ORAL at 18:04

## 2018-10-30 NOTE — ED PROVIDER NOTES
EMERGENCY DEPARTMENT HISTORY AND PHYSICAL EXAM 
 
Date: 10/30/2018 Patient Name: Siena Membreno History of Presenting Illness Chief Complaint Patient presents with  Foot Pain History Provided By: Patient, Patient's Wife and Patient's Daughter HPI: Siena Membreno is a 80 y.o. male with a PMH of diabetes and hypertension who presents with cc of left foot pain, swelling for 1 week. Pt states he believes it to be gout as he has had gout in that foot before. According to wife and pt however, it has been a long time since he has had a gout attack (years) and his doctor took him off medication for it. Pt denies any change in diet or Trauma to the foot. Pt has self medicated and states it only hurts when he walks on his foot. He specifically denies any fevers, chills, nausea, abdominal pain, numbness/tingling, vomiting, chest pain, shortness of breath, headache, rash, diarrhea, sweating or weight loss. All other ROS negative at this time Pt is in no acute distress and is speaking in full sentences PCP: Shana Enriquez MD 
 
Current Outpatient Medications Medication Sig Dispense Refill  SITagliptin (JANUVIA) 100 mg tablet Take 100 mg by mouth daily.  cephALEXin (KEFLEX) 500 mg capsule Take 1 Cap by mouth three (3) times daily for 10 days. 30 Cap 0  
 HYDROcodone-acetaminophen (NORCO) 5-325 mg per tablet Take 1 Tab by mouth every four (4) hours as needed for Pain. Max Daily Amount: 6 Tabs. 8 Tab 0  
 traZODone (DESYREL) 50 mg tablet Take 1 Tab by mouth nightly as needed for Sleep. Indications: insomnia associated with depression 14 Tab 0  
 FLUoxetine (PROZAC) 20 mg capsule Take 20 mg by mouth daily. Indications: major depressive disorder Past History Past Medical History: 
Past Medical History:  
Diagnosis Date  Diabetes ([facility] [address]  Gastrointestinal disorder  Hypertension  Other ill-defined conditions(029.88)   
 gout, glaucoma  Other ill-defined conditions(799.89)   
 high cholesterol Past Surgical History: 
Past Surgical History:  
Procedure Laterality Date  HX ORTHOPAEDIC    
 cervical surgery Family History: 
History reviewed. No pertinent family history. Social History: 
Social History Tobacco Use  Smoking status: Never Smoker  Smokeless tobacco: Never Used Substance Use Topics  Alcohol use: No  
  Comment: quit  Drug use: No  
 
 
Allergies: 
No Known Allergies Review of Systems Review of Systems Constitutional: Negative. Negative for chills and fever. HENT: Negative. Eyes: Negative. Respiratory: Negative. Negative for shortness of breath. Cardiovascular: Negative. Negative for chest pain. Gastrointestinal: Negative. Negative for abdominal pain, diarrhea, nausea and vomiting. Endocrine: Negative. Genitourinary: Negative. Musculoskeletal: Positive for arthralgias. Skin: Positive for color change. Allergic/Immunologic: Negative. Neurological: Negative. Negative for headaches. Hematological: Negative. Psychiatric/Behavioral: Negative. All other systems reviewed and are negative. Physical Exam  
 
Vitals:  
 10/30/18 1536 10/30/18 1720 BP: 102/59 116/66 Pulse: 77 Resp: 16 16 Temp: 98 °F (36.7 °C) SpO2: 100% Weight: 61.8 kg (136 lb 3.2 oz) Height: 5' 6\" (1.676 m) Physical Exam  
Constitutional: He is oriented to person, place, and time. He appears well-developed and well-nourished. No distress. HENT:  
Head: Normocephalic and atraumatic. Right Ear: External ear normal.  
Left Ear: External ear normal.  
Nose: Nose normal.  
Mouth/Throat: Oropharynx is clear and moist.  
Eyes: Conjunctivae and EOM are normal. Pupils are equal, round, and reactive to light. Neck: Normal range of motion. Neck supple. Cardiovascular: Normal rate, regular rhythm, normal heart sounds and intact distal pulses. Pulmonary/Chest: Effort normal and breath sounds normal. No respiratory distress. He has no wheezes. He has no rales. Abdominal: Soft. Bowel sounds are normal. He exhibits no distension. There is no tenderness. There is no rebound, no guarding, no CVA tenderness, no tenderness at McBurney's point and negative Herring's sign. Musculoskeletal: Normal range of motion. He exhibits edema and tenderness. Feet: 
 
Lymphadenopathy:  
  He has no cervical adenopathy. Neurological: He is alert and oriented to person, place, and time. He has normal reflexes. He displays normal reflexes. No cranial nerve deficit. Coordination normal.  
Skin: No rash noted. He is not diaphoretic. Psychiatric: He has a normal mood and affect. His behavior is normal. Judgment and thought content normal.  
Nursing note and vitals reviewed. Diagnostic Study Results Labs - Recent Results (from the past 12 hour(s)) CBC WITH AUTOMATED DIFF Collection Time: 10/30/18  4:26 PM  
Result Value Ref Range WBC 4.6 4.1 - 11.1 K/uL  
 RBC 3.43 (L) 4.10 - 5.70 M/uL  
 HGB 10.7 (L) 12.1 - 17.0 g/dL HCT 31.6 (L) 36.6 - 50.3 % MCV 92.1 80.0 - 99.0 FL  
 MCH 31.2 26.0 - 34.0 PG  
 MCHC 33.9 30.0 - 36.5 g/dL  
 RDW 11.7 11.5 - 14.5 % PLATELET 558 420 - 168 K/uL MPV 9.5 8.9 - 12.9 FL  
 NRBC 0.0 0  WBC ABSOLUTE NRBC 0.00 0.00 - 0.01 K/uL NEUTROPHILS 62 32 - 75 % LYMPHOCYTES 23 12 - 49 % MONOCYTES 11 5 - 13 % EOSINOPHILS 4 0 - 7 % BASOPHILS 1 0 - 1 % IMMATURE GRANULOCYTES 0 0.0 - 0.5 % ABS. NEUTROPHILS 2.8 1.8 - 8.0 K/UL  
 ABS. LYMPHOCYTES 1.1 0.8 - 3.5 K/UL  
 ABS. MONOCYTES 0.5 0.0 - 1.0 K/UL  
 ABS. EOSINOPHILS 0.2 0.0 - 0.4 K/UL  
 ABS. BASOPHILS 0.0 0.0 - 0.1 K/UL  
 ABS. IMM. GRANS. 0.0 0.00 - 0.04 K/UL  
 DF AUTOMATED METABOLIC PANEL, BASIC Collection Time: 10/30/18  4:26 PM  
Result Value Ref Range Sodium 136 136 - 145 mmol/L  Potassium 4.4 3.5 - 5.1 mmol/L  
 Chloride 101 97 - 108 mmol/L  
 CO2 30 21 - 32 mmol/L Anion gap 5 5 - 15 mmol/L Glucose 242 (H) 65 - 100 mg/dL BUN 11 6 - 20 MG/DL Creatinine 1.64 (H) 0.70 - 1.30 MG/DL  
 BUN/Creatinine ratio 7 (L) 12 - 20 GFR est AA 49 (L) >60 ml/min/1.73m2 GFR est non-AA 40 (L) >60 ml/min/1.73m2 Calcium 8.4 (L) 8.5 - 10.1 MG/DL Radiologic Studies -  
US EXT NONVAS LT LTD Final Result XR FOOT LT MIN 3 V Final Result CT Results  (Last 48 hours) None CXR Results  (Last 48 hours) None Medical Decision Making I am the first provider for this patient. I reviewed the vital signs, available nursing notes, past medical history, past surgical history, family history and social history. Vital Signs-Reviewed the patient's vital signs. Records Reviewed: Nursing Notes, Old Medical Records, Previous Radiology Studies and Previous Laboratory Studies Disposition: 
Discharge DISCHARGE NOTE:  
Care plan outlined and precautions discussed. Patient has no new complaints, changes, or physical findings. Results of visit were reviewed with the patient. All medications were reviewed with the patient; will d/c home. All of pt's questions and concerns were addressed. Patient was instructed and agrees to follow up with pcp, as well as to return to the ED upon further deterioration. Patient is ready to go home. Follow-up Information Follow up With Specialties Details Why Contact Info Mal Irizarry MD Internal Medicine Schedule an appointment as soon as possible for a visit in 3 days If symptoms worsen [address] [place] [address] [id number] [phone] Discharge Medication List as of 10/30/2018  5:59 PM  
  
START taking these medications Details  
cephALEXin (KEFLEX) 500 mg capsule Take 1 Cap by mouth three (3) times daily for 10 days. , Normal, Disp-30 Cap, R-0  
  
 HYDROcodone-acetaminophen (NORCO) 5-325 mg per tablet Take 1 Tab by mouth every four (4) hours as needed for Pain. Max Daily Amount: 6 Tabs., Print, Disp-8 Tab, R-0  
  
  
CONTINUE these medications which have NOT CHANGED Details SITagliptin (JANUVIA) 100 mg tablet Take 100 mg by mouth daily. , Historical Med  
  
traZODone (DESYREL) 50 mg tablet Take 1 Tab by mouth nightly as needed for Sleep. Indications: insomnia associated with depression, Print, Disp-14 Tab, R-0  
  
FLUoxetine (PROZAC) 20 mg capsule Take 20 mg by mouth daily. Indications: major depressive disorder, Historical Med  
  
  
 
 
Provider Notes (Medical Decision Making): Will get blood work, ultrasound (to rule out abscess), Advise pt to f/u with pcp. Worsening si/sxs discussed extensively Follow up with PCP or RTC if symptoms/signs worsen Side effects of medication discussed Education materials provided at discharge Pt verbalizes agreement with plan 
 
Procedures: 
Procedures Diagnosis Clinical Impression: 1. Cellulitis of left lower extremity

## 2018-10-30 NOTE — DISCHARGE INSTRUCTIONS

## 2018-10-30 NOTE — ED NOTES
Pt denies pain at this time, reports the left foot only hurts when walking on it or when something touches it.

## 2018-10-30 NOTE — ED NOTES
Pt with hx of gout presents to ED with complaints of 10/10 left upper foot pain x1 week. Reports tenderness when palpated. Pt denies taking any medications for pain PTA. Pt is alert, oriented and cooperative. Swelling noted on the upper left foot. Skin is warm to touch. Pedal pulses present bilaterally. Pt ambulatory upon arrival.  
 
Emergency Department Nursing Plan of Care The Nursing Plan of Care is developed from the Nursing assessment and Emergency Department Attending provider initial evaluation. The plan of care may be reviewed in the ED Provider note. The Plan of Care was developed with the following considerations:  
Patient / Family readiness to learn indicated by:verbalized understanding Persons(s) to be included in education: patient Barriers to Learning/Limitations:No 
 
Signed Malika Cole 10/30/2018   4:19 PM

## 2018-12-19 ENCOUNTER — HOSPITAL ENCOUNTER (OUTPATIENT)
Dept: VASCULAR SURGERY | Age: 83
Discharge: HOME | End: 2018-12-19
Attending: PODIATRIST
Payer: MEDICARE

## 2018-12-19 DIAGNOSIS — M79.672 LEFT FOOT PAIN: ICD-10-CM

## 2018-12-19 PROCEDURE — 93922 UPR/L XTREMITY ART 2 LEVELS: CPT

## 2018-12-19 NOTE — PROCEDURES
[facility]  *** FINAL REPORT ***    Name: Jimenez Junior  MRN: [id number]    Outpatient  : 06 Oct 1932  HIS Order #: [id number]  [address] [facility] Visit #: [id number]  Date: 19 Dec 2018    TYPE OF TEST: Peripheral Arterial Testing    REASON FOR TEST  Claudication (both sides), Rest pain (both sides), Cold sensitivity    Right Leg  Segmentals: Abnormal                     mmHg  Brachial         106  High thigh  Low thigh  Calf  Posterior tibial   0  Dorsalis pedis  Peroneal  Metatarsal  Toe pressure  Doppler:    Normal  PVR:  Ankle/Brachial: 0.00    Left Leg  Segmentals: Noncompressible                     mmHg  Brachial          98  High thigh  Low thigh  Calf  Posterior tibial 142  Dorsalis pedis     0  Peroneal  Metatarsal  Toe pressure  Doppler:    Normal  PVR:  Ankle/Brachial: 1.34    INTERPRETATION/FINDINGS  1. Abnormal results suggestive of significant peripheral arterial  disease at rest in the right leg. 2. The left ankle/brachial index is 1.34.  3. Segmental pressures in the left leg are not measurable due to  extensive arterial calcification. The ankle/brachial index is  therefore an unreliable predictor of distal arterial perfusion. ADDITIONAL COMMENTS    I have personally reviewed the data relevant to the interpretation of  this  study. TECHNOLOGIST: Altagracia Enriquez  Signed: 2018 12:31 PM    PHYSICIAN: Joan Oseguera.  Rogers Jimenez MD  Signed: 2018 02:30 PM

## 2019-08-17 ENCOUNTER — HOSPITAL ENCOUNTER (EMERGENCY)
Age: 84
Discharge: HOME | End: 2019-08-17
Attending: EMERGENCY MEDICINE
Payer: MEDICARE

## 2019-08-17 ENCOUNTER — APPOINTMENT (OUTPATIENT)
Dept: GENERAL RADIOLOGY | Age: 84
End: 2019-08-17
Attending: PHYSICIAN ASSISTANT
Payer: MEDICARE

## 2019-08-17 VITALS
OXYGEN SATURATION: 100 % | DIASTOLIC BLOOD PRESSURE: 84 MMHG | SYSTOLIC BLOOD PRESSURE: 130 MMHG | WEIGHT: 152 LBS | HEART RATE: 74 BPM | RESPIRATION RATE: 18 BRPM | BODY MASS INDEX: 24.53 KG/M2 | TEMPERATURE: 97.8 F
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
## 2019-08-17 DIAGNOSIS — S73.101A HIP SPRAIN, RIGHT, INITIAL ENCOUNTER: ICD-10-CM

## 2019-08-17 DIAGNOSIS — S80.01XA CONTUSION OF RIGHT KNEE, INITIAL ENCOUNTER: ICD-10-CM

## 2019-08-17 DIAGNOSIS — S70.01XA CONTUSION OF RIGHT HIP, INITIAL ENCOUNTER: Primary | ICD-10-CM

## 2019-08-17 PROCEDURE — 74011250637 HC RX REV CODE- 250/637: Performed by: PHYSICIAN ASSISTANT

## 2019-08-17 PROCEDURE — 73562 X-RAY EXAM OF KNEE 3: CPT

## 2019-08-17 PROCEDURE — 99283 EMERGENCY DEPT VISIT LOW MDM: CPT

## 2019-08-17 PROCEDURE — 73502 X-RAY EXAM HIP UNI 2-3 VIEWS: CPT

## 2019-08-17 RX ORDER — TRAMADOL HYDROCHLORIDE 50 MG/1
TABLET ORAL
Refills: 3 | COMMUNITY
Start: 2019-06-15 | End: 2019-08-17

## 2019-08-17 RX ORDER — FLASH GLUCOSE SENSOR
KIT MISCELLANEOUS
Refills: 12 | COMMUNITY
Start: 2019-07-22

## 2019-08-17 RX ORDER — ALLOPURINOL 100 MG/1
TABLET ORAL
Refills: 7 | COMMUNITY
Start: 2019-08-09

## 2019-08-17 RX ORDER — TRAMADOL HYDROCHLORIDE 50 MG/1
100 TABLET ORAL
Status: COMPLETED | OUTPATIENT
Start: 2019-08-17 | End: 2019-08-17

## 2019-08-17 RX ORDER — LANOLIN ALCOHOL/MO/W.PET/CERES
1000 CREAM (GRAM) TOPICAL DAILY
COMMUNITY

## 2019-08-17 RX ORDER — PREGABALIN 50 MG/1
CAPSULE ORAL
Refills: 5 | COMMUNITY
Start: 2019-06-27

## 2019-08-17 RX ORDER — BIMATOPROST 0.1 MG/ML
SOLUTION/ DROPS OPHTHALMIC
Refills: 3 | COMMUNITY
Start: 2019-07-20

## 2019-08-17 RX ORDER — HYDROCODONE BITARTRATE AND ACETAMINOPHEN 5; 325 MG/1; MG/1
1 TABLET ORAL
Qty: 5 TAB | Refills: 0 | Status: SHIPPED | OUTPATIENT
Start: 2019-08-17 | End: 2019-08-20

## 2019-08-17 RX ADMIN — TRAMADOL HYDROCHLORIDE 100 MG: 50 TABLET, FILM COATED ORAL at 12:41

## 2019-08-17 NOTE — ED NOTES
Patient (s)  given copy of dc instructions and 1 paper script(s) and 0 electronic scripts. Patient (s)  verbalized understanding of instructions and script (s). Patient given a current medication reconciliation form and verbalized understanding of their medications. Patient (s) verbalized understanding of the importance of discussing medications with  his or her physician or clinic they will be following up with. Patient alert and oriented and in no acute distress. Patient offered wheelchair from treatment area to hospital entrance, patient declines wheelchair, patient walking out with family and rolling walker.

## 2019-08-17 NOTE — ED PROVIDER NOTES
EMERGENCY DEPARTMENT HISTORY AND PHYSICAL EXAM      Date: 8/17/2019  Patient Name: Lore Dukes    History of Presenting Illness     HPI: Lore Dukes is a 80 y.o. male with past medical history of diabetes, hypertension, gout, glaucoma, hyperlipidemia, presents to the emergency room for a ground-level fall that happened yesterday. He says that he was walking with his walker when his right foot hit the wheel on his walker and he fell onto his right side. He reports his pain is progressively worsened to 9 out of 10, positional, achy pain that is primarily in his right hip but also in his right knee. He denies inability to bear weight, headache, nausea vomiting, neck pain back pain, being on blood thinners, among other associated symptoms and history. Pertinent social history: None    Pertinent surgical history: Cervical neck surgery    PCP: Eduardo El MD    Current Outpatient Medications   Medication Sig Dispense Refill    LYRICA 50 mg capsule TAKE 1 CAPSULE BY MOUTH TWICE DAILY  5    allopurinol (ZYLOPRIM) 100 mg tablet TAKE 1 TABLET BY MOUTH ONCE DAILY  7    cyanocobalamin (VITAMIN B-12) 1,000 mcg tablet Take 1,000 mcg by mouth daily.  HYDROcodone-acetaminophen (NORCO) 5-325 mg per tablet Take 1 Tab by mouth every eight (8) hours as needed for Pain for up to 3 days. Max Daily Amount: 3 Tabs. 5 Tab 0    SITagliptin (JANUVIA) 100 mg tablet Take 100 mg by mouth daily.  traZODone (DESYREL) 50 mg tablet Take 1 Tab by mouth nightly as needed for Sleep.  Indications: insomnia associated with depression 14 Tab 0    FREESTYLE LAVINIA 14 DAY SENSOR kit USE AS DIRECTED  12    LUMIGAN 0.01 % ophthalmic drops INSTILL 1 DROP INTO EACH EYE ONCE DAILY AT BEDTIME  3       Past History     Past Medical History:  Past Medical History:   Diagnosis Date    Diabetes ([address]     Gastrointestinal disorder     Hypertension     Other ill-defined conditions(720.53)     gout, glaucoma    Other ill-defined conditions(799.89)     high cholesterol       Past Surgical History:  Past Surgical History:   Procedure Laterality Date    HX ORTHOPAEDIC      cervical surgery       Family History:  History reviewed. No pertinent family history. Social History:  Social History     Tobacco Use    Smoking status: Never Smoker    Smokeless tobacco: Never Used   Substance Use Topics    Alcohol use: No     Comment: quit    Drug use: No       Allergies: Allergies   Allergen Reactions    Codeine Itching         Review of Systems   Review of Systems   Constitutional: Negative for chills and fever. HENT: Negative for congestion and sore throat. Eyes: Negative for discharge and redness. Respiratory: Negative for shortness of breath and wheezing. Cardiovascular: Negative for chest pain. Gastrointestinal: Negative for abdominal pain, nausea and vomiting. Endocrine: Negative for polydipsia, polyphagia and polyuria. Genitourinary: Negative for frequency and urgency. Musculoskeletal: Positive for arthralgias. Negative for back pain, myalgias and neck pain. Skin: Negative for rash and wound. Neurological: Negative. Negative for syncope, light-headedness and headaches. Physical Exam     Vitals:    08/17/19 1210 08/17/19 1300 08/17/19 1302 08/17/19 1400   BP:  108/58  130/84   Pulse:       Resp:       Temp:       SpO2: 100%  100% 100%   Weight:         Physical Exam   Constitutional: He is oriented to person, place, and time. He appears well-developed and well-nourished. HENT:   Head: Normocephalic and atraumatic. Cardiovascular: Normal rate, regular rhythm and normal heart sounds.    Pulmonary/Chest: Effort normal and breath sounds normal.   Musculoskeletal:   No tenderness to palpation or bruising to right posterior ribs or lumbar spine    Right hip and knee exam: Full range of motion in both hip and knee, right leg does not appear to be shortened or internally/externally rotated when compared to left leg, the patient is able to bear weight, antalgic gait, 2+ distal pulses, neurovascularly intact, no obvious signs of trauma   Neurological: He is alert and oriented to person, place, and time. Skin: Skin is warm and dry. Psychiatric: He has a normal mood and affect. His behavior is normal. Judgment and thought content normal.   Nursing note and vitals reviewed. Diagnostic Study Results     Labs -   No results found for this or any previous visit (from the past 12 hour(s)). Radiologic Studies -   XR KNEE RT 3 V   Final Result   IMPRESSION: No acute abnormality. XR HIP RT W OR WO PELV 2-3 VWS   Final Result   IMPRESSION: No acute abnormality. CT Results  (Last 48 hours)    None        CXR Results  (Last 48 hours)    None            Medical Decision Making   I am the first provider for this patient. I reviewed the vital signs, available nursing notes, past medical history, past surgical history, social history      Pulse Oximetry Analysis - 100% on RA    ED Course and Progress notes:   Initial assessment performed. The patients presenting problems have been discussed, and they are in agreement with the care plan formulated and outlined with them. I have encouraged them to ask questions as they arise throughout their visit. On re evaluation pt is resting comfortably, and has no new complaints, changes, or physical findings. The patient has improved and is stable. Procedures:  Procedures    Critical Care Time: none    Vital Signs-Reviewed the patient's vital signs.   Vitals:    08/17/19 1210 08/17/19 1300 08/17/19 1302 08/17/19 1400   BP:  108/58  130/84   BP 1 Location:       BP Patient Position:       Pulse:       Resp:       Temp:       SpO2: 100%  100% 100%   Weight:           Medications Administered During ED Course  Medications   traMADol (ULTRAM) tablet 100 mg (100 mg Oral Given 8/17/19 1241)         Disposition:  D/c home    DISCHARGE NOTE:   I Counseled the patient on diagnosis and care plan. All available lab and imaging results have been reviewed by me and were discussed with the patient, including all incidental findings. The likelihood of other entities in the differential is insufficient to justify any further testing for them. This was explained to the patient. Patient agrees with plan and agrees to follow up with PCP as recommended, or return to the ED immediately if their symptoms worsen. All medications were reviewed with the patient. All of pt's questions and concerns were addressed. The patient was advised that new or worsening symptoms would require further evaluation and should prompt immediate return to the Emergency Department. Discharge instructions have been provided and explained to the patient, along with reasons to return to the ED. Patient voices understanding and is agreeable with the plan for discharge. Patient is ready to go home. Follow-up Information     Follow up With Specialties Details Why Contact Info    Freddy Go MD Internal Medicine Schedule an appointment as soon as possible for a visit For above diagnosis [address] [place] [address]  [phone]      [facility] EMERGENCY DEPT Emergency Medicine Go to If symptoms worsen [address]    [facility]  Schedule an appointment as soon as possible for a visit As needed [facility]  [address] [facility] [address]  [address]   [address] [facility]          Discharge Medication List as of 8/17/2019  1:32 PM      START taking these medications    Details   HYDROcodone-acetaminophen (NORCO) 5-325 mg per tablet Take 1 Tab by mouth every eight (8) hours as needed for Pain for up to 3 days.  Max Daily Amount: 3 Tabs., Print, Disp-5 Tab, R-0         CONTINUE these medications which have NOT CHANGED    Details   LYRICA 50 mg capsule TAKE 1 CAPSULE BY MOUTH TWICE DAILY, Historical Med, R-5, MEL      allopurinol (ZYLOPRIM) 100 mg tablet TAKE 1 TABLET BY MOUTH ONCE DAILY, Historical Med, R-7      cyanocobalamin (VITAMIN B-12) 1,000 mcg tablet Take 1,000 mcg by mouth daily. , Historical Med      SITagliptin (JANUVIA) 100 mg tablet Take 100 mg by mouth daily. , Historical Med      traZODone (DESYREL) 50 mg tablet Take 1 Tab by mouth nightly as needed for Sleep. Indications: insomnia associated with depression, Print, Disp-14 Tab, R-0      [facility] LAVINIA 14 DAY SENSOR kit USE AS DIRECTED, Historical Med, R-12, MEL      LUMIGAN 0.01 % ophthalmic drops INSTILL 1 DROP INTO EACH EYE ONCE DAILY AT BEDTIME, Historical Med, R-3, MEL         STOP taking these medications       traMADol (ULTRAM) 50 mg tablet Comments:   Reason for Stopping:               Provider Notes (Medical Decision Making):   Differential diagnosis: Fracture, contusion, sprain      Diagnosis     Clinical Impression:   1. Contusion of right hip, initial encounter    2. Contusion of right knee, initial encounter    3. Hip sprain, right, initial encounter    4. Fall, initial encounter        Please note that this dictation was completed with [employer], the [employer] voice recognition software. Quite often unanticipated grammatical, syntax, homophones, and other interpretive errors are inadvertently transcribed by the computer software. Please disregard these errors. Please excuse any errors that have escaped final proofreading. This note will not be viewable in [address]

## 2019-08-17 NOTE — ED NOTES
Patient here with c/o fall, with pain to right hip and right leg. Patient states that he was walking with his walker when the walker bumped up against a stool. Patient states that he hit the stool when he fell. Patient denies LOC with fall. Patient family at bedside. Emergency Department Nursing Plan of Care       The Nursing Plan of Care is developed from the Nursing assessment and Emergency Department Attending provider initial evaluation. The plan of care may be reviewed in the ED Provider note.     The Plan of Care was developed with the following considerations:   Patient / Family readiness to learn indicated by:verbalized understanding  Persons(s) to be included in education: patient  Barriers to Learning/Limitations:No    Signed     Chrsitian Vegas RN    8/17/2019   12:28 PM

## 2019-08-17 NOTE — ED NOTES
Patient states he is here today with c/o right side rib, shoulder,hip and neck pain that he states started after he had a ground level fall.

## 2019-08-17 NOTE — ED NOTES
Emergency Department Nursing Plan of Care       The Nursing Plan of Care is developed from the Nursing assessment and Emergency Department Attending provider initial evaluation. The plan of care may be reviewed in the ED Provider note.     The Plan of Care was developed with the following considerations:   Patient / Family readiness to learn indicated by:verbalized understanding  Persons(s) to be included in education: patient  Barriers to Learning/Limitations:No    Signed     Bassam Le RN    8/17/2019   12:26 PM

## 2019-08-17 NOTE — DISCHARGE INSTRUCTIONS
Patient Education        Contusion: Care Instructions  Your Care Instructions    Contusion is the medical term for a bruise. It is the result of a direct blow or an impact, such as a fall. Contusions are common sports injuries. Most people think of a bruise as a black-and-blue spot. This happens when small blood vessels get torn and leak blood under the skin. But bones, muscles, and organs can also get bruised. This may damage deep tissues but not cause a bruise you can see. The doctor will do a physical exam to find the location of your contusion. You may also have tests to make sure you do not have a more serious injury, such as a broken bone or nerve damage. These may include X-rays or other imaging tests like a CT scan or MRI. Deep-tissue contusions may cause pain and swelling. But if there is no serious damage, they will often get better in a few weeks with home treatment. The doctor has checked you carefully, but problems can develop later. If you notice any problems or new symptoms, get medical treatment right away. Follow-up care is a key part of your treatment and safety. Be sure to make and go to all appointments, and call your doctor if you are having problems. It's also a good idea to know your test results and keep a list of the medicines you take. How can you care for yourself at home? · Put ice or a cold pack on the sore area for 10 to 20 minutes at a time to stop swelling. Put a thin cloth between the ice pack and your skin. · Be safe with medicines. Read and follow all instructions on the label. ? If the doctor gave you a prescription medicine for pain, take it as prescribed. ? If you are not taking a prescription pain medicine, ask your doctor if you can take an over-the-counter medicine. · If you can, prop up the sore area on pillows as much as possible for the next few days. Try to keep the sore area above the level of your heart. When should you call for help?   Call your doctor now or seek immediate medical care if:    · Your pain gets worse.     · You have new or worse swelling.     · You have tingling, weakness, or numbness in the area near the contusion.     · The area near the contusion is cold or pale.    Watch closely for changes in your health, and be sure to contact your doctor if:    · You do not get better as expected. Where can you learn more? Go to http://maranda-willy.info/. Enter K310 in the search box to learn more about \"Contusion: Care Instructions. \"  Current as of: September 23, 2018  Content Version: 12.1  © 9742-7369 [employer]. Care instructions adapted under license by [employer] (which disclaims liability or warranty for this information). If you have questions about a medical condition or this instruction, always ask your healthcare professional. [address] any warranty or liability for your use of this information. Patient Education        Preventing Falls: Care Instructions  Your Care Instructions    Getting around your home safely can be a challenge if you have injuries or health problems that make it easy for you to fall. Loose rugs and furniture in walkways are among the dangers for many older people who have problems walking or who have poor eyesight. People who have conditions such as arthritis, osteoporosis, or dementia also have to be careful not to fall. You can make your home safer with a few simple measures. Follow-up care is a key part of your treatment and safety. Be sure to make and go to all appointments, and call your doctor if you are having problems. It's also a good idea to know your test results and keep a list of the medicines you take. How can you care for yourself at home? Taking care of yourself  · You may get dizzy if you do not drink enough water. To prevent dehydration, drink plenty of fluids, enough so that your urine is light yellow or clear like water. Choose water and other caffeine-free clear liquids. If you have kidney, heart, or liver disease and have to limit fluids, talk with your doctor before you increase the amount of fluids you drink. · Exercise regularly to improve your strength, muscle tone, and balance. Walk if you can. Swimming may be a good choice if you cannot walk easily. · Have your vision and hearing checked each year or any time you notice a change. If you have trouble seeing and hearing, you might not be able to avoid objects and could lose your balance. · Know the side effects of the medicines you take. Ask your doctor or pharmacist whether the medicines you take can affect your balance. Sleeping pills or sedatives can affect your balance. · Limit the amount of alcohol you drink. Alcohol can impair your balance and other senses. · Ask your doctor whether calluses or corns on your feet need to be removed. If you wear loose-fitting shoes because of calluses or corns, you can lose your balance and fall. · Talk to your doctor if you have numbness in your feet. Preventing falls at home  · Remove raised doorway thresholds, throw rugs, and clutter. Repair loose carpet or raised areas in the floor. · Move furniture and electrical cords to keep them out of walking paths. · Use nonskid floor wax, and wipe up spills right away, especially on ceramic tile floors. · If you use a walker or cane, put rubber tips on it. If you use crutches, clean the bottoms of them regularly with an abrasive pad, such as steel wool. · Keep your house well lit, especially [address], and outside walkways. Use night-lights in areas such as hallways and bathrooms. Add extra light switches or use remote switches (such as switches that go on or off when you clap your hands) to make it easier to turn lights on if you have to get up during the night. · Install sturdy handrails on stairways.   · Move items in your cabinets so that the things you use a lot are on the lower shelves (about waist level). · Keep a cordless phone and a flashlight with new batteries by your bed. If possible, put a phone in each of the main rooms of your house, or carry a cell phone in case you fall and cannot reach a phone. Or, you can wear a device around your neck or wrist. You push a button that sends a signal for help. · Wear low-heeled shoes that fit well and give your feet good support. Use footwear with nonskid soles. Check the heels and soles of your shoes for wear. Repair or replace worn heels or soles. · Do not wear socks without shoes on wood floors. · Walk on the grass when the sidewalks are slippery. If you live in an area that gets snow and ice in the winter, sprinkle salt on slippery steps and sidewalks. Preventing falls in the bath  · Install grab bars and nonskid mats inside and outside your shower or tub and near the toilet and sinks. · Use shower chairs and bath benches. · Use a hand-held shower head that will allow you to sit while showering. · Get into a tub or shower by putting the weaker leg in first. Get out of a tub or shower with your strong side first.  · Repair loose toilet seats and consider installing a raised toilet seat to make getting on and off the toilet easier. · Keep your bathroom door unlocked while you are in the shower. Where can you learn more? Go to http://maranda-willy.info/. Enter [id number] [id number] in the search box to learn more about \"Preventing Falls: Care Instructions. \"  Current as of: March 16, 2018  Content Version: 11.8  © 6431-1166 [employer]. Care instructions adapted under license by [employer] (which disclaims liability or warranty for this information). If you have questions about a medical condition or this instruction, always ask your healthcare professional. [address]  any warranty or liability for your use of this information.          Patient Education        Hip Sprain: Care Instructions  Your Care Instructions    A hip sprain occurs when you stretch or tear ligaments around your hip. Ligaments are tough tissues that connect one bone to another. You can injure your hip in a fall, when you run, or during sports that involve twisting or sudden direction changes, such as basketball or soccer. Most minor hip sprains get better with treatment at home. Follow-up care is a key part of your treatment and safety. Be sure to make and go to all appointments, and call your doctor if you are having problems. It's also a good idea to know your test results and keep a list of the medicines you take. How can you care for yourself at home? · If your doctor gave you crutches or a walker, use them as directed. · Rest and protect your hip. Try to stop or reduce any action that causes pain. · Put ice or a cold pack on your hip for 10 to 20 minutes at a time. Try to do this every 1 to 2 hours for the next 3 days (when you are awake) or until the swelling goes down. Put a thin cloth between the ice and your skin. · Be safe with medicines. Read and follow all instructions on the label. ? If the doctor gave you a prescription medicine for pain, take it as prescribed. ? If you are not taking a prescription pain medicine, ask your doctor if you can take an over-the-counter medicine. · For the first day or two after an injury, avoid things that might increase swelling, such as hot showers, hot tubs, or hot packs. · After 2 to 3 days, if swelling is gone, put a heating pad (set on low) or warm moist cloth on your hip before you do light stretches. The warmth will help you move your hip. · Do exercises to make your hip stronger, as directed by your doctor or physical therapist.  · Return to your usual level of activity slowly. When should you call for help?   Call your doctor now or seek immediate medical care if:    · Your pain is worse.     · You cannot walk or stand without help.     · You have signs of infection, such as a fever or increased pain, swelling, redness, or warmth in your hip.     · You have signs of a blood clot, such as:  ? Pain in your calf, back of the knee, thigh, or groin. ? Redness and swelling in your leg or groin.     · You have tingling, weakness, or numbness in your leg, foot, or toes.    Watch closely for changes in your health, and be sure to contact your doctor if:    · Your pain does not get better in 2 or 3 days.     · You still have pain after 2 weeks. Where can you learn more? Go to http://maranda-willy.info/. Enter J041 in the search box to learn more about \"Hip Sprain: Care Instructions. \"  Current as of: September 20, 2018  Content Version: 12.1  © 3757-8548 [employer]. Care instructions adapted under license by [employer] (which disclaims liability or warranty for this information). If you have questions about a medical condition or this instruction, always ask your healthcare professional. [address] any warranty or liability for your use of this information.

## 2021-03-05 ENCOUNTER — APPOINTMENT (OUTPATIENT)
Dept: GENERAL RADIOLOGY | Age: 86
DRG: 481 | End: 2021-03-05
Attending: EMERGENCY MEDICINE
Payer: MEDICARE

## 2021-03-05 ENCOUNTER — APPOINTMENT (OUTPATIENT)
Dept: ULTRASOUND IMAGING | Age: 86
DRG: 481 | End: 2021-03-05
Attending: STUDENT IN AN ORGANIZED HEALTH CARE EDUCATION/TRAINING PROGRAM
Payer: MEDICARE

## 2021-03-05 ENCOUNTER — APPOINTMENT (OUTPATIENT)
Dept: CT IMAGING | Age: 86
DRG: 481 | End: 2021-03-05
Attending: EMERGENCY MEDICINE
Payer: MEDICARE

## 2021-03-05 ENCOUNTER — HOSPITAL ENCOUNTER (INPATIENT)
Age: 86
LOS: 7 days | Discharge: NURSING, REHAB OR LONG-TERM CARE | DRG: 481 | End: 2021-03-12
Attending: EMERGENCY MEDICINE | Admitting: STUDENT IN AN ORGANIZED HEALTH CARE EDUCATION/TRAINING PROGRAM
Payer: MEDICARE

## 2021-03-05 DIAGNOSIS — S09.90XA CLOSED HEAD INJURY, INITIAL ENCOUNTER: ICD-10-CM

## 2021-03-05 DIAGNOSIS — R56.9 SEIZURE (HCC): ICD-10-CM

## 2021-03-05 DIAGNOSIS — R41.0 DELIRIUM: ICD-10-CM

## 2021-03-05 DIAGNOSIS — N17.9 ACUTE RENAL FAILURE, UNSPECIFIED ACUTE RENAL FAILURE TYPE (HCC): ICD-10-CM

## 2021-03-05 DIAGNOSIS — N17.0 ACUTE RENAL FAILURE WITH TUBULAR NECROSIS (HCC): ICD-10-CM

## 2021-03-05 DIAGNOSIS — S72.001A CLOSED FRACTURE OF RIGHT HIP, INITIAL ENCOUNTER (HCC): Primary | ICD-10-CM

## 2021-03-05 PROBLEM — S72.009A HIP FRACTURE (HCC): Status: ACTIVE | Noted: 2021-03-05

## 2021-03-05 PROBLEM — E87.5 HYPERKALEMIA: Status: ACTIVE | Noted: 2021-03-05

## 2021-03-05 LAB
ABO + RH BLD: NORMAL
ALBUMIN SERPL-MCNC: 3.6 G/DL (ref 3.5–5)
ALBUMIN/GLOB SERPL: 0.9 {RATIO} (ref 1.1–2.2)
ALP SERPL-CCNC: 160 U/L (ref 45–117)
ALT SERPL-CCNC: 24 U/L (ref 12–78)
ANION GAP SERPL CALC-SCNC: 3 MMOL/L (ref 5–15)
APPEARANCE UR: CLEAR
AST SERPL-CCNC: 29 U/L (ref 15–37)
BACTERIA URNS QL MICRO: NEGATIVE /HPF
BASOPHILS # BLD: 0 K/UL (ref 0–0.1)
BASOPHILS NFR BLD: 1 % (ref 0–1)
BILIRUB SERPL-MCNC: 0.4 MG/DL (ref 0.2–1)
BILIRUB UR QL: NEGATIVE
BLOOD GROUP ANTIBODIES SERPL: NORMAL
BUN SERPL-MCNC: 26 MG/DL (ref 6–20)
BUN/CREAT SERPL: 9 (ref 12–20)
CALCIUM SERPL-MCNC: 8.9 MG/DL (ref 8.5–10.1)
CHLORIDE SERPL-SCNC: 104 MMOL/L (ref 97–108)
CO2 SERPL-SCNC: 27 MMOL/L (ref 21–32)
COLOR UR: ABNORMAL
CREAT SERPL-MCNC: 2.83 MG/DL (ref 0.7–1.3)
DIFFERENTIAL METHOD BLD: ABNORMAL
EOSINOPHIL # BLD: 0.2 K/UL (ref 0–0.4)
EOSINOPHIL NFR BLD: 4 % (ref 0–7)
EPITH CASTS URNS QL MICRO: ABNORMAL /LPF
ERYTHROCYTE [DISTWIDTH] IN BLOOD BY AUTOMATED COUNT: 12.8 % (ref 11.5–14.5)
GLOBULIN SER CALC-MCNC: 4.1 G/DL (ref 2–4)
GLUCOSE BLD STRIP.AUTO-MCNC: 128 MG/DL (ref 65–100)
GLUCOSE SERPL-MCNC: 84 MG/DL (ref 65–100)
GLUCOSE UR STRIP.AUTO-MCNC: 500 MG/DL
HCT VFR BLD AUTO: 35.4 % (ref 36.6–50.3)
HGB BLD-MCNC: 11.7 G/DL (ref 12.1–17)
HGB UR QL STRIP: ABNORMAL
HYALINE CASTS URNS QL MICRO: ABNORMAL /LPF (ref 0–5)
IMM GRANULOCYTES # BLD AUTO: 0 K/UL (ref 0–0.04)
IMM GRANULOCYTES NFR BLD AUTO: 0 % (ref 0–0.5)
INR PPP: 1.1 (ref 0.9–1.1)
KETONES UR QL STRIP.AUTO: NEGATIVE MG/DL
LEUKOCYTE ESTERASE UR QL STRIP.AUTO: NEGATIVE
LYMPHOCYTES # BLD: 2 K/UL (ref 0.8–3.5)
LYMPHOCYTES NFR BLD: 36 % (ref 12–49)
MCH RBC QN AUTO: 30.8 PG (ref 26–34)
MCHC RBC AUTO-ENTMCNC: 33.1 G/DL (ref 30–36.5)
MCV RBC AUTO: 93.2 FL (ref 80–99)
MONOCYTES # BLD: 0.6 K/UL (ref 0–1)
MONOCYTES NFR BLD: 11 % (ref 5–13)
NEUTS SEG # BLD: 2.6 K/UL (ref 1.8–8)
NEUTS SEG NFR BLD: 48 % (ref 32–75)
NITRITE UR QL STRIP.AUTO: NEGATIVE
NRBC # BLD: 0 K/UL (ref 0–0.01)
NRBC BLD-RTO: 0 PER 100 WBC
PH UR STRIP: 6.5 [PH] (ref 5–8)
PLATELET # BLD AUTO: 175 K/UL (ref 150–400)
PMV BLD AUTO: 9.8 FL (ref 8.9–12.9)
POTASSIUM SERPL-SCNC: 5.7 MMOL/L (ref 3.5–5.1)
PROT SERPL-MCNC: 7.7 G/DL (ref 6.4–8.2)
PROT UR STRIP-MCNC: NEGATIVE MG/DL
PROTHROMBIN TIME: 11.8 SEC (ref 9–11.1)
RBC # BLD AUTO: 3.8 M/UL (ref 4.1–5.7)
RBC #/AREA URNS HPF: ABNORMAL /HPF (ref 0–5)
SERVICE CMNT-IMP: ABNORMAL
SODIUM SERPL-SCNC: 134 MMOL/L (ref 136–145)
SP GR UR REFRACTOMETRY: 1.01 (ref 1–1.03)
SPECIMEN EXP DATE BLD: NORMAL
UA: UC IF INDICATED,UAUC: ABNORMAL
UROBILINOGEN UR QL STRIP.AUTO: 0.2 EU/DL (ref 0.2–1)
WBC # BLD AUTO: 5.4 K/UL (ref 4.1–11.1)
WBC URNS QL MICRO: ABNORMAL /HPF (ref 0–4)

## 2021-03-05 PROCEDURE — 51702 INSERT TEMP BLADDER CATH: CPT

## 2021-03-05 PROCEDURE — 96375 TX/PRO/DX INJ NEW DRUG ADDON: CPT

## 2021-03-05 PROCEDURE — 81001 URINALYSIS AUTO W/SCOPE: CPT

## 2021-03-05 PROCEDURE — 85025 COMPLETE CBC W/AUTO DIFF WBC: CPT

## 2021-03-05 PROCEDURE — 82962 GLUCOSE BLOOD TEST: CPT

## 2021-03-05 PROCEDURE — 99285 EMERGENCY DEPT VISIT HI MDM: CPT

## 2021-03-05 PROCEDURE — 65660000001 HC RM ICU INTERMED STEPDOWN

## 2021-03-05 PROCEDURE — 74011636637 HC RX REV CODE- 636/637: Performed by: STUDENT IN AN ORGANIZED HEALTH CARE EDUCATION/TRAINING PROGRAM

## 2021-03-05 PROCEDURE — 71045 X-RAY EXAM CHEST 1 VIEW: CPT

## 2021-03-05 PROCEDURE — 74011250636 HC RX REV CODE- 250/636: Performed by: STUDENT IN AN ORGANIZED HEALTH CARE EDUCATION/TRAINING PROGRAM

## 2021-03-05 PROCEDURE — 74011250637 HC RX REV CODE- 250/637: Performed by: NURSE PRACTITIONER

## 2021-03-05 PROCEDURE — 36415 COLL VENOUS BLD VENIPUNCTURE: CPT

## 2021-03-05 PROCEDURE — 73502 X-RAY EXAM HIP UNI 2-3 VIEWS: CPT

## 2021-03-05 PROCEDURE — 74011000250 HC RX REV CODE- 250: Performed by: STUDENT IN AN ORGANIZED HEALTH CARE EDUCATION/TRAINING PROGRAM

## 2021-03-05 PROCEDURE — 74011250637 HC RX REV CODE- 250/637: Performed by: STUDENT IN AN ORGANIZED HEALTH CARE EDUCATION/TRAINING PROGRAM

## 2021-03-05 PROCEDURE — 80053 COMPREHEN METABOLIC PANEL: CPT

## 2021-03-05 PROCEDURE — 86901 BLOOD TYPING SEROLOGIC RH(D): CPT

## 2021-03-05 PROCEDURE — 74011250636 HC RX REV CODE- 250/636: Performed by: NURSE PRACTITIONER

## 2021-03-05 PROCEDURE — 74011250636 HC RX REV CODE- 250/636

## 2021-03-05 PROCEDURE — 85610 PROTHROMBIN TIME: CPT

## 2021-03-05 PROCEDURE — 93005 ELECTROCARDIOGRAM TRACING: CPT

## 2021-03-05 PROCEDURE — 96374 THER/PROPH/DIAG INJ IV PUSH: CPT

## 2021-03-05 PROCEDURE — 74011000258 HC RX REV CODE- 258: Performed by: STUDENT IN AN ORGANIZED HEALTH CARE EDUCATION/TRAINING PROGRAM

## 2021-03-05 PROCEDURE — 95816 EEG AWAKE AND DROWSY: CPT | Performed by: ORTHOPAEDIC SURGERY

## 2021-03-05 PROCEDURE — 70450 CT HEAD/BRAIN W/O DYE: CPT

## 2021-03-05 PROCEDURE — 76770 US EXAM ABDO BACK WALL COMP: CPT

## 2021-03-05 PROCEDURE — 74011250636 HC RX REV CODE- 250/636: Performed by: EMERGENCY MEDICINE

## 2021-03-05 RX ORDER — CALCIUM GLUCONATE 94 MG/ML
1 INJECTION, SOLUTION INTRAVENOUS ONCE
Status: DISCONTINUED | OUTPATIENT
Start: 2021-03-05 | End: 2021-03-05

## 2021-03-05 RX ORDER — DIAZEPAM 5 MG/1
2.5 TABLET ORAL
Status: DISCONTINUED | OUTPATIENT
Start: 2021-03-05 | End: 2021-03-05

## 2021-03-05 RX ORDER — MIDAZOLAM HYDROCHLORIDE 1 MG/ML
INJECTION, SOLUTION INTRAMUSCULAR; INTRAVENOUS
Status: COMPLETED
Start: 2021-03-05 | End: 2021-03-05

## 2021-03-05 RX ORDER — SODIUM CHLORIDE 9 MG/ML
75 INJECTION, SOLUTION INTRAVENOUS CONTINUOUS
Status: DISCONTINUED | OUTPATIENT
Start: 2021-03-05 | End: 2021-03-05

## 2021-03-05 RX ORDER — DEXTROSE 50 % IN WATER (D50W) INTRAVENOUS SYRINGE
25 ONCE
Status: COMPLETED | OUTPATIENT
Start: 2021-03-05 | End: 2021-03-05

## 2021-03-05 RX ORDER — OXYCODONE HYDROCHLORIDE 5 MG/1
2.5 TABLET ORAL
Status: DISCONTINUED | OUTPATIENT
Start: 2021-03-05 | End: 2021-03-12 | Stop reason: HOSPADM

## 2021-03-05 RX ORDER — DEXTROSE 50 % IN WATER (D50W) INTRAVENOUS SYRINGE
12.5-25 AS NEEDED
Status: DISCONTINUED | OUTPATIENT
Start: 2021-03-05 | End: 2021-03-12 | Stop reason: HOSPADM

## 2021-03-05 RX ORDER — TRAZODONE HYDROCHLORIDE 50 MG/1
50 TABLET ORAL
Status: DISCONTINUED | OUTPATIENT
Start: 2021-03-05 | End: 2021-03-12 | Stop reason: HOSPADM

## 2021-03-05 RX ORDER — SODIUM CHLORIDE 0.9 % (FLUSH) 0.9 %
5-40 SYRINGE (ML) INJECTION EVERY 8 HOURS
Status: DISCONTINUED | OUTPATIENT
Start: 2021-03-05 | End: 2021-03-05 | Stop reason: SDUPTHER

## 2021-03-05 RX ORDER — NALOXONE HYDROCHLORIDE 1 MG/ML
2 INJECTION INTRAMUSCULAR; INTRAVENOUS; SUBCUTANEOUS
Status: COMPLETED | OUTPATIENT
Start: 2021-03-05 | End: 2021-03-05

## 2021-03-05 RX ORDER — ONDANSETRON 2 MG/ML
4 INJECTION INTRAMUSCULAR; INTRAVENOUS
Status: COMPLETED | OUTPATIENT
Start: 2021-03-05 | End: 2021-03-05

## 2021-03-05 RX ORDER — MORPHINE SULFATE 2 MG/ML
4 INJECTION, SOLUTION INTRAMUSCULAR; INTRAVENOUS
Status: COMPLETED | OUTPATIENT
Start: 2021-03-05 | End: 2021-03-05

## 2021-03-05 RX ORDER — ACETAMINOPHEN 325 MG/1
650 TABLET ORAL
Status: DISCONTINUED | OUTPATIENT
Start: 2021-03-05 | End: 2021-03-05 | Stop reason: SDUPTHER

## 2021-03-05 RX ORDER — ACETAMINOPHEN 650 MG/1
650 SUPPOSITORY RECTAL
Status: DISCONTINUED | OUTPATIENT
Start: 2021-03-05 | End: 2021-03-12 | Stop reason: HOSPADM

## 2021-03-05 RX ORDER — ONDANSETRON 2 MG/ML
4 INJECTION INTRAMUSCULAR; INTRAVENOUS
Status: DISCONTINUED | OUTPATIENT
Start: 2021-03-05 | End: 2021-03-07 | Stop reason: SDUPTHER

## 2021-03-05 RX ORDER — ONDANSETRON 2 MG/ML
4 INJECTION INTRAMUSCULAR; INTRAVENOUS
Status: DISCONTINUED | OUTPATIENT
Start: 2021-03-05 | End: 2021-03-05 | Stop reason: SDUPTHER

## 2021-03-05 RX ORDER — SODIUM POLYSTYRENE SULFONATE 15 G/60ML
30 SUSPENSION ORAL; RECTAL
Status: COMPLETED | OUTPATIENT
Start: 2021-03-05 | End: 2021-03-05

## 2021-03-05 RX ORDER — POLYETHYLENE GLYCOL 3350 17 G/17G
17 POWDER, FOR SOLUTION ORAL DAILY PRN
Status: DISCONTINUED | OUTPATIENT
Start: 2021-03-05 | End: 2021-03-12 | Stop reason: HOSPADM

## 2021-03-05 RX ORDER — MIDAZOLAM HYDROCHLORIDE 1 MG/ML
2 INJECTION, SOLUTION INTRAMUSCULAR; INTRAVENOUS ONCE
Status: COMPLETED | OUTPATIENT
Start: 2021-03-05 | End: 2021-03-05

## 2021-03-05 RX ORDER — LANOLIN ALCOHOL/MO/W.PET/CERES
1000 CREAM (GRAM) TOPICAL DAILY
Status: DISCONTINUED | OUTPATIENT
Start: 2021-03-06 | End: 2021-03-12 | Stop reason: HOSPADM

## 2021-03-05 RX ORDER — ACETAMINOPHEN 325 MG/1
650 TABLET ORAL EVERY 6 HOURS
Status: DISCONTINUED | OUTPATIENT
Start: 2021-03-05 | End: 2021-03-07 | Stop reason: SDUPTHER

## 2021-03-05 RX ORDER — FENTANYL CITRATE 50 UG/ML
100 INJECTION, SOLUTION INTRAMUSCULAR; INTRAVENOUS
Status: COMPLETED | OUTPATIENT
Start: 2021-03-05 | End: 2021-03-05

## 2021-03-05 RX ORDER — NALOXONE HYDROCHLORIDE 0.4 MG/ML
0.4 INJECTION, SOLUTION INTRAMUSCULAR; INTRAVENOUS; SUBCUTANEOUS AS NEEDED
Status: DISCONTINUED | OUTPATIENT
Start: 2021-03-05 | End: 2021-03-07 | Stop reason: SDUPTHER

## 2021-03-05 RX ORDER — NALOXONE HYDROCHLORIDE 1 MG/ML
INJECTION INTRAMUSCULAR; INTRAVENOUS; SUBCUTANEOUS
Status: DISPENSED
Start: 2021-03-05 | End: 2021-03-06

## 2021-03-05 RX ORDER — SODIUM CHLORIDE 0.9 % (FLUSH) 0.9 %
5-40 SYRINGE (ML) INJECTION AS NEEDED
Status: DISCONTINUED | OUTPATIENT
Start: 2021-03-05 | End: 2021-03-07 | Stop reason: SDUPTHER

## 2021-03-05 RX ORDER — LATANOPROST 50 UG/ML
1 SOLUTION/ DROPS OPHTHALMIC
Status: DISCONTINUED | OUTPATIENT
Start: 2021-03-06 | End: 2021-03-12 | Stop reason: HOSPADM

## 2021-03-05 RX ORDER — SODIUM CHLORIDE 0.9 % (FLUSH) 0.9 %
5-40 SYRINGE (ML) INJECTION AS NEEDED
Status: DISCONTINUED | OUTPATIENT
Start: 2021-03-05 | End: 2021-03-05 | Stop reason: SDUPTHER

## 2021-03-05 RX ORDER — MAGNESIUM SULFATE 100 %
4 CRYSTALS MISCELLANEOUS AS NEEDED
Status: DISCONTINUED | OUTPATIENT
Start: 2021-03-05 | End: 2021-03-12 | Stop reason: HOSPADM

## 2021-03-05 RX ORDER — ALLOPURINOL 100 MG/1
100 TABLET ORAL DAILY
Status: DISCONTINUED | OUTPATIENT
Start: 2021-03-06 | End: 2021-03-12 | Stop reason: HOSPADM

## 2021-03-05 RX ORDER — INSULIN LISPRO 100 [IU]/ML
INJECTION, SOLUTION INTRAVENOUS; SUBCUTANEOUS
Status: DISCONTINUED | OUTPATIENT
Start: 2021-03-05 | End: 2021-03-12 | Stop reason: HOSPADM

## 2021-03-05 RX ORDER — HEPARIN SODIUM 5000 [USP'U]/ML
5000 INJECTION, SOLUTION INTRAVENOUS; SUBCUTANEOUS EVERY 8 HOURS
Status: DISCONTINUED | OUTPATIENT
Start: 2021-03-05 | End: 2021-03-07

## 2021-03-05 RX ORDER — OXYCODONE HYDROCHLORIDE 5 MG/1
5 TABLET ORAL
Status: DISCONTINUED | OUTPATIENT
Start: 2021-03-05 | End: 2021-03-06

## 2021-03-05 RX ORDER — SODIUM CHLORIDE 9 MG/ML
100 INJECTION, SOLUTION INTRAVENOUS CONTINUOUS
Status: DISCONTINUED | OUTPATIENT
Start: 2021-03-05 | End: 2021-03-07 | Stop reason: SDUPTHER

## 2021-03-05 RX ORDER — AMOXICILLIN 250 MG
2 CAPSULE ORAL 2 TIMES DAILY
Status: DISCONTINUED | OUTPATIENT
Start: 2021-03-05 | End: 2021-03-07 | Stop reason: SDUPTHER

## 2021-03-05 RX ORDER — ACETAMINOPHEN 325 MG/1
650 TABLET ORAL
Status: DISCONTINUED | OUTPATIENT
Start: 2021-03-05 | End: 2021-03-12 | Stop reason: HOSPADM

## 2021-03-05 RX ORDER — PREGABALIN 25 MG/1
50 CAPSULE ORAL DAILY
Status: DISCONTINUED | OUTPATIENT
Start: 2021-03-06 | End: 2021-03-12 | Stop reason: HOSPADM

## 2021-03-05 RX ORDER — PROMETHAZINE HYDROCHLORIDE 25 MG/1
12.5 TABLET ORAL
Status: DISCONTINUED | OUTPATIENT
Start: 2021-03-05 | End: 2021-03-12 | Stop reason: HOSPADM

## 2021-03-05 RX ORDER — LEVETIRACETAM 5 MG/ML
500 INJECTION INTRAVASCULAR EVERY 12 HOURS
Status: DISCONTINUED | OUTPATIENT
Start: 2021-03-05 | End: 2021-03-06

## 2021-03-05 RX ORDER — SODIUM CHLORIDE 0.9 % (FLUSH) 0.9 %
5-40 SYRINGE (ML) INJECTION EVERY 8 HOURS
Status: DISCONTINUED | OUTPATIENT
Start: 2021-03-05 | End: 2021-03-07 | Stop reason: SDUPTHER

## 2021-03-05 RX ADMIN — MIDAZOLAM HYDROCHLORIDE 2 MG: 1 INJECTION, SOLUTION INTRAMUSCULAR; INTRAVENOUS at 18:43

## 2021-03-05 RX ADMIN — MORPHINE SULFATE 4 MG: 2 INJECTION, SOLUTION INTRAMUSCULAR; INTRAVENOUS at 16:47

## 2021-03-05 RX ADMIN — FENTANYL CITRATE 100 MCG: 50 INJECTION INTRAMUSCULAR; INTRAVENOUS at 18:36

## 2021-03-05 RX ADMIN — SODIUM CHLORIDE 75 ML/HR: 9 INJECTION, SOLUTION INTRAVENOUS at 19:57

## 2021-03-05 RX ADMIN — CALCIUM GLUCONATE 1 G: 98 INJECTION, SOLUTION INTRAVENOUS at 20:53

## 2021-03-05 RX ADMIN — SODIUM POLYSTYRENE SULFONATE 30 G: 15 SUSPENSION ORAL; RECTAL at 21:34

## 2021-03-05 RX ADMIN — ONDANSETRON 4 MG: 2 INJECTION INTRAMUSCULAR; INTRAVENOUS at 16:45

## 2021-03-05 RX ADMIN — DEXTROSE MONOHYDRATE 25 G: 25 INJECTION, SOLUTION INTRAVENOUS at 21:11

## 2021-03-05 RX ADMIN — SODIUM CHLORIDE 100 ML/HR: 9 INJECTION, SOLUTION INTRAVENOUS at 16:45

## 2021-03-05 RX ADMIN — ACETAMINOPHEN 650 MG: 325 TABLET ORAL at 19:53

## 2021-03-05 RX ADMIN — HEPARIN SODIUM 5000 UNITS: 5000 INJECTION INTRAVENOUS; SUBCUTANEOUS at 21:34

## 2021-03-05 RX ADMIN — LEVETIRACETAM 500 MG: 5 INJECTION INTRAVENOUS at 21:17

## 2021-03-05 RX ADMIN — NALOXONE HYDROCHLORIDE 2 MG: 1 INJECTION PARENTERAL at 18:45

## 2021-03-05 RX ADMIN — Medication 10 ML: at 19:59

## 2021-03-05 RX ADMIN — HUMAN INSULIN 5 UNITS: 100 INJECTION, SOLUTION SUBCUTANEOUS at 21:12

## 2021-03-05 RX ADMIN — DOCUSATE SODIUM - SENNOSIDES 2 TABLET: 50; 8.6 TABLET, FILM COATED ORAL at 21:19

## 2021-03-05 NOTE — ED NOTES
Pt is now relaxed, responding to RN appropriately. Family member with pt assisting with verbally calming pt.   Oxygen decreased to 3 liters NC

## 2021-03-05 NOTE — ED NOTES
Pt given Fentanyl for pain then turned slightly to L side. Just after pt had seizure like activity, would respond when name called but arms pulled in, head turned to full L and eyes deviated to L.  ER provider called to room and orders received.

## 2021-03-05 NOTE — ED NOTES
1843-Versed 2mg given per verbal order for seizure like activity. Room air pulse reading dropped to 75% quickly, oxygen 4 liters NC applied, pulse ox reading increased to 96%  1845- Narcan given per verbal order.

## 2021-03-05 NOTE — ED TRIAGE NOTES
Pt arrives via EMS from home post GLF, lost his balance. Pt c/o R hip pain but points to R groin area. Note external rotation and shortening of R leg.

## 2021-03-06 ENCOUNTER — APPOINTMENT (OUTPATIENT)
Dept: NON INVASIVE DIAGNOSTICS | Age: 86
DRG: 481 | End: 2021-03-06
Attending: STUDENT IN AN ORGANIZED HEALTH CARE EDUCATION/TRAINING PROGRAM
Payer: MEDICARE

## 2021-03-06 ENCOUNTER — ANESTHESIA (OUTPATIENT)
Dept: SURGERY | Age: 86
DRG: 481 | End: 2021-03-06
Payer: MEDICARE

## 2021-03-06 ENCOUNTER — ANESTHESIA EVENT (OUTPATIENT)
Dept: SURGERY | Age: 86
DRG: 481 | End: 2021-03-06
Payer: MEDICARE

## 2021-03-06 LAB
ANION GAP SERPL CALC-SCNC: 6 MMOL/L (ref 5–15)
ATRIAL RATE: 60 BPM
BASOPHILS # BLD: 0 K/UL (ref 0–0.1)
BASOPHILS NFR BLD: 0 % (ref 0–1)
BNP SERPL-MCNC: 719 PG/ML
BUN SERPL-MCNC: 26 MG/DL (ref 6–20)
BUN/CREAT SERPL: 10 (ref 12–20)
CALCIUM SERPL-MCNC: 8.4 MG/DL (ref 8.5–10.1)
CALCULATED P AXIS, ECG09: 45 DEGREES
CALCULATED R AXIS, ECG10: 7 DEGREES
CALCULATED T AXIS, ECG11: 21 DEGREES
CHLORIDE SERPL-SCNC: 105 MMOL/L (ref 97–108)
CO2 SERPL-SCNC: 21 MMOL/L (ref 21–32)
CREAT SERPL-MCNC: 2.62 MG/DL (ref 0.7–1.3)
DIAGNOSIS, 93000: NORMAL
DIFFERENTIAL METHOD BLD: ABNORMAL
ECHO AO ROOT DIAM: 2.8 CM
ECHO AV AREA PEAK VELOCITY: 3.11 CM2
ECHO AV AREA/BSA PEAK VELOCITY: 1.7 CM2/M2
ECHO AV PEAK GRADIENT: 4.69 MMHG
ECHO AV PEAK VELOCITY: 108.3 CM/S
ECHO EST RA PRESSURE: 3 MMHG
ECHO LA AREA 4C: 19.93 CM2
ECHO LA MAJOR AXIS: 2.97 CM
ECHO LA MINOR AXIS: 1.59 CM
ECHO LA VOL 2C: 82.64 ML (ref 18–58)
ECHO LA VOL 4C: 60.14 ML (ref 18–58)
ECHO LA VOL BP: 81.46 ML (ref 18–58)
ECHO LA VOL/BSA BIPLANE: 43.74 ML/M2 (ref 16–28)
ECHO LA VOLUME INDEX A2C: 44.37 ML/M2 (ref 16–28)
ECHO LA VOLUME INDEX A4C: 32.29 ML/M2 (ref 16–28)
ECHO LV E' LATERAL VELOCITY: 6.98 CM/S
ECHO LV E' SEPTAL VELOCITY: 3.37 CM/S
ECHO LV INTERNAL DIMENSION DIASTOLIC: 4.1 CM (ref 4.2–5.9)
ECHO LV INTERNAL DIMENSION SYSTOLIC: 2.76 CM
ECHO LV IVSD: 1.44 CM (ref 0.6–1)
ECHO LV IVSS: 2.1 CM
ECHO LV MASS 2D: 222.6 G (ref 88–224)
ECHO LV MASS INDEX 2D: 119.5 G/M2 (ref 49–115)
ECHO LV POSTERIOR WALL DIASTOLIC: 1.41 CM (ref 0.6–1)
ECHO LV POSTERIOR WALL SYSTOLIC: 1.95 CM
ECHO LVOT DIAM: 2.11 CM
ECHO LVOT PEAK GRADIENT: 3.73 MMHG
ECHO LVOT PEAK VELOCITY: 96.53 CM/S
ECHO MV A VELOCITY: 104.52 CM/S
ECHO MV E DECELERATION TIME (DT): 156.06 MS
ECHO MV E VELOCITY: 90.86 CM/S
ECHO MV E/A RATIO: 0.87
ECHO MV E/E' LATERAL: 13.02
ECHO MV E/E' RATIO (AVERAGED): 19.99
ECHO MV E/E' SEPTAL: 26.96
ECHO MV EROA PISA: 0.16 CM2
ECHO MV REGURGITANT RADIUS PISA: 0.6 CM
ECHO MV REGURGITANT VOLUME: 31.13 ML
ECHO MV REGURGITANT VTIA: 192.27 CM
ECHO PV MAX VELOCITY: 99.71 CM/S
ECHO PV PEAK INSTANTANEOUS GRADIENT SYSTOLIC: 3.98 MMHG
ECHO RIGHT VENTRICULAR SYSTOLIC PRESSURE (RVSP): 24.68 MMHG
ECHO RV INTERNAL DIMENSION: 3.19 CM
ECHO TV REGURGITANT MAX VELOCITY: 232.68 CM/S
ECHO TV REGURGITANT PEAK GRADIENT: 21.68 MMHG
EOSINOPHIL # BLD: 0 K/UL (ref 0–0.4)
EOSINOPHIL NFR BLD: 0 % (ref 0–7)
ERYTHROCYTE [DISTWIDTH] IN BLOOD BY AUTOMATED COUNT: 12.8 % (ref 11.5–14.5)
GLUCOSE BLD STRIP.AUTO-MCNC: 135 MG/DL (ref 65–100)
GLUCOSE BLD STRIP.AUTO-MCNC: 135 MG/DL (ref 65–100)
GLUCOSE BLD STRIP.AUTO-MCNC: 167 MG/DL (ref 65–100)
GLUCOSE BLD STRIP.AUTO-MCNC: 74 MG/DL (ref 65–100)
GLUCOSE BLD STRIP.AUTO-MCNC: 80 MG/DL (ref 65–100)
GLUCOSE BLD STRIP.AUTO-MCNC: 92 MG/DL (ref 65–100)
GLUCOSE SERPL-MCNC: 156 MG/DL (ref 65–100)
HCT VFR BLD AUTO: 29.3 % (ref 36.6–50.3)
HGB BLD-MCNC: 9.6 G/DL (ref 12.1–17)
IMM GRANULOCYTES # BLD AUTO: 0 K/UL (ref 0–0.04)
IMM GRANULOCYTES NFR BLD AUTO: 1 % (ref 0–0.5)
LYMPHOCYTES # BLD: 1 K/UL (ref 0.8–3.5)
LYMPHOCYTES NFR BLD: 12 % (ref 12–49)
MAGNESIUM SERPL-MCNC: 1.9 MG/DL (ref 1.6–2.4)
MCH RBC QN AUTO: 30.9 PG (ref 26–34)
MCHC RBC AUTO-ENTMCNC: 32.8 G/DL (ref 30–36.5)
MCV RBC AUTO: 94.2 FL (ref 80–99)
MONOCYTES # BLD: 0.5 K/UL (ref 0–1)
MONOCYTES NFR BLD: 6 % (ref 5–13)
MR PISA PV: 592.57 CM/S
NEUTS SEG # BLD: 6.7 K/UL (ref 1.8–8)
NEUTS SEG NFR BLD: 81 % (ref 32–75)
NRBC # BLD: 0 K/UL (ref 0–0.01)
NRBC BLD-RTO: 0 PER 100 WBC
P-R INTERVAL, ECG05: 240 MS
PLATELET # BLD AUTO: 127 K/UL (ref 150–400)
PMV BLD AUTO: 10 FL (ref 8.9–12.9)
POTASSIUM SERPL-SCNC: 5.9 MMOL/L (ref 3.5–5.1)
Q-T INTERVAL, ECG07: 424 MS
QRS DURATION, ECG06: 72 MS
QTC CALCULATION (BEZET), ECG08: 424 MS
RBC # BLD AUTO: 3.11 M/UL (ref 4.1–5.7)
SERVICE CMNT-IMP: ABNORMAL
SERVICE CMNT-IMP: NORMAL
SODIUM SERPL-SCNC: 132 MMOL/L (ref 136–145)
VENTRICULAR RATE, ECG03: 60 BPM
WBC # BLD AUTO: 8.3 K/UL (ref 4.1–11.1)

## 2021-03-06 PROCEDURE — 80048 BASIC METABOLIC PNL TOTAL CA: CPT

## 2021-03-06 PROCEDURE — 74011636637 HC RX REV CODE- 636/637: Performed by: STUDENT IN AN ORGANIZED HEALTH CARE EDUCATION/TRAINING PROGRAM

## 2021-03-06 PROCEDURE — 85025 COMPLETE CBC W/AUTO DIFF WBC: CPT

## 2021-03-06 PROCEDURE — 77010033678 HC OXYGEN DAILY

## 2021-03-06 PROCEDURE — 93306 TTE W/DOPPLER COMPLETE: CPT | Performed by: INTERNAL MEDICINE

## 2021-03-06 PROCEDURE — 74011250637 HC RX REV CODE- 250/637: Performed by: NURSE PRACTITIONER

## 2021-03-06 PROCEDURE — 99223 1ST HOSP IP/OBS HIGH 75: CPT | Performed by: PSYCHIATRY & NEUROLOGY

## 2021-03-06 PROCEDURE — 74011250637 HC RX REV CODE- 250/637: Performed by: STUDENT IN AN ORGANIZED HEALTH CARE EDUCATION/TRAINING PROGRAM

## 2021-03-06 PROCEDURE — 99223 1ST HOSP IP/OBS HIGH 75: CPT | Performed by: INTERNAL MEDICINE

## 2021-03-06 PROCEDURE — 74011250636 HC RX REV CODE- 250/636: Performed by: PHYSICIAN ASSISTANT

## 2021-03-06 PROCEDURE — 74011250636 HC RX REV CODE- 250/636: Performed by: EMERGENCY MEDICINE

## 2021-03-06 PROCEDURE — 82962 GLUCOSE BLOOD TEST: CPT

## 2021-03-06 PROCEDURE — 2709999900 HC NON-CHARGEABLE SUPPLY

## 2021-03-06 PROCEDURE — 74011000250 HC RX REV CODE- 250: Performed by: STUDENT IN AN ORGANIZED HEALTH CARE EDUCATION/TRAINING PROGRAM

## 2021-03-06 PROCEDURE — 74011250637 HC RX REV CODE- 250/637: Performed by: INTERNAL MEDICINE

## 2021-03-06 PROCEDURE — 83880 ASSAY OF NATRIURETIC PEPTIDE: CPT

## 2021-03-06 PROCEDURE — 36415 COLL VENOUS BLD VENIPUNCTURE: CPT

## 2021-03-06 PROCEDURE — 93306 TTE W/DOPPLER COMPLETE: CPT

## 2021-03-06 PROCEDURE — 83735 ASSAY OF MAGNESIUM: CPT

## 2021-03-06 PROCEDURE — 74011250636 HC RX REV CODE- 250/636: Performed by: STUDENT IN AN ORGANIZED HEALTH CARE EDUCATION/TRAINING PROGRAM

## 2021-03-06 PROCEDURE — 65660000001 HC RM ICU INTERMED STEPDOWN

## 2021-03-06 RX ORDER — MORPHINE SULFATE 2 MG/ML
1 INJECTION, SOLUTION INTRAMUSCULAR; INTRAVENOUS
Status: DISPENSED | OUTPATIENT
Start: 2021-03-06 | End: 2021-03-07

## 2021-03-06 RX ORDER — SODIUM CHLORIDE, SODIUM LACTATE, POTASSIUM CHLORIDE, CALCIUM CHLORIDE 600; 310; 30; 20 MG/100ML; MG/100ML; MG/100ML; MG/100ML
25 INJECTION, SOLUTION INTRAVENOUS CONTINUOUS
Status: CANCELLED | OUTPATIENT
Start: 2021-03-06

## 2021-03-06 RX ORDER — SODIUM CHLORIDE 0.9 % (FLUSH) 0.9 %
5-40 SYRINGE (ML) INJECTION EVERY 8 HOURS
Status: CANCELLED | OUTPATIENT
Start: 2021-03-06

## 2021-03-06 RX ORDER — SODIUM CHLORIDE 0.9 % (FLUSH) 0.9 %
5-40 SYRINGE (ML) INJECTION AS NEEDED
Status: CANCELLED | OUTPATIENT
Start: 2021-03-06

## 2021-03-06 RX ORDER — FENTANYL CITRATE 50 UG/ML
25 INJECTION, SOLUTION INTRAMUSCULAR; INTRAVENOUS
Status: CANCELLED | OUTPATIENT
Start: 2021-03-06

## 2021-03-06 RX ORDER — DIPHENHYDRAMINE HYDROCHLORIDE 50 MG/ML
12.5 INJECTION, SOLUTION INTRAMUSCULAR; INTRAVENOUS AS NEEDED
Status: CANCELLED | OUTPATIENT
Start: 2021-03-06 | End: 2021-03-06

## 2021-03-06 RX ORDER — HYDROMORPHONE HYDROCHLORIDE 2 MG/ML
0.2 INJECTION, SOLUTION INTRAMUSCULAR; INTRAVENOUS; SUBCUTANEOUS
Status: CANCELLED | OUTPATIENT
Start: 2021-03-06

## 2021-03-06 RX ADMIN — SODIUM CHLORIDE 100 ML/HR: 9 INJECTION, SOLUTION INTRAVENOUS at 16:15

## 2021-03-06 RX ADMIN — LATANOPROST 1 DROP: 50 SOLUTION OPHTHALMIC at 06:02

## 2021-03-06 RX ADMIN — ALLOPURINOL 100 MG: 100 TABLET ORAL at 09:58

## 2021-03-06 RX ADMIN — Medication 10 ML: at 16:17

## 2021-03-06 RX ADMIN — Medication 10 ML: at 06:03

## 2021-03-06 RX ADMIN — SODIUM ZIRCONIUM CYCLOSILICATE 15 G: 10 POWDER, FOR SUSPENSION ORAL at 10:00

## 2021-03-06 RX ADMIN — ACETAMINOPHEN 650 MG: 325 TABLET ORAL at 15:34

## 2021-03-06 RX ADMIN — MORPHINE SULFATE 1 MG: 2 INJECTION, SOLUTION INTRAMUSCULAR; INTRAVENOUS at 12:50

## 2021-03-06 RX ADMIN — PREGABALIN 50 MG: 25 CAPSULE ORAL at 09:58

## 2021-03-06 RX ADMIN — DOCUSATE SODIUM - SENNOSIDES 2 TABLET: 50; 8.6 TABLET, FILM COATED ORAL at 09:58

## 2021-03-06 RX ADMIN — INSULIN LISPRO 2 UNITS: 100 INJECTION, SOLUTION INTRAVENOUS; SUBCUTANEOUS at 09:59

## 2021-03-06 RX ADMIN — CYANOCOBALAMIN TAB 500 MCG 1000 MCG: 500 TAB at 09:59

## 2021-03-06 RX ADMIN — LATANOPROST 1 DROP: 50 SOLUTION OPHTHALMIC at 23:07

## 2021-03-06 RX ADMIN — HEPARIN SODIUM 5000 UNITS: 5000 INJECTION INTRAVENOUS; SUBCUTANEOUS at 22:57

## 2021-03-06 RX ADMIN — OXYCODONE 5 MG: 5 TABLET ORAL at 03:37

## 2021-03-06 RX ADMIN — ACETAMINOPHEN 650 MG: 325 TABLET ORAL at 22:57

## 2021-03-06 RX ADMIN — SODIUM CHLORIDE 100 ML/HR: 9 INJECTION, SOLUTION INTRAVENOUS at 06:02

## 2021-03-06 RX ADMIN — DOCUSATE SODIUM - SENNOSIDES 2 TABLET: 50; 8.6 TABLET, FILM COATED ORAL at 17:49

## 2021-03-06 RX ADMIN — ACETAMINOPHEN 650 MG: 325 TABLET ORAL at 09:59

## 2021-03-06 NOTE — ED NOTES
[address] Kandy Meckel to confirm that the Neurology consult should stay at routine status. 2205  TRANSFER - OUT REPORT:    Verbal report given to CURT Stone(name) on Arrie Bamberger  being transferred to [facility](unit) for routine progression of care       Report consisted of patients Situation, Background, Assessment and   Recommendations(SBAR). Information from the following report(s) SBAR, Kardex, ED Summary, Intake/Output and MAR was reviewed with the receiving nurse. Lines:   Peripheral IV 03/05/21 Antecubital (Active)   Site Assessment Clean, dry, & intact 03/05/21 1641   Phlebitis Assessment 0 03/05/21 1641   Infiltration Assessment 0 03/05/21 1641   Dressing Status Clean, dry, & intact 03/05/21 1641   Dressing Type Transparent 03/05/21 1641       Peripheral IV 03/05/21 Left Forearm (Active)   Site Assessment Clean, dry, & intact 03/05/21 1901   Phlebitis Assessment 0 03/05/21 1901   Infiltration Assessment 0 03/05/21 1901   Dressing Status Clean, dry, & intact 03/05/21 1901   Dressing Type Transparent 03/05/21 1901        Opportunity for questions and clarification was provided.       Patient transported with:   Monitor  O2 @ 2 liters  Tech

## 2021-03-06 NOTE — H&P
Hospitalist Admission Note    NAME: Derrell Cooper   :  10/6/1932   MRN:  [id number]     Date/Time:  3/5/2021 11:04 PM    Patient PCP: Morales Thompson MD  ______________________________________________________________________  Given the patient's current clinical presentation, I have a high level of concern for decompensation if discharged from the emergency department. Complex decision making was performed, which includes reviewing the patient's available past medical records, laboratory results, and x-ray films. My assessment of this patient's clinical condition and my plan of care is as follows. Assessment / Plan:    Mechanical fall at home  Right hip fracture  X-ray hip 3 view shows fracture of the right hipcomminuted intertrochanteric fracture of the right hip  -CT head shows no acute abnormality. Chest x-ray showed no acute abnormalities.  -Orthopedics consulted. N.p.o. after midnight, plans to do surgery tomorrow. Pre-Operative Risk Assessment Using 2014 ACC/AHA Guidelines     Emergent procedure: No  Active Cardiac Condition including decompensated HF, Arrhythmia, MI <3 weeks, severe valve disease: No  Risk Level of Procedure: Intermediate Risk (intraperitoneal, intrathoracic, HENT, orthopedic, or carotid endarterectomy, etc.)  Revised Cardiac Risk Index Risk factors: History of cerebrovascular disease (+1) and CKD Cr >2.0 mg/dL (+1)  Measurement of Exercise Tolerance before Surgery >4 METS (climbing > 1 flight of stairs without stopping, walking up hill > 1-2 blocks, scrubbing floors, moving furniture, golf, bowling, dancing or tennis, or running short distance): Unknown    According to the 2014 ACC/AHA pre-operative risk assessment guidelines Derrell Cooper is at elevated risk for major cardiac complications during a Intermediate Risk procedure, exercise tolerance is unknown  and further evaluation may be indicated.  Specific medication recommendations are listed below. Request further recommendations from consultants: Cardiology    Medication Recommendations (taken with a sip of water even when NPO):  NONE     -Ordered echo and BNP. -Cardiology consulted for the clearance. Acute kidney injury  BUN/creatinine26/2.83, hydrating the patient with NS. BMP in a.m. Ultrasound of the kidneys pending. Hyperkalemia  Potassium 5.7, gave D50 and insulin 5 units, Kayexalate and calcium gluconate. BMP in a.m. Acute seizure episode  -Secondary to medication usefentanyl.  -Narcan was given in the ER along with diazepam.  Patient blood pressure dropped and the fluids was given, currently vitals are stable. -Currently stable, alert and oriented x3. CT head negative. Neurology consulted, EEG pending.  -Keppra twice daily, further recommendations waiting on the neurology. Hypertension  Diabetes type 2  Neuropathy  Continue the home medications. Code Status: Full code-   Surrogate Decision Maker: Isaias Lucero    DVT Prophylaxis:   GI Prophylaxis: not indicated    Baseline: Ambulatory with assistancewalker at home      Subjective:   CHIEF COMPLAINT: Right leg groin pain    HISTORY OF PRESENT ILLNESS:     Ben Ely is a 80 y.o.  male who presents with right leg and groin pain that started today after the patient fell down at home. Patient past medical history of hypertension, diabetes type 2, neuropathy, CVA. Patient stated that he fell down today and after that he started having pain in the right groin severe constant, radiating to the right lower leg, unable to take her down because of the pain. Patient then came to the ER, patient was given fentanyl and then started having clonus type seizure episode which lasted for few minutes. Patient was given diazepam immediately along with Narcan. Symptoms resolved, currently alert and oriented x3.   No chest pain, no shortness of breath, no headache, no passing out episodes, no other complaints. We were asked to admit for work up and evaluation of the above problems. Past Medical History:   Diagnosis Date    Diabetes ([address]     Gastrointestinal disorder     Hypertension     Other ill-defined conditions(799.89)     gout, glaucoma    Other ill-defined conditions(799.89)     high cholesterol        Past Surgical History:   Procedure Laterality Date    HX ORTHOPAEDIC      cervical surgery       Social History     Tobacco Use    Smoking status: Never Smoker    Smokeless tobacco: Never Used   Substance Use Topics    Alcohol use: No     Comment: quit        History reviewed. No pertinent family history. No Significant family history  Allergies   Allergen Reactions    Codeine Itching        Prior to Admission medications    Medication Sig Start Date End Date Taking? Authorizing Provider   LYRICA 50 mg capsule TAKE 1 CAPSULE BY MOUTH TWICE DAILY 6/27/19   Other, Phys, MD   FREESTYLE LAVINIA 14 DAY SENSOR kit USE AS DIRECTED 7/22/19   Other, MD Annie   allopurinol (ZYLOPRIM) 100 mg tablet TAKE 1 TABLET BY MOUTH ONCE DAILY 8/9/19   OtherAnnie MD   LUMIGAN 0.01 % ophthalmic drops INSTILL 1 DROP INTO EACH EYE ONCE DAILY AT BEDTIME 7/20/19   Other, MD Annie   cyanocobalamin (VITAMIN B-12) 1,000 mcg tablet Take 1,000 mcg by mouth daily. Other, MD Annie   SITagliptin (JANUVIA) 100 mg tablet Take 100 mg by mouth daily. Other, MD Annie   traZODone (DESYREL) 50 mg tablet Take 1 Tab by mouth nightly as needed for Sleep. Indications: insomnia associated with depression 9/7/18   Roxy Tello MD       REVIEW OF SYSTEMS:     I am not able to complete the review of systems because:    The patient is intubated and sedated    The patient has altered mental status due to his acute medical problems    The patient has baseline aphasia from prior stroke(s)    The patient has baseline dementia and is not reliable historian    The patient is in acute medical distress and unable to provide information           Total of 12 systems reviewed as follows:       POSITIVE= underlined text  Negative = text not underlined  General:  fever, chills, sweats, generalized weakness, weight loss/gain,      loss of appetite   Eyes:    blurred vision, eye pain, loss of vision, double vision  ENT:    rhinorrhea, pharyngitis   Respiratory:   cough, sputum production, SOB, BARRON, wheezing, pleuritic pain   Cardiology:   chest pain, palpitations, orthopnea, PND, edema, syncope   Gastrointestinal:  abdominal pain , N/V, diarrhea, dysphagia, constipation, bleeding   Genitourinary:  frequency, urgency, dysuria, hematuria, incontinence   Muskuloskeletal :  arthralgia, myalgia, back pain, right hip pain  Hematology:  easy bruising, nose or gum bleeding, lymphadenopathy   Dermatological: rash, ulceration, pruritis, color change / jaundice  Endocrine:   hot flashes or polydipsia   Neurological:  headache, dizziness, confusion, focal weakness, paresthesia,     Speech difficulties, memory loss, gait difficulty  Psychological: Feelings of anxiety, depression, agitation    Objective:   VITALS:    Visit Vitals  BP (!) 166/79   Pulse (!) 59   Temp 98.8 °F (37.1 °C)   Resp 26   Ht 5' 6\" (1.676 m)   Wt 76.7 kg (169 lb 1.5 oz)   SpO2 97%   BMI 27.29 kg/m²       PHYSICAL EXAM:    General:    Alert, cooperative, no distress, appears stated age. HEENT: Atraumatic, anicteric sclerae, pink conjunctivae     No oral ulcers, mucosa moist, throat clear, dentition fair  Neck:  Supple, symmetrical,  thyroid: non tender  Lungs:   Clear to auscultation bilaterally. No Wheezing or Rhonchi. No rales. Chest wall:  No tenderness  No Accessory muscle use. Heart:   Regular  rhythm,  No  murmur   No edema  Abdomen:   Soft, non-tender. Not distended. Bowel sounds normal  Extremities: No cyanosis.   No clubbing, limited movement of the hip because of the pain    Skin turgor normal, Capillary refill normal, Radial dial pulse 2+  Skin: Not pale. Not Jaundiced  No rashes   Psych:  Good insight. Not depressed. Not anxious or agitated. Neurologic: EOMs intact. No facial asymmetry. No aphasia or slurred speech. Symmetrical strength, Sensation grossly intact. Alert and oriented X 4.     _______________________________________________________________________  Care Plan discussed with:    Comments   Patient x    Family  x    RN x    Care Manager                    Consultant:  x    _______________________________________________________________________  Expected  Disposition:   Home with Family    HH/PT/OT/RN x   SNF/LTC ? [facility]    ________________________________________________________________________  TOTAL TIME:  60 Minutes    Critical Care Provided     Minutes non procedure based      Comments     Reviewed previous records   >50% of visit spent in counseling and coordination of care  Discussion with patient and/or family and questions answered       ________________________________________________________________________  Signed: Dary Stern MD    Procedures: see electronic medical records for all procedures/Xrays and details which were not copied into this note but were reviewed prior to creation of Plan.     LAB DATA REVIEWED:    Recent Results (from the past 24 hour(s))   EKG, 12 LEAD, INITIAL    Collection Time: 03/05/21  4:47 PM   Result Value Ref Range    Ventricular Rate 60 BPM    Atrial Rate 60 BPM    P-R Interval 240 ms    QRS Duration 72 ms    Q-T Interval 424 ms    QTC Calculation (Bezet) 424 ms    Calculated P Axis 45 degrees    Calculated R Axis 7 degrees    Calculated T Axis 21 degrees    Diagnosis       Sinus rhythm with 1st degree AV block  When compared with ECG of 12-MAY-2018 15:36,  MI interval has increased  QRS axis shifted right  T wave inversion no longer evident in Lateral leads     CBC WITH AUTOMATED DIFF    Collection Time: 03/05/21  4:50 PM   Result Value Ref Range    WBC 5.4 4.1 - 11.1 K/uL    RBC 3.80 (L) 4.10 - 5.70 M/uL    HGB 11.7 (L) 12.1 - 17.0 g/dL    HCT 35.4 (L) 36.6 - 50.3 %    MCV 93.2 80.0 - 99.0 FL    MCH 30.8 26.0 - 34.0 PG    MCHC 33.1 30.0 - 36.5 g/dL    RDW 12.8 11.5 - 14.5 %    PLATELET 002 434 - 883 K/uL    MPV 9.8 8.9 - 12.9 FL    NRBC 0.0 0  WBC    ABSOLUTE NRBC 0.00 0.00 - 0.01 K/uL    NEUTROPHILS 48 32 - 75 %    LYMPHOCYTES 36 12 - 49 %    MONOCYTES 11 5 - 13 %    EOSINOPHILS 4 0 - 7 %    BASOPHILS 1 0 - 1 %    IMMATURE GRANULOCYTES 0 0.0 - 0.5 %    ABS. NEUTROPHILS 2.6 1.8 - 8.0 K/UL    ABS. LYMPHOCYTES 2.0 0.8 - 3.5 K/UL    ABS. MONOCYTES 0.6 0.0 - 1.0 K/UL    ABS. EOSINOPHILS 0.2 0.0 - 0.4 K/UL    ABS. BASOPHILS 0.0 0.0 - 0.1 K/UL    ABS. IMM. GRANS. 0.0 0.00 - 0.04 K/UL    DF AUTOMATED     METABOLIC PANEL, COMPREHENSIVE    Collection Time: 03/05/21  4:50 PM   Result Value Ref Range    Sodium 134 (L) 136 - 145 mmol/L    Potassium 5.7 (H) 3.5 - 5.1 mmol/L    Chloride 104 97 - 108 mmol/L    CO2 27 21 - 32 mmol/L    Anion gap 3 (L) 5 - 15 mmol/L    Glucose 84 65 - 100 mg/dL    BUN 26 (H) 6 - 20 MG/DL    Creatinine 2.83 (H) 0.70 - 1.30 MG/DL    BUN/Creatinine ratio 9 (L) 12 - 20      GFR est AA 26 (L) >60 ml/min/1.73m2    GFR est non-AA 21 (L) >60 ml/min/1.73m2    Calcium 8.9 8.5 - 10.1 MG/DL    Bilirubin, total 0.4 0.2 - 1.0 MG/DL    ALT (SGPT) 24 12 - 78 U/L    AST (SGOT) 29 15 - 37 U/L    Alk.  phosphatase 160 (H) 45 - 117 U/L    Protein, total 7.7 6.4 - 8.2 g/dL    Albumin 3.6 3.5 - 5.0 g/dL    Globulin 4.1 (H) 2.0 - 4.0 g/dL    A-G Ratio 0.9 (L) 1.1 - 2.2     PROTHROMBIN TIME + INR    Collection Time: 03/05/21  4:50 PM   Result Value Ref Range    INR 1.1 0.9 - 1.1      Prothrombin time 11.8 (H) 9.0 - 11.1 sec   TYPE & SCREEN    Collection Time: 03/05/21  7:34 PM   Result Value Ref Range    Crossmatch Expiration 03/08/2021,2359     ABO/Rh(D) O POSITIVE     Antibody screen NEG    URINALYSIS W/ REFLEX CULTURE    Collection Time: 03/05/21  9:43 PM    Specimen: Urine   Result Value Ref Range Color YELLOW/STRAW      Appearance CLEAR CLEAR      Specific gravity 1.010 1.003 - 1.030      pH (UA) 6.5 5.0 - 8.0      Protein Negative NEG mg/dL    Glucose 500 (A) NEG mg/dL    Ketone Negative NEG mg/dL    Bilirubin Negative NEG      Blood TRACE (A) NEG      Urobilinogen 0.2 0.2 - 1.0 EU/dL    Nitrites Negative NEG      Leukocyte Esterase Negative NEG      WBC 0-4 0 - 4 /hpf    RBC 5-10 0 - 5 /hpf    Epithelial cells FEW FEW /lpf    Bacteria Negative NEG /hpf    UA:UC IF INDICATED CULTURE NOT INDICATED BY UA RESULT CNI      Hyaline cast 0-2 0 - 5 /lpf

## 2021-03-06 NOTE — CONSULTS
IP CONSULT TO NEUROLOGY  Consult performed by: Samson Raza MD  Consult ordered by: Crystal Scott MD            NEUROLOGY CONSULT    NAME Derrell Cooper AGE 80 y.o. MRN [id number]  10/6/1932     REQUESTING PHYSICIAN: Dario Lemus,*      CHIEF COMPLAINT: Seizure     This is a 80 y.o. right-handed male who suffered a ground-level fall resulting in a right hip fracture. In the emergency department he was complaining of pain. He was started on fentanyl and had a seizure-like event. Patient does not have a history of seizures and does not have a family history of seizures to his recollection. He has not had a seizure since. ASSESSMENT AND PLAN     1.  Seizure  Most likely dystonic reaction to the fentanyl. Discontinue levetiracetam  EEG  CT of the head independently reviewed    2. Delirium  Use sedating pain management sparingly but control the pain    3. Acute renal failure, unspecified acute renal failure type ([address]  Continue hydration    4. Gout  Continue allopurinol    5.   Diabetes type 2  Continue his         ALLERGIES:  Codeine and Fentanyl     Current Facility-Administered Medications   Medication Dose Route Frequency    sodium zirconium cyclosilicate (LOKELMA) powder packet 15 g  15 g Oral NOW    sodium chloride (NS) flush 5-40 mL  5-40 mL IntraVENous Q8H    0.9% sodium chloride infusion  100 mL/hr IntraVENous CONTINUOUS    sodium chloride (NS) flush 5-40 mL  5-40 mL IntraVENous PRN    acetaminophen (TYLENOL) tablet 650 mg  650 mg Oral Q6H    naloxone (NARCAN) injection 0.4 mg  0.4 mg IntraVENous PRN    senna-docusate (PERICOLACE) 8.6-50 mg per tablet 2 Tab  2 Tab Oral BID    oxyCODONE IR (ROXICODONE) tablet 2.5 mg  2.5 mg Oral Q4H PRN    oxyCODONE IR (ROXICODONE) tablet 5 mg  5 mg Oral Q4H PRN    allopurinoL (ZYLOPRIM) tablet 100 mg  100 mg Oral DAILY    pregabalin (LYRICA) capsule 50 mg  50 mg Oral DAILY    traZODone (DESYREL) tablet 50 mg  50 mg Oral QHS PRN    latanoprost (XALATAN) 0.005 % ophthalmic solution 1 Drop  1 Drop Both Eyes QHS    cyanocobalamin (VITAMIN B12) tablet 1,000 mcg  1,000 mcg Oral DAILY    acetaminophen (TYLENOL) tablet 650 mg  650 mg Oral Q6H PRN    Or    acetaminophen (TYLENOL) suppository 650 mg  650 mg Rectal Q6H PRN    polyethylene glycol (MIRALAX) packet 17 g  17 g Oral DAILY PRN    promethazine (PHENERGAN) tablet 12.5 mg  12.5 mg Oral Q6H PRN    Or    ondansetron (ZOFRAN) injection 4 mg  4 mg IntraVENous Q6H PRN    heparin (porcine) injection 5,000 Units  5,000 Units SubCUTAneous Q8H    insulin lispro (HUMALOG) injection   SubCUTAneous AC&HS    glucose chewable tablet 16 g  4 Tab Oral PRN    dextrose (D50W) injection syrg 12.5-25 g  12.5-25 g IntraVENous PRN    glucagon (GLUCAGEN) injection 1 mg  1 mg IntraMUSCular PRN    levETIRAcetam (KEPPRA) 500 mg in saline (iso-osm) 100 mL IVPB (premix)  500 mg IntraVENous Q12H       Past Medical History:   Diagnosis Date    Diabetes ([facility] [address]     Gastrointestinal disorder     Hypertension     Other ill-defined conditions(799.89)     gout, glaucoma    Other ill-defined conditions(799.89)     high cholesterol       Social History     Tobacco Use    Smoking status: Never Smoker    Smokeless tobacco: Never Used   Substance Use Topics    Alcohol use: No     Comment: quit       Family history  No seizures  No stroke        Visit Vitals  /75 (BP 1 Location: Left upper arm, BP Patient Position: At rest)   Pulse 71   Temp 98.2 °F (36.8 °C)   Resp 18   Ht 5' 6\" (1.676 m)   Wt 169 lb 1.5 oz (76.7 kg)   SpO2 100%   BMI 27.29 kg/m²      General: Well developed, well nourished. Patient in no distress   Head: Normocephalic, atraumatic, anicteric sclera   Neck Normal ROM, No thyromegally   Lungs:  Clear to auscultation bilaterally   Cardiac: Regular rate and rhythm with no murmurs. Abd: Bowel sounds were audible.     Ext: No pedal edema   Skin: Supple no rash     NeurologicExam:  Mental Status: Alert and oriented to person only   Speech: Fluent no aphasia or dysarthria. Cranial Nerves:  II - XII Intact with exception of bilateral visual loss secondary to glaucoma   Motor:  Full and symmetric strength of upper and left lower extremity right lower extremity is not examined secondary to fracture  Normal bulk and tone. Reflexes:   Deep tendon reflexes 1+/4 and symmetric. Sensory:   Symmetric and intact with no deficits    Gait:   Deferred   Tremor:   No tremor noted. Cerebellar:  Coordination intact. Neurovascular: No carotid bruits. No JVD           REVIEWED IMAGING:    MRI :  Results from Hospital Encounter encounter on 04/28/15   MRI BRAIN WO CONT    Narrative **Final Report**       ICD Codes / Adm. Diagnosis: 784.0   / Headache(784.0)    Examination:  MRI BRAIN WO CON  - [id number] - Apr 28 2015 10:44AM  Accession No:  [id number]  Reason:  headaches      REPORT:  INDICATION:   Headaches     EXAMINATION:  MRI BRAIN WO CONTRAST    COMPARISON:  July 25, 2014    TECHNIQUE:  MR imaging of the brain was performed with sagittal T1, axial   T1, T2, FLAIR, GRE, DWI/ADC, coronal T2.    FINDINGS:      There is moderate global volume loss resulting in prominence of the sulci,   cisterns and ventricles without hydrocephalus or midline shift. There is no   acute intra or extra-axial fluid collection. There is moderate chronic   microvascular ischemic disease in the periventricular regions of the   cerebral hemispheres as well as within the salud. Small areas of left   parietal and occipital infarction. There is no acute infarction. The major   intracranial vascular flow-voids are patent. IMPRESSION:  Chronic ischemic changes with no acute infarction.               Signing/Reading Doctor: Lin Roberts ([id number])    Approved: Lin Roberts ([id number])  Apr 28 2015 11:31AM                                    CT:  Results from Hospital Encounter encounter on 03/05/21   CT HEAD WO CONT    Narrative EXAM: CT HEAD WO CONT    INDICATION: GLF, CHI    COMPARISON: June 2018. CONTRAST: None. TECHNIQUE: Unenhanced CT of the head was performed using 5 mm images. Brain and  bone windows were generated. Coronal and sagittal reformats. CT dose reduction  was achieved through use of a standardized protocol tailored for this  examination and automatic exposure control for dose modulation. FINDINGS:  Mild atrophy and white matter disease. No extra-axial fluid collection, hemorrhage shift or masses. Small remote right basal ganglia lacune. No mass. Mucosal thickening sphenoid sinus stable. Impression No acute changes.        REVIEWED LABS:  Lab Results   Component Value Date/Time    WBC 8.3 03/06/2021 03:24 AM    HCT 29.3 (L) 03/06/2021 03:24 AM    HGB 9.6 (L) 03/06/2021 03:24 AM    PLATELET 518 (L) 98/88/8975 03:24 AM     Lab Results   Component Value Date/Time    Sodium 132 (L) 03/06/2021 03:24 AM    Potassium 5.9 (H) 03/06/2021 03:24 AM    Chloride 105 03/06/2021 03:24 AM    CO2 21 03/06/2021 03:24 AM    Glucose 156 (H) 03/06/2021 03:24 AM    BUN 26 (H) 03/06/2021 03:24 AM    Creatinine 2.62 (H) 03/06/2021 03:24 AM    Calcium 8.4 (L) 03/06/2021 03:24 AM     Lab Results   Component Value Date/Time    LDL, calculated 55.6 07/25/2014 04:23 AM     Lab Results   Component Value Date/Time    Hemoglobin A1c 8.4 (H) 07/25/2014 04:23 AM

## 2021-03-06 NOTE — CONSULTS
ORTHOPAEDIC CONSULT NOTE    Subjective:     Date of Consultation:  March 5, 2021      Jackie Kevin is a 80 y.o. male who is being seen for R hip pain s/p GLF. Work up has reveled a R intertroch fx. Pt lives with his girlfriend, has assitance [facility] medical appts and next of kin his grandson. Pt's family at bedside during exam. Plan of care discussed with pt and family, all questions/concerns addressed and pt and family verbalized understanding of plan of care. 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 No pertinent family history. Social History     Tobacco Use    Smoking status: Never Smoker    Smokeless tobacco: Never Used   Substance Use Topics    Alcohol use: No     Comment: quit     Past Medical History:   Diagnosis Date    Diabetes ([facility]     Gastrointestinal disorder     Hypertension     Other ill-defined conditions(799.89)     gout, glaucoma    Other ill-defined conditions(799.89)     high cholesterol      Past Surgical History:   Procedure Laterality Date    HX ORTHOPAEDIC      cervical surgery      Prior to Admission medications    Medication Sig Start Date End Date Taking? Authorizing Provider   LYRICA 50 mg capsule TAKE 1 CAPSULE BY MOUTH TWICE DAILY 6/27/19   Other, Phys, MD   FREESTYLE LAVINIA 14 DAY SENSOR kit USE AS DIRECTED 7/22/19   Other, MD Annie   allopurinol (ZYLOPRIM) 100 mg tablet TAKE 1 TABLET BY MOUTH ONCE DAILY 8/9/19   OtherAnnie MD LUMIGAN 0.01 % ophthalmic drops INSTILL 1 DROP INTO EACH EYE ONCE DAILY AT BEDTIME 7/20/19   Other, MD Annie   cyanocobalamin (VITAMIN B-12) 1,000 mcg tablet Take 1,000 mcg by mouth daily.     Other, MD Annie   SITagliptin (JANUVIA) 100 mg tablet Take 100 mg by mouth daily. Other, MD Annie   traZODone (DESYREL) 50 mg tablet Take 1 Tab by mouth nightly as needed for Sleep. Indications: insomnia associated with depression 9/7/18   Sujatha Rai MD     Current Facility-Administered Medications   Medication Dose Route Frequency    sodium chloride (NS) flush 5-40 mL  5-40 mL IntraVENous Q8H    sodium chloride (NS) flush 5-40 mL  5-40 mL IntraVENous PRN    0.9% sodium chloride infusion  100 mL/hr IntraVENous CONTINUOUS    naloxone (NARCAN) 1 mg/mL injection        sodium chloride (NS) flush 5-40 mL  5-40 mL IntraVENous Q8H    sodium chloride (NS) flush 5-40 mL  5-40 mL IntraVENous PRN    acetaminophen (TYLENOL) tablet 650 mg  650 mg Oral Q6H    naloxone (NARCAN) injection 0.4 mg  0.4 mg IntraVENous PRN    senna-docusate (PERICOLACE) 8.6-50 mg per tablet 2 Tab  2 Tab Oral BID    oxyCODONE IR (ROXICODONE) tablet 2.5 mg  2.5 mg Oral Q4H PRN    oxyCODONE IR (ROXICODONE) tablet 5 mg  5 mg Oral Q4H PRN    insulin regular (NOVOLIN R, HUMULIN R) injection 5 Units  5 Units IntraVENous ONCE    sodium polystyrene (KAYEXALATE) 15 gram/60 mL oral suspension 30 g  30 g Oral NOW    [START ON 3/6/2021] allopurinoL (ZYLOPRIM) tablet 100 mg  100 mg Oral DAILY    [START ON 3/6/2021] pregabalin (LYRICA) capsule 50 mg  50 mg Oral DAILY    traZODone (DESYREL) tablet 50 mg  50 mg Oral QHS PRN    . PHARMACY TO SUBSTITUTE PER PROTOCOL (Reordered from: LUMIGAN 0.01 % ophthalmic drops)    Per Protocol    [START ON 3/6/2021] cyanocobalamin (VITAMIN B12) tablet 1,000 mcg  1,000 mcg Oral DAILY    sodium chloride (NS) flush 5-40 mL  5-40 mL IntraVENous Q8H    sodium chloride (NS) flush 5-40 mL  5-40 mL IntraVENous PRN    acetaminophen (TYLENOL) tablet 650 mg  650 mg Oral Q6H PRN    Or    acetaminophen (TYLENOL) suppository 650 mg  650 mg Rectal Q6H PRN    polyethylene glycol (MIRALAX) packet 17 g  17 g Oral DAILY PRN    promethazine (PHENERGAN) tablet 12.5 mg  12.5 mg Oral Q6H PRN    Or    ondansetron (ZOFRAN) injection 4 mg  4 mg IntraVENous Q6H PRN    heparin (porcine) injection 5,000 Units  5,000 Units SubCUTAneous Q8H    calcium gluconate 1 g in 0.9% sodium chloride 100 mL IVPB  1 g IntraVENous ONCE    insulin lispro (HUMALOG) injection   SubCUTAneous AC&HS    glucose chewable tablet 16 g  4 Tab Oral PRN    dextrose (D50W) injection syrg 12.5-25 g  12.5-25 g IntraVENous PRN    glucagon (GLUCAGEN) injection 1 mg  1 mg IntraMUSCular PRN    levETIRAcetam (KEPPRA) 500 mg in saline (iso-osm) 100 mL IVPB (premix)  500 mg IntraVENous Q12H     Current Outpatient Medications   Medication Sig    LYRICA 50 mg capsule TAKE 1 CAPSULE BY MOUTH TWICE DAILY    FREESTYLE LAVINIA 14 DAY SENSOR kit USE AS DIRECTED    allopurinol (ZYLOPRIM) 100 mg tablet TAKE 1 TABLET BY MOUTH ONCE DAILY    LUMIGAN 0.01 % ophthalmic drops INSTILL 1 DROP INTO EACH EYE ONCE DAILY AT BEDTIME    cyanocobalamin (VITAMIN B-12) 1,000 mcg tablet Take 1,000 mcg by mouth daily.  SITagliptin (JANUVIA) 100 mg tablet Take 100 mg by mouth daily.  traZODone (DESYREL) 50 mg tablet Take 1 Tab by mouth nightly as needed for Sleep. Indications: insomnia associated with depression      Allergies   Allergen Reactions    Codeine Itching        Review of Systems:  A comprehensive review of systems was negative except for that written in the HPI.     Mental Status: no dementia    Objective:     Patient Vitals for the past 8 hrs:   BP Temp Pulse Resp SpO2 Height Weight   21 (!) 166/79 98.8 °F (37.1 °C) (!) 59 16 100 %     21 1850 (!) 142/68  73 16 99 %     21 1800 (!) 172/90  67 16 98 %     21 1713 (!) 183/89  63 14 97 %     21 1631 (!) 170/97 98.2 °F (36.8 °C) 69 20 100 % 5' 6\" (1.676 m) 76.7 kg (169 lb 1.5 oz)     Temp (24hrs), Av.5 °F (36.9 °C), Min:98.2 °F (36.8 °C), Max:98.8 °F (37.1 °C)      Gen: Well-developed,  in no acute distress   Musc: RLE - in position of comfort, NVI   Skin: No skin breakdown noted. Skin warm, pink, dry  Neuro: Cranial nerves are grossly intact, no focal motor weakness, follows commands appropriately   Psych:  oriented to person, place and time, alert    Imaging Review: EXAM: XR HIP RT W OR WO PELV 2-3 VWS     INDICATION: GLF, pain right hip with deformity.     COMPARISON: None.     FINDINGS: AP view of the pelvis and a frogleg lateral view of the right hip  demonstrate comminuted intertrochanteric fracture of the right hip.     IMPRESSION   IMPRESSION:   Fracture right hip. Labs:   Recent Results (from the past 24 hour(s))   EKG, 12 LEAD, INITIAL    Collection Time: 03/05/21  4:47 PM   Result Value Ref Range    Ventricular Rate 60 BPM    Atrial Rate 60 BPM    P-R Interval 240 ms    QRS Duration 72 ms    Q-T Interval 424 ms    QTC Calculation (Bezet) 424 ms    Calculated P Axis 45 degrees    Calculated R Axis 7 degrees    Calculated T Axis 21 degrees    Diagnosis       Sinus rhythm with 1st degree AV block  When compared with ECG of 12-MAY-2018 15:36,  MN interval has increased  QRS axis shifted right  T wave inversion no longer evident in Lateral leads     CBC WITH AUTOMATED DIFF    Collection Time: 03/05/21  4:50 PM   Result Value Ref Range    WBC 5.4 4.1 - 11.1 K/uL    RBC 3.80 (L) 4.10 - 5.70 M/uL    HGB 11.7 (L) 12.1 - 17.0 g/dL    HCT 35.4 (L) 36.6 - 50.3 %    MCV 93.2 80.0 - 99.0 FL    MCH 30.8 26.0 - 34.0 PG    MCHC 33.1 30.0 - 36.5 g/dL    RDW 12.8 11.5 - 14.5 %    PLATELET 865 671 - 828 K/uL    MPV 9.8 8.9 - 12.9 FL    NRBC 0.0 0  WBC    ABSOLUTE NRBC 0.00 0.00 - 0.01 K/uL    NEUTROPHILS 48 32 - 75 %    LYMPHOCYTES 36 12 - 49 %    MONOCYTES 11 5 - 13 %    EOSINOPHILS 4 0 - 7 %    BASOPHILS 1 0 - 1 %    IMMATURE GRANULOCYTES 0 0.0 - 0.5 %    ABS. NEUTROPHILS 2.6 1.8 - 8.0 K/UL    ABS. LYMPHOCYTES 2.0 0.8 - 3.5 K/UL    ABS. MONOCYTES 0.6 0.0 - 1.0 K/UL    ABS. EOSINOPHILS 0.2 0.0 - 0.4 K/UL    ABS. BASOPHILS 0.0 0.0 - 0.1 K/UL    ABS. IMM. GRANS. 0.0 0.00 - 0.04 K/UL    DF AUTOMATED     METABOLIC PANEL, COMPREHENSIVE    Collection Time: 03/05/21  4:50 PM   Result Value Ref Range    Sodium 134 (L) 136 - 145 mmol/L    Potassium 5.7 (H) 3.5 - 5.1 mmol/L    Chloride 104 97 - 108 mmol/L    CO2 27 21 - 32 mmol/L    Anion gap 3 (L) 5 - 15 mmol/L    Glucose 84 65 - 100 mg/dL    BUN 26 (H) 6 - 20 MG/DL    Creatinine 2.83 (H) 0.70 - 1.30 MG/DL    BUN/Creatinine ratio 9 (L) 12 - 20      GFR est AA 26 (L) >60 ml/min/1.73m2    GFR est non-AA 21 (L) >60 ml/min/1.73m2    Calcium 8.9 8.5 - 10.1 MG/DL    Bilirubin, total 0.4 0.2 - 1.0 MG/DL    ALT (SGPT) 24 12 - 78 U/L    AST (SGOT) 29 15 - 37 U/L    Alk.  phosphatase 160 (H) 45 - 117 U/L    Protein, total 7.7 6.4 - 8.2 g/dL    Albumin 3.6 3.5 - 5.0 g/dL    Globulin 4.1 (H) 2.0 - 4.0 g/dL    A-G Ratio 0.9 (L) 1.1 - 2.2     PROTHROMBIN TIME + INR    Collection Time: 03/05/21  4:50 PM   Result Value Ref Range    INR 1.1 0.9 - 1.1      Prothrombin time 11.8 (H) 9.0 - 11.1 sec   TYPE & SCREEN    Collection Time: 03/05/21  7:34 PM   Result Value Ref Range    Crossmatch Expiration 03/08/2021,2359     ABO/Rh(D) O POSITIVE     Antibody screen NEG          Impression:     Patient Active Problem List    Diagnosis Date Noted    Hyperkalemia 03/05/2021    Hip fracture ([facility] 03/05/2021    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 09/05/2018    Depressive disorder 09/04/2018    TIA (transient ischemic attack) 07/24/2014     Active Problems:    Hyperkalemia (3/5/2021)      Hip fracture ([facility] (3/5/2021)      Seizure ([facility] (3/5/2021)      LUCY (acute kidney injury) ([facility] (3/5/2021)        Plan:   -  Plan for IMN with Dr. Ruthann Lerma in the AM  -  NPO, bedrest, barnett, IVF, pain management  -  Clearance pending, neurology consulted due to Critical access hospital like activity after getting a dose of IV fentanyl   -  DVT Prophylaxis - SCDs pre op    Dr. Shamika Shah aware and agrees with plan as above.         Payam Andrade, NP  Orthopedic Nurse Practitioner   [place]

## 2021-03-06 NOTE — PROGRESS NOTES
0700: Bedside shift change report given to Tristin Duncan (oncoming nurse) by Sil Hoskins (offgoing nurse). Report included the following information SBAR and Kardex. 1450: Bin Vanessa MD regarding pt diet orders d/t Sx being moved to tomorrow; pt NPO at midnight but no diet orders to eat in the meanwhile. Also asked if we are holding heparin until after Sx tomorrow. 1540: Pt requested burger for dinner. Called and ordered for patient. 1610: [address]    Patient with hypoglycemic episode(s) at 1606 (time) on 3/6/2021(date). BG value(s) pre-treatment 76    Was patient symptomatic?  [] yes, [x] no  Patient was treated with the following rescue medications/treatments: [] D50                [] Glucose tablets                [] Glucagon              x2 [x] 4oz juice                [] 6oz reg soda                [] 8oz low fat milk  BG value post-treatment: 92  Once BG treated and value greater than 80mg/dl, pt was provided with the following:  [] snack  [x] meal  Name of MD notified:Antonio TORO  The following orders were received: none

## 2021-03-06 NOTE — PROGRESS NOTES
Ortho:    Right intertrochanteric hip fracture  Son at bedside  Ambulated with walker at baseline  Live at home with his girlfriend     NAD  LE exam- No sign of LE VTE. NV exam intact LE Bilat.   RLE shortened and rotated  + barnett  Bedrest    Cardiology and hospitalist services continue to medically optimize pt  Currently on Heparin--- DC at midnight  NPO after midnight  Plan for troch nail Sunday    Pain med regimen adjusted --- may be sensitive to narcotics      Plan per Dr Ne Pool, PA-C

## 2021-03-06 NOTE — ACP (ADVANCE CARE PLANNING)
Advance Care Planning Note      NAME: Talat Woodall   :  10/6/1932   MRN:  [id number]     Date/Time:  3/5/2021 11:34 PM    Active Diagnoses:  Hospital Problems  Date Reviewed: 2014          Codes Class Noted POA    Hyperkalemia ICD-10-CM: E87.5  ICD-9-CM: 276.7  3/5/2021 Unknown        Hip fracture ([facility] ICD-10-CM: S72.009A  ICD-9-CM: 820.8  3/5/2021 Unknown        [facility] ([facility] ICD-10-CM: R56.9  ICD-9-CM: 780.39  3/5/2021 Unknown        LUCY (acute kidney injury) ([facility] ICD-10-CM: N17.9  ICD-9-CM: 584.9  3/5/2021 Unknown              These active diagnoses are of sufficient risk that focused discussion on advance care planning is indicated in order to allow the patient to thoughtfully consider personal goals of care, and if situations arise that prevent the ability to personally give input, to ensure appropriate representation of their personal desires for different levels and aggressiveness of care. Discussion:   Code status addressed and wants to be a Full Code. Patient wants central line and vasopressors if needed. Patient  would like to assign Felicitas Jakcson  as the surrogate decision maker. Persons present and participating in discussion: Roseann Bear MD, Felicitas Jackson      Time Spent:   Total time spent face-to-face in education and discussion:   16  minutes.          Skylar Caro MD   Hospitalist

## 2021-03-06 NOTE — CONSULTS
Ortho Consult Note:    Pt is an 81 yo M with R hip pain s/p GLF, PMH of HTN, DM, high cholesterol and Gout. WOrk up has reveled R intertroch fracture.    Plan for IMN in AM with Dr. Aj Preciado  NPO after midnight, pain management, barnett, IVF   Medical clearance pending

## 2021-03-06 NOTE — CONSULTS
JOINT  CONSULT    Subjective:     Date of Consultation:  March 6, 2021    Referring Physician:  TA    Marjorie Huizar is a 80 y.o. male who is being seen for R hip pain s/p GLF. Work up has reveled a R intertroch fx. Pt lives with his girlfriend, has assitance iw medical appts and next of kin his grandson. Pt's family at bedside during exam. Plan of care discussed with pt and family, all questions/concerns addressed and pt and family verbalized understanding of plan of care. 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 No pertinent family history. Social History     Tobacco Use    Smoking status: Never Smoker    Smokeless tobacco: Never Used   Substance Use Topics    Alcohol use: No     Comment: quit     Past Medical History:   Diagnosis Date    Diabetes ([address]     Gastrointestinal disorder     Hypertension     Other ill-defined conditions(799.89)     gout, glaucoma    Other ill-defined conditions(799.89)     high cholesterol      Past Surgical History:   Procedure Laterality Date    HX ORTHOPAEDIC      cervical surgery      Prior to Admission medications    Medication Sig Start Date End Date Taking?  Authorizing Provider   LYRICA 50 mg capsule TAKE 1 CAPSULE BY MOUTH TWICE DAILY 6/27/19   Other, Phys, MD   FREESTYLE LAVINIA 14 DAY SENSOR kit USE AS DIRECTED 7/22/19   Other, MD Annie   allopurinol (ZYLOPRIM) 100 mg tablet TAKE 1 TABLET BY MOUTH ONCE DAILY 8/9/19   Annie Ramirez MD   LUMIGAN 0.01 % ophthalmic drops INSTILL 1 DROP INTO EACH EYE ONCE DAILY AT BEDTIME 7/20/19   Ashley, MD Annie   cyanocobalamin (VITAMIN B-12) 1,000 mcg tablet Take 1,000 mcg by mouth daily. Annie Ramirez MD   SITagliptin (JANUVIA) 100 mg tablet Take 100 mg by mouth daily. Annie Ramirez MD   traZODone (DESYREL) 50 mg tablet Take 1 Tab by mouth nightly as needed for Sleep.  Indications: insomnia associated with depression 9/7/18   Sujatha Rai MD     Current Facility-Administered Medications   Medication Dose Route Frequency    morphine injection 1 mg  1 mg IntraVENous Q3H PRN    sodium chloride (NS) flush 5-40 mL  5-40 mL IntraVENous Q8H    0.9% sodium chloride infusion  100 mL/hr IntraVENous CONTINUOUS    sodium chloride (NS) flush 5-40 mL  5-40 mL IntraVENous PRN    acetaminophen (TYLENOL) tablet 650 mg  650 mg Oral Q6H    naloxone (NARCAN) injection 0.4 mg  0.4 mg IntraVENous PRN    senna-docusate (PERICOLACE) 8.6-50 mg per tablet 2 Tab  2 Tab Oral BID    oxyCODONE IR (ROXICODONE) tablet 2.5 mg  2.5 mg Oral Q4H PRN    allopurinoL (ZYLOPRIM) tablet 100 mg  100 mg Oral DAILY    pregabalin (LYRICA) capsule 50 mg  50 mg Oral DAILY    traZODone (DESYREL) tablet 50 mg  50 mg Oral QHS PRN    latanoprost (XALATAN) 0.005 % ophthalmic solution 1 Drop  1 Drop Both Eyes QHS    cyanocobalamin (VITAMIN B12) tablet 1,000 mcg  1,000 mcg Oral DAILY    acetaminophen (TYLENOL) tablet 650 mg  650 mg Oral Q6H PRN    Or    acetaminophen (TYLENOL) suppository 650 mg  650 mg Rectal Q6H PRN    polyethylene glycol (MIRALAX) packet 17 g  17 g Oral DAILY PRN    promethazine (PHENERGAN) tablet 12.5 mg  12.5 mg Oral Q6H PRN    Or    ondansetron (ZOFRAN) injection 4 mg  4 mg IntraVENous Q6H PRN    heparin (porcine) injection 5,000 Units  5,000 Units SubCUTAneous Q8H    insulin lispro (HUMALOG) injection   SubCUTAneous AC&HS    glucose chewable tablet 16 g  4 Tab Oral PRN    dextrose (D50W) injection syrg 12.5-25 g  12.5-25 g IntraVENous PRN    glucagon (GLUCAGEN) injection 1 mg  1 mg IntraMUSCular PRN     Allergies   Allergen Reactions    Codeine Itching    Fentanyl Other (comments) Questionable Sz with IV admin        Review of Systems:  Pertinent items are noted in HPI. Objective:     Patient Vitals for the past 8 hrs:   BP Temp Pulse Resp SpO2   21 1032 136/80 98.3 °F (36.8 °C) 65 18 96 %   21 0722 108/75 98.2 °F (36.8 °C) 71 18 100 %     Temp (24hrs), Av.3 °F (36.8 °C), Min:98.1 °F (36.7 °C), Max:98.8 °F (37.1 °C)        Physical Exam:  General:     Alert and oriented. No acute distress. Chest:     Respirations unlabored. Abdomen:     Extremities:   Soft, non-tender. No evidence of cyanosis. Pulses palpable in both upper and lower extremities. Thelma's sign negative in bilateral lower extremities. Moving all extremities. Pain with PROM/AROM R hip. Neurologic:  No motor deficits. Sensation stable. Neurovascular exam within normal limits.                  Data Review   Recent Results (from the past 24 hour(s))   EKG, 12 LEAD, INITIAL    Collection Time: 21  4:47 PM   Result Value Ref Range    Ventricular Rate 60 BPM    Atrial Rate 60 BPM    P-R Interval 240 ms    QRS Duration 72 ms    Q-T Interval 424 ms    QTC Calculation (Bezet) 424 ms    Calculated P Axis 45 degrees    Calculated R Axis 7 degrees    Calculated T Axis 21 degrees    Diagnosis       Sinus rhythm with 1st degree AV block  When compared with ECG of 12-MAY-2018 15:36,  PA interval has increased  QRS axis shifted right  T wave inversion no longer evident in Lateral leads     CBC WITH AUTOMATED DIFF    Collection Time: 21  4:50 PM   Result Value Ref Range    WBC 5.4 4.1 - 11.1 K/uL    RBC 3.80 (L) 4.10 - 5.70 M/uL    HGB 11.7 (L) 12.1 - 17.0 g/dL    HCT 35.4 (L) 36.6 - 50.3 %    MCV 93.2 80.0 - 99.0 FL    MCH 30.8 26.0 - 34.0 PG    MCHC 33.1 30.0 - 36.5 g/dL    RDW 12.8 11.5 - 14.5 %    PLATELET 585 965 - 955 K/uL    MPV 9.8 8.9 - 12.9 FL    NRBC 0.0 0  WBC    ABSOLUTE NRBC 0.00 0.00 - 0.01 K/uL    NEUTROPHILS 48 32 - 75 %    LYMPHOCYTES 36 12 - 49 %    MONOCYTES 11 5 - 13 % EOSINOPHILS 4 0 - 7 %    BASOPHILS 1 0 - 1 %    IMMATURE GRANULOCYTES 0 0.0 - 0.5 %    ABS. NEUTROPHILS 2.6 1.8 - 8.0 K/UL    ABS. LYMPHOCYTES 2.0 0.8 - 3.5 K/UL    ABS. MONOCYTES 0.6 0.0 - 1.0 K/UL    ABS. EOSINOPHILS 0.2 0.0 - 0.4 K/UL    ABS. BASOPHILS 0.0 0.0 - 0.1 K/UL    ABS. IMM. GRANS. 0.0 0.00 - 0.04 K/UL    DF AUTOMATED     METABOLIC PANEL, COMPREHENSIVE    Collection Time: 03/05/21  4:50 PM   Result Value Ref Range    Sodium 134 (L) 136 - 145 mmol/L    Potassium 5.7 (H) 3.5 - 5.1 mmol/L    Chloride 104 97 - 108 mmol/L    CO2 27 21 - 32 mmol/L    Anion gap 3 (L) 5 - 15 mmol/L    Glucose 84 65 - 100 mg/dL    BUN 26 (H) 6 - 20 MG/DL    Creatinine 2.83 (H) 0.70 - 1.30 MG/DL    BUN/Creatinine ratio 9 (L) 12 - 20      GFR est AA 26 (L) >60 ml/min/1.73m2    GFR est non-AA 21 (L) >60 ml/min/1.73m2    Calcium 8.9 8.5 - 10.1 MG/DL    Bilirubin, total 0.4 0.2 - 1.0 MG/DL    ALT (SGPT) 24 12 - 78 U/L    AST (SGOT) 29 15 - 37 U/L    Alk.  phosphatase 160 (H) 45 - 117 U/L    Protein, total 7.7 6.4 - 8.2 g/dL    Albumin 3.6 3.5 - 5.0 g/dL    Globulin 4.1 (H) 2.0 - 4.0 g/dL    A-G Ratio 0.9 (L) 1.1 - 2.2     PROTHROMBIN TIME + INR    Collection Time: 03/05/21  4:50 PM   Result Value Ref Range    INR 1.1 0.9 - 1.1      Prothrombin time 11.8 (H) 9.0 - 11.1 sec   TYPE & SCREEN    Collection Time: 03/05/21  7:34 PM   Result Value Ref Range    Crossmatch Expiration 03/08/2021,2359     ABO/Rh(D) O POSITIVE     Antibody screen NEG    URINALYSIS W/ REFLEX CULTURE    Collection Time: 03/05/21  9:43 PM    Specimen: Urine   Result Value Ref Range    Color YELLOW/STRAW      Appearance CLEAR CLEAR      Specific gravity 1.010 1.003 - 1.030      pH (UA) 6.5 5.0 - 8.0      Protein Negative NEG mg/dL    Glucose 500 (A) NEG mg/dL    Ketone Negative NEG mg/dL    Bilirubin Negative NEG      Blood TRACE (A) NEG      Urobilinogen 0.2 0.2 - 1.0 EU/dL    Nitrites Negative NEG      Leukocyte Esterase Negative NEG      WBC 0-4 0 - 4 /hpf    RBC 5-10 0 - 5 /hpf    Epithelial cells FEW FEW /lpf    Bacteria Negative NEG /hpf    UA:UC IF INDICATED CULTURE NOT INDICATED BY UA RESULT CNI      Hyaline cast 0-2 0 - 5 /lpf   GLUCOSE, POC    Collection Time: 03/05/21 11:19 PM   Result Value Ref Range    Glucose (POC) 128 (H) 65 - 100 mg/dL    Performed by Melissa Crawford    METABOLIC PANEL, BASIC    Collection Time: 03/06/21  3:24 AM   Result Value Ref Range    Sodium 132 (L) 136 - 145 mmol/L    Potassium 5.9 (H) 3.5 - 5.1 mmol/L    Chloride 105 97 - 108 mmol/L    CO2 21 21 - 32 mmol/L    Anion gap 6 5 - 15 mmol/L    Glucose 156 (H) 65 - 100 mg/dL    BUN 26 (H) 6 - 20 MG/DL    Creatinine 2.62 (H) 0.70 - 1.30 MG/DL    BUN/Creatinine ratio 10 (L) 12 - 20      GFR est AA 28 (L) >60 ml/min/1.73m2    GFR est non-AA 23 (L) >60 ml/min/1.73m2    Calcium 8.4 (L) 8.5 - 10.1 MG/DL   MAGNESIUM    Collection Time: 03/06/21  3:24 AM   Result Value Ref Range    Magnesium 1.9 1.6 - 2.4 mg/dL   CBC WITH AUTOMATED DIFF    Collection Time: 03/06/21  3:24 AM   Result Value Ref Range    WBC 8.3 4.1 - 11.1 K/uL    RBC 3.11 (L) 4.10 - 5.70 M/uL    HGB 9.6 (L) 12.1 - 17.0 g/dL    HCT 29.3 (L) 36.6 - 50.3 %    MCV 94.2 80.0 - 99.0 FL    MCH 30.9 26.0 - 34.0 PG    MCHC 32.8 30.0 - 36.5 g/dL    RDW 12.8 11.5 - 14.5 %    PLATELET 423 (L) 883 - 400 K/uL    MPV 10.0 8.9 - 12.9 FL    NRBC 0.0 0  WBC    ABSOLUTE NRBC 0.00 0.00 - 0.01 K/uL    NEUTROPHILS 81 (H) 32 - 75 %    LYMPHOCYTES 12 12 - 49 %    MONOCYTES 6 5 - 13 %    EOSINOPHILS 0 0 - 7 %    BASOPHILS 0 0 - 1 %    IMMATURE GRANULOCYTES 1 (H) 0.0 - 0.5 %    ABS. NEUTROPHILS 6.7 1.8 - 8.0 K/UL    ABS. LYMPHOCYTES 1.0 0.8 - 3.5 K/UL    ABS. MONOCYTES 0.5 0.0 - 1.0 K/UL    ABS. EOSINOPHILS 0.0 0.0 - 0.4 K/UL    ABS. BASOPHILS 0.0 0.0 - 0.1 K/UL    ABS. IMM.  GRANS. 0.0 0.00 - 0.04 K/UL    DF AUTOMATED     NT-PRO BNP    Collection Time: 03/06/21  3:24 AM   Result Value Ref Range    NT pro- (H) <450 PG/ML   GLUCOSE, POC Collection Time: 03/06/21  6:48 AM   Result Value Ref Range    Glucose (POC) 167 (H) 65 - 100 mg/dL    Performed by Juliette Moon    GLUCOSE, POC    Collection Time: 03/06/21 11:07 AM   Result Value Ref Range    Glucose (POC) 135 (H) 65 - 100 mg/dL    Performed by Cristian Lloyd      Xr Chest Sngl V    Result Date: 3/5/2021   impression: No acute changes. Xr Hip Rt W Or Wo Pelv 2-3 Vws    Result Date: 3/5/2021   IMPRESSION: Fracture right hip. Ct Head Wo Cont    Result Date: 3/5/2021  No acute changes. Us Retroperitoneum Comp    Result Date: 3/5/2021  No hydronephrosis. Assessment/Plan:     R IT hip fx    Pain control  Bedrest until surgery  On heparin which will hold tonight at midnight in prep for surgery  We had a long discussion with patient and his son. Also discussed with medical team/cardiology. Plan will be for R hip IMN. Patient has been deemed intermediate risk. Cardiology would like echo today and plan for surgery in am.  Morbidity and mortality increases when surgery is delayed beyond 48 hrs for hip fx stabilization. We have weighed the risks and benefits and plan will be to proceed with surgery Sunday am if potassium level decreases and as long as nothing shows up on echo where cardiology thinks the patient could be further medically optimized. Family understands this plan and is agreeable. The risks of surgery were explained. Risks of infection, blood loss, neurovascular injury, anesthesia risks, and risks secondary to patient comorbidities were explained.     Npo after midnight      Tejinder Del Angel DO

## 2021-03-06 NOTE — CONSULTS
Subjective:      Date of  Admission: 3/5/2021  4:26 PM     Admission type:Emergency    Jackie Kevin is a 80 y.o. male admitted for Hip fracture ([facility] Cristina Pabon; Seizure ([facility] [R56.9]; LUCY (acute kidney injury) ([facility] [N17.9]; Hyperkalemia [E87.5]. Cardiology consult requested for pre op evaluation. Patient denies chest pain, chest pressure/discomfort, dyspnea, palpitations, irregular heart beats, near-syncope, syncope, fatigue, orthopnea, paroxysmal nocturnal dyspnea, exertional chest pressure/discomfort, claudication, lower extremity edema, tachypnea. Not very active at baseline. No previous cardiac history or work up. 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 ([facility] 09/05/2018    Depressive disorder 09/04/2018    TIA (transient ischemic attack) 07/24/2014      Gretchen Aj MD  Past Medical History:   Diagnosis Date    Diabetes [facility])     Gastrointestinal disorder     Hypertension     Other ill-defined conditions(799.89)     gout, glaucoma    Other ill-defined conditions(799.89)     high cholesterol      Past Surgical History:   Procedure Laterality Date    HX ORTHOPAEDIC      cervical surgery     Allergies   Allergen Reactions    Codeine Itching    Fentanyl Other (comments)     Questionable Sz with IV admin      History reviewed. No pertinent family history.    Current Facility-Administered Medications   Medication Dose Route Frequency    sodium zirconium cyclosilicate (LOKELMA) powder packet 15 g  15 g Oral NOW    sodium chloride (NS) flush 5-40 mL  5-40 mL IntraVENous Q8H    0.9% sodium chloride infusion  100 mL/hr IntraVENous CONTINUOUS    sodium chloride (NS) flush 5-40 mL  5-40 mL IntraVENous PRN    acetaminophen (TYLENOL) tablet 650 mg  650 mg Oral Q6H    naloxone (NARCAN) injection 0.4 mg  0.4 mg IntraVENous PRN    senna-docusate (PERICOLACE) 8.6-50 mg per tablet 2 Tab  2 Tab Oral BID    oxyCODONE IR (ROXICODONE) tablet 2.5 mg  2.5 mg Oral Q4H PRN    oxyCODONE IR (ROXICODONE) tablet 5 mg  5 mg Oral Q4H PRN    allopurinoL (ZYLOPRIM) tablet 100 mg  100 mg Oral DAILY    pregabalin (LYRICA) capsule 50 mg  50 mg Oral DAILY    traZODone (DESYREL) tablet 50 mg  50 mg Oral QHS PRN    latanoprost (XALATAN) 0.005 % ophthalmic solution 1 Drop  1 Drop Both Eyes QHS    cyanocobalamin (VITAMIN B12) tablet 1,000 mcg  1,000 mcg Oral DAILY    acetaminophen (TYLENOL) tablet 650 mg  650 mg Oral Q6H PRN    Or    acetaminophen (TYLENOL) suppository 650 mg  650 mg Rectal Q6H PRN    polyethylene glycol (MIRALAX) packet 17 g  17 g Oral DAILY PRN    promethazine (PHENERGAN) tablet 12.5 mg  12.5 mg Oral Q6H PRN    Or    ondansetron (ZOFRAN) injection 4 mg  4 mg IntraVENous Q6H PRN    heparin (porcine) injection 5,000 Units  5,000 Units SubCUTAneous Q8H    insulin lispro (HUMALOG) injection   SubCUTAneous AC&HS    glucose chewable tablet 16 g  4 Tab Oral PRN    dextrose (D50W) injection syrg 12.5-25 g  12.5-25 g IntraVENous PRN    glucagon (GLUCAGEN) injection 1 mg  1 mg IntraMUSCular PRN    levETIRAcetam (KEPPRA) 500 mg in saline (iso-osm) 100 mL IVPB (premix)  500 mg IntraVENous Q12H         Review of Symptoms:  Constitutional: negative  Eyes: negative  Ears, nose, mouth, throat, and face: negative  Respiratory: No exertional dyspnea, orthopnea, PND, cough, hemoptysis, URI. Cardiovascular: No CP, palpitations, sweating, lightheadedness, dizziness, syncope, presyncope, lower extremity swelling. Gastrointestinal: No nausea, vomiting, diarrhea, constipation, abdominal pain, hematemesis, melena, hematochezia  Genitourinary:No urinary complaints.   Musculoskeletal:negative  Neurological: negative  Behvioral/Psych: negative  Endocrine: negative     Subjective: Visit Vitals  /75 (BP 1 Location: Left upper arm, BP Patient Position: At rest)   Pulse 71   Temp 98.2 °F (36.8 °C)   Resp 18   Ht 5' 6\" (1.676 m)   Wt 169 lb 1.5 oz (76.7 kg)   SpO2 100%   BMI 27.29 kg/m²       Physical Exam  Resting comfortably. No resp distress  Abdomen: soft, non-tender. Bowel sounds normal.   Extremities: rt. Leg ext rotated. Heart: regular rate and rhythm, S1, S2 normal, no murmur, click, rub or gallop  Lungs: clear to auscultation bilaterally  Neck: supple, no carotid bruit and no JVD  Neurologic: Grossly normal  Pulses: 2+ and symmetric    Cardiographics    Telemetry: SR    ECG: SR    Echocardiogram: Not done    Labs:   Recent Results (from the past 24 hour(s))   EKG, 12 LEAD, INITIAL    Collection Time: 03/05/21  4:47 PM   Result Value Ref Range    Ventricular Rate 60 BPM    Atrial Rate 60 BPM    P-R Interval 240 ms    QRS Duration 72 ms    Q-T Interval 424 ms    QTC Calculation (Bezet) 424 ms    Calculated P Axis 45 degrees    Calculated R Axis 7 degrees    Calculated T Axis 21 degrees    Diagnosis       Sinus rhythm with 1st degree AV block  When compared with ECG of 12-MAY-2018 15:36,  PA interval has increased  QRS axis shifted right  T wave inversion no longer evident in Lateral leads     CBC WITH AUTOMATED DIFF    Collection Time: 03/05/21  4:50 PM   Result Value Ref Range    WBC 5.4 4.1 - 11.1 K/uL    RBC 3.80 (L) 4.10 - 5.70 M/uL    HGB 11.7 (L) 12.1 - 17.0 g/dL    HCT 35.4 (L) 36.6 - 50.3 %    MCV 93.2 80.0 - 99.0 FL    MCH 30.8 26.0 - 34.0 PG    MCHC 33.1 30.0 - 36.5 g/dL    RDW 12.8 11.5 - 14.5 %    PLATELET 619 221 - 761 K/uL    MPV 9.8 8.9 - 12.9 FL    NRBC 0.0 0  WBC    ABSOLUTE NRBC 0.00 0.00 - 0.01 K/uL    NEUTROPHILS 48 32 - 75 %    LYMPHOCYTES 36 12 - 49 %    MONOCYTES 11 5 - 13 %    EOSINOPHILS 4 0 - 7 %    BASOPHILS 1 0 - 1 %    IMMATURE GRANULOCYTES 0 0.0 - 0.5 %    ABS. NEUTROPHILS 2.6 1.8 - 8.0 K/UL    ABS. LYMPHOCYTES 2.0 0.8 - 3.5 K/UL    ABS. MONOCYTES 0.6 0.0 - 1.0 K/UL    ABS. EOSINOPHILS 0.2 0.0 - 0.4 K/UL    ABS. BASOPHILS 0.0 0.0 - 0.1 K/UL    ABS. IMM. GRANS. 0.0 0.00 - 0.04 K/UL    DF AUTOMATED     METABOLIC PANEL, COMPREHENSIVE    Collection Time: 03/05/21  4:50 PM   Result Value Ref Range    Sodium 134 (L) 136 - 145 mmol/L    Potassium 5.7 (H) 3.5 - 5.1 mmol/L    Chloride 104 97 - 108 mmol/L    CO2 27 21 - 32 mmol/L    Anion gap 3 (L) 5 - 15 mmol/L    Glucose 84 65 - 100 mg/dL    BUN 26 (H) 6 - 20 MG/DL    Creatinine 2.83 (H) 0.70 - 1.30 MG/DL    BUN/Creatinine ratio 9 (L) 12 - 20      GFR est AA 26 (L) >60 ml/min/1.73m2    GFR est non-AA 21 (L) >60 ml/min/1.73m2    Calcium 8.9 8.5 - 10.1 MG/DL    Bilirubin, total 0.4 0.2 - 1.0 MG/DL    ALT (SGPT) 24 12 - 78 U/L    AST (SGOT) 29 15 - 37 U/L    Alk.  phosphatase 160 (H) 45 - 117 U/L    Protein, total 7.7 6.4 - 8.2 g/dL    Albumin 3.6 3.5 - 5.0 g/dL    Globulin 4.1 (H) 2.0 - 4.0 g/dL    A-G Ratio 0.9 (L) 1.1 - 2.2     PROTHROMBIN TIME + INR    Collection Time: 03/05/21  4:50 PM   Result Value Ref Range    INR 1.1 0.9 - 1.1      Prothrombin time 11.8 (H) 9.0 - 11.1 sec   TYPE & SCREEN    Collection Time: 03/05/21  7:34 PM   Result Value Ref Range    Crossmatch Expiration 03/08/2021,2359     ABO/Rh(D) O POSITIVE     Antibody screen NEG    URINALYSIS W/ REFLEX CULTURE    Collection Time: 03/05/21  9:43 PM    Specimen: Urine   Result Value Ref Range    Color YELLOW/STRAW      Appearance CLEAR CLEAR      Specific gravity 1.010 1.003 - 1.030      pH (UA) 6.5 5.0 - 8.0      Protein Negative NEG mg/dL    Glucose 500 (A) NEG mg/dL    Ketone Negative NEG mg/dL    Bilirubin Negative NEG      Blood TRACE (A) NEG      Urobilinogen 0.2 0.2 - 1.0 EU/dL    Nitrites Negative NEG      Leukocyte Esterase Negative NEG      WBC 0-4 0 - 4 /hpf    RBC 5-10 0 - 5 /hpf    Epithelial cells FEW FEW /lpf    Bacteria Negative NEG /hpf    UA:UC IF INDICATED CULTURE NOT INDICATED BY UA RESULT CNI      Hyaline cast 0-2 0 - 5 /lpf GLUCOSE, POC    Collection Time: 03/05/21 11:19 PM   Result Value Ref Range    Glucose (POC) 128 (H) 65 - 100 mg/dL    Performed by Bonny Lundy    METABOLIC PANEL, BASIC    Collection Time: 03/06/21  3:24 AM   Result Value Ref Range    Sodium 132 (L) 136 - 145 mmol/L    Potassium 5.9 (H) 3.5 - 5.1 mmol/L    Chloride 105 97 - 108 mmol/L    CO2 21 21 - 32 mmol/L    Anion gap 6 5 - 15 mmol/L    Glucose 156 (H) 65 - 100 mg/dL    BUN 26 (H) 6 - 20 MG/DL    Creatinine 2.62 (H) 0.70 - 1.30 MG/DL    BUN/Creatinine ratio 10 (L) 12 - 20      GFR est AA 28 (L) >60 ml/min/1.73m2    GFR est non-AA 23 (L) >60 ml/min/1.73m2    Calcium 8.4 (L) 8.5 - 10.1 MG/DL   MAGNESIUM    Collection Time: 03/06/21  3:24 AM   Result Value Ref Range    Magnesium 1.9 1.6 - 2.4 mg/dL   CBC WITH AUTOMATED DIFF    Collection Time: 03/06/21  3:24 AM   Result Value Ref Range    WBC 8.3 4.1 - 11.1 K/uL    RBC 3.11 (L) 4.10 - 5.70 M/uL    HGB 9.6 (L) 12.1 - 17.0 g/dL    HCT 29.3 (L) 36.6 - 50.3 %    MCV 94.2 80.0 - 99.0 FL    MCH 30.9 26.0 - 34.0 PG    MCHC 32.8 30.0 - 36.5 g/dL    RDW 12.8 11.5 - 14.5 %    PLATELET 194 (L) 474 - 400 K/uL    MPV 10.0 8.9 - 12.9 FL    NRBC 0.0 0  WBC    ABSOLUTE NRBC 0.00 0.00 - 0.01 K/uL    NEUTROPHILS 81 (H) 32 - 75 %    LYMPHOCYTES 12 12 - 49 %    MONOCYTES 6 5 - 13 %    EOSINOPHILS 0 0 - 7 %    BASOPHILS 0 0 - 1 %    IMMATURE GRANULOCYTES 1 (H) 0.0 - 0.5 %    ABS. NEUTROPHILS 6.7 1.8 - 8.0 K/UL    ABS. LYMPHOCYTES 1.0 0.8 - 3.5 K/UL    ABS. MONOCYTES 0.5 0.0 - 1.0 K/UL    ABS. EOSINOPHILS 0.0 0.0 - 0.4 K/UL    ABS. BASOPHILS 0.0 0.0 - 0.1 K/UL    ABS. IMM.  GRANS. 0.0 0.00 - 0.04 K/UL    DF AUTOMATED     NT-PRO BNP    Collection Time: 03/06/21  3:24 AM   Result Value Ref Range    NT pro- (H) <450 PG/ML   GLUCOSE, POC    Collection Time: 03/06/21  6:48 AM   Result Value Ref Range    Glucose (POC) 167 (H) 65 - 100 mg/dL    Performed by Mercedez Hess         Assessment:     Assessment:       Active Problems:    Hyperkalemia (3/5/2021)      Hip fracture ([facility] (3/5/2021)      Seizure ([facility] (3/5/2021)      LUCY (acute kidney injury) ([facility] (3/5/2021)         Plan:     1. Pre op evaluation: intermediate risk surgery. Baseline poor functional status. Recommend leximyoview prior to surgery. If normal then can proceed. F/u Echo. If surgery has to be done urgently over weekend then can proceed, otherwise will get trell on Monday. 2. Follow Cr.   3. BP controlled.

## 2021-03-06 NOTE — PROGRESS NOTES
Day 0 note    Patient admitted overnight after ground-level fall and found to have a right intertrochanteric hip fracture. Deemed surgical procedure for cardiac complications. Cardiology consulted for further input and is recommended Catrina Myoview prior to surgery. Echo was ordered and pending. Patient does have CKD and remains hyperkalemic after initially receiving Kayexalate. Patient has been seen by nephrology and given additional potassium binder. If potassium improved in the a.m. and surgery felt urgent, reasonable to proceed tomorrow 3/7 as planned per surgical notes.

## 2021-03-06 NOTE — ED PROVIDER NOTES
EMERGENCY DEPARTMENT HISTORY AND PHYSICAL EXAM      Date: 3/5/2021  Patient Name: Seth Galvez    History of Presenting Illness     Chief Complaint   Patient presents with   [address] [facility] [address]    Hip Pain       History Provided By: Patient, Patient's Son and EMS    HPI: Seth Galvez, 80 y.o. male presents to the ED with cc of right hip pain. Pt presents to the ED by EMS for evaluation of hip pain following GLF. Pt states he lost his balance and fell landing on his right hip. Pt states pain in right hip rated 9/10 worsened with any movement. Pain is dull ache. He de states he did hit the back of his head, but there was no loss of consciousness. He denies any neck or back pain. He denies any recent illness including fever/chills. He denies any CP or SOA. He denies any recent abd pain, n/v/d. There has been no urinary symptoms. There are no other complaints, changes, or physical findings at this time. PCP: Claudene Elm, MD    No current facility-administered medications on file prior to encounter. Current Outpatient Medications on File Prior to Encounter   Medication Sig Dispense Refill    LYRICA 50 mg capsule TAKE 1 CAPSULE BY MOUTH TWICE DAILY  5    FREESTYLE LAVINIA 14 DAY SENSOR kit USE AS DIRECTED  12    allopurinol (ZYLOPRIM) 100 mg tablet TAKE 1 TABLET BY MOUTH ONCE DAILY  7    LUMIGAN 0.01 % ophthalmic drops INSTILL 1 DROP INTO EACH EYE ONCE DAILY AT BEDTIME  3    cyanocobalamin (VITAMIN B-12) 1,000 mcg tablet Take 1,000 mcg by mouth daily.  SITagliptin (JANUVIA) 100 mg tablet Take 100 mg by mouth daily.  traZODone (DESYREL) 50 mg tablet Take 1 Tab by mouth nightly as needed for Sleep.  Indications: insomnia associated with depression 14 Tab 0       Past History     Past Medical History:  Past Medical History:   Diagnosis Date    Diabetes ([facility] [address]     Gastrointestinal disorder     Hypertension     Other ill-defined conditions(979.21)     gout, glaucoma    Other ill-defined conditions(799.89)     high cholesterol       Past Surgical History:  Past Surgical History:   Procedure Laterality Date    HX ORTHOPAEDIC      cervical surgery       Family History:  History reviewed. No pertinent family history. Social History:  Social History     Tobacco Use    Smoking status: Never Smoker    Smokeless tobacco: Never Used   Substance Use Topics    Alcohol use: No     Comment: quit    Drug use: No       Allergies: Allergies   Allergen Reactions    Codeine Itching    Fentanyl Other (comments)     Questionable Sz with IV admin         Review of Systems   Review of Systems   Constitutional: Negative. Negative for appetite change, chills, fatigue and fever. HENT: Negative. Negative for congestion, rhinorrhea, sinus pressure and sore throat. Eyes: Negative. Respiratory: Negative. Negative for cough, choking, chest tightness, shortness of breath and wheezing. Cardiovascular: Negative. Negative for chest pain, palpitations and leg swelling. Gastrointestinal: Negative for abdominal pain, constipation, diarrhea, nausea and vomiting. Endocrine: Negative. Genitourinary: Negative. Negative for difficulty urinating, dysuria, flank pain and urgency. Musculoskeletal: Positive for arthralgias (Right hip pain). Skin: Negative. Neurological: Negative. Negative for dizziness, speech difficulty, weakness, light-headedness, numbness and headaches. Struck back of his head   Psychiatric/Behavioral: Negative. All other systems reviewed and are negative. Physical Exam   Physical Exam  Vitals signs and nursing note reviewed. Constitutional:       General: He is not in acute distress. Appearance: He is well-developed. He is not diaphoretic. Comments: Appears very uncomfortable   HENT:      Head: Normocephalic. Comments: Posterior scalp hematoma     Mouth/Throat:      Mouth: Mucous membranes are moist.      Pharynx: No oropharyngeal exudate.    Eyes: Extraocular Movements: Extraocular movements intact. Conjunctiva/sclera: Conjunctivae normal.      Pupils: Pupils are equal, round, and reactive to light. Neck:      Musculoskeletal: Normal range of motion and neck supple. No muscular tenderness. Vascular: No JVD. Trachea: No tracheal deviation. Cardiovascular:      Rate and Rhythm: Normal rate and regular rhythm. Heart sounds: Normal heart sounds. No murmur. Pulmonary:      Effort: Pulmonary effort is normal. No respiratory distress. Breath sounds: Normal breath sounds. No stridor. No wheezing or rales. Abdominal:      General: There is no distension. Palpations: Abdomen is soft. Tenderness: There is no abdominal tenderness. There is no guarding or rebound. Musculoskeletal:         General: Tenderness present. Right lower leg: Edema present. Left lower leg: Edema present. Comments: Right hip shortened, externally rotated, + pain with palpation, distal PMS intact, no ttp below hip, neg C/T/L spine tenderness   Skin:     General: Skin is warm and dry. Neurological:      Mental Status: He is alert and oriented to person, place, and time. Cranial Nerves: No cranial nerve deficit.       Comments: No gross motor or sensory deficits    Psychiatric:         Behavior: Behavior normal.         Diagnostic Study Results     Labs -     Recent Results (from the past 12 hour(s))   EKG, 12 LEAD, INITIAL    Collection Time: 03/05/21  4:47 PM   Result Value Ref Range    Ventricular Rate 60 BPM    Atrial Rate 60 BPM    P-R Interval 240 ms    QRS Duration 72 ms    Q-T Interval 424 ms    QTC Calculation (Bezet) 424 ms    Calculated P Axis 45 degrees    Calculated R Axis 7 degrees    Calculated T Axis 21 degrees    Diagnosis       Sinus rhythm with 1st degree AV block  When compared with ECG of 12-MAY-2018 15:36,  [place] interval has increased  QRS axis shifted right  T wave inversion no longer evident in Lateral leads CBC WITH AUTOMATED DIFF    Collection Time: 03/05/21  4:50 PM   Result Value Ref Range    WBC 5.4 4.1 - 11.1 K/uL    RBC 3.80 (L) 4.10 - 5.70 M/uL    HGB 11.7 (L) 12.1 - 17.0 g/dL    HCT 35.4 (L) 36.6 - 50.3 %    MCV 93.2 80.0 - 99.0 FL    MCH 30.8 26.0 - 34.0 PG    MCHC 33.1 30.0 - 36.5 g/dL    RDW 12.8 11.5 - 14.5 %    PLATELET 953 064 - 285 K/uL    MPV 9.8 8.9 - 12.9 FL    NRBC 0.0 0  WBC    ABSOLUTE NRBC 0.00 0.00 - 0.01 K/uL    NEUTROPHILS 48 32 - 75 %    LYMPHOCYTES 36 12 - 49 %    MONOCYTES 11 5 - 13 %    EOSINOPHILS 4 0 - 7 %    BASOPHILS 1 0 - 1 %    IMMATURE GRANULOCYTES 0 0.0 - 0.5 %    ABS. NEUTROPHILS 2.6 1.8 - 8.0 K/UL    ABS. LYMPHOCYTES 2.0 0.8 - 3.5 K/UL    ABS. MONOCYTES 0.6 0.0 - 1.0 K/UL    ABS. EOSINOPHILS 0.2 0.0 - 0.4 K/UL    ABS. BASOPHILS 0.0 0.0 - 0.1 K/UL    ABS. IMM. GRANS. 0.0 0.00 - 0.04 K/UL    DF AUTOMATED     METABOLIC PANEL, COMPREHENSIVE    Collection Time: 03/05/21  4:50 PM   Result Value Ref Range    Sodium 134 (L) 136 - 145 mmol/L    Potassium 5.7 (H) 3.5 - 5.1 mmol/L    Chloride 104 97 - 108 mmol/L    CO2 27 21 - 32 mmol/L    Anion gap 3 (L) 5 - 15 mmol/L    Glucose 84 65 - 100 mg/dL    BUN 26 (H) 6 - 20 MG/DL    Creatinine 2.83 (H) 0.70 - 1.30 MG/DL    BUN/Creatinine ratio 9 (L) 12 - 20      GFR est AA 26 (L) >60 ml/min/1.73m2    GFR est non-AA 21 (L) >60 ml/min/1.73m2    Calcium 8.9 8.5 - 10.1 MG/DL    Bilirubin, total 0.4 0.2 - 1.0 MG/DL    ALT (SGPT) 24 12 - 78 U/L    AST (SGOT) 29 15 - 37 U/L    Alk.  phosphatase 160 (H) 45 - 117 U/L    Protein, total 7.7 6.4 - 8.2 g/dL    Albumin 3.6 3.5 - 5.0 g/dL    Globulin 4.1 (H) 2.0 - 4.0 g/dL    A-G Ratio 0.9 (L) 1.1 - 2.2     PROTHROMBIN TIME + INR    Collection Time: 03/05/21  4:50 PM   Result Value Ref Range    INR 1.1 0.9 - 1.1      Prothrombin time 11.8 (H) 9.0 - 11.1 sec   TYPE & SCREEN    Collection Time: 03/05/21  7:34 PM   Result Value Ref Range    Crossmatch Expiration 03/08/2021,2359     ABO/Rh(D) [place] POSITIVE Antibody screen NEG    URINALYSIS W/ REFLEX CULTURE    Collection Time: 03/05/21  9:43 PM    Specimen: Urine   Result Value Ref Range    Color YELLOW/STRAW      Appearance CLEAR CLEAR      Specific gravity 1.010 1.003 - 1.030      pH (UA) 6.5 5.0 - 8.0      Protein Negative NEG mg/dL    Glucose 500 (A) NEG mg/dL    Ketone Negative NEG mg/dL    Bilirubin Negative NEG      Blood TRACE (A) NEG      Urobilinogen 0.2 0.2 - 1.0 EU/dL    Nitrites Negative NEG      Leukocyte Esterase Negative NEG      WBC 0-4 0 - 4 /hpf    RBC 5-10 0 - 5 /hpf    Epithelial cells FEW FEW /lpf    Bacteria Negative NEG /hpf    UA:UC IF INDICATED CULTURE NOT INDICATED BY UA RESULT CNI      Hyaline cast 0-2 0 - 5 /lpf   GLUCOSE, POC    Collection Time: 03/05/21 11:19 PM   Result Value Ref Range    Glucose (POC) 128 (H) 65 - 100 mg/dL    Performed by EATONrt PANEL, BASIC    Collection Time: 03/06/21  3:24 AM   Result Value Ref Range    Sodium 132 (L) 136 - 145 mmol/L    Potassium 5.9 (H) 3.5 - 5.1 mmol/L    Chloride 105 97 - 108 mmol/L    CO2 21 21 - 32 mmol/L    Anion gap 6 5 - 15 mmol/L    Glucose 156 (H) 65 - 100 mg/dL    BUN 26 (H) 6 - 20 MG/DL    Creatinine 2.62 (H) 0.70 - 1.30 MG/DL    BUN/Creatinine ratio 10 (L) 12 - 20      GFR est AA 28 (L) >60 ml/min/1.73m2    GFR est non-AA 23 (L) >60 ml/min/1.73m2    Calcium 8.4 (L) 8.5 - 10.1 MG/DL   MAGNESIUM    Collection Time: 03/06/21  3:24 AM   Result Value Ref Range    Magnesium 1.9 1.6 - 2.4 mg/dL   CBC WITH AUTOMATED DIFF    Collection Time: 03/06/21  3:24 AM   Result Value Ref Range    WBC 8.3 4.1 - 11.1 K/uL    RBC 3.11 (L) 4.10 - 5.70 M/uL    HGB 9.6 (L) 12.1 - 17.0 g/dL    HCT 29.3 (L) 36.6 - 50.3 %    MCV 94.2 80.0 - 99.0 FL    MCH 30.9 26.0 - 34.0 PG    MCHC 32.8 30.0 - 36.5 g/dL    RDW 12.8 11.5 - 14.5 %    PLATELET 799 (L) 429 - 400 K/uL    MPV 10.0 8.9 - 12.9 FL    NRBC 0.0 0  WBC    ABSOLUTE NRBC 0.00 0.00 - 0.01 K/uL    NEUTROPHILS 81 (H) 32 - 75 % LYMPHOCYTES 12 12 - 49 %    MONOCYTES 6 5 - 13 %    EOSINOPHILS 0 0 - 7 %    BASOPHILS 0 0 - 1 %    IMMATURE GRANULOCYTES 1 (H) 0.0 - 0.5 %    ABS. NEUTROPHILS 6.7 1.8 - 8.0 K/UL    ABS. LYMPHOCYTES 1.0 0.8 - 3.5 K/UL    ABS. MONOCYTES 0.5 0.0 - 1.0 K/UL    ABS. EOSINOPHILS 0.0 0.0 - 0.4 K/UL    ABS. BASOPHILS 0.0 0.0 - 0.1 K/UL    ABS. IMM. GRANS. 0.0 0.00 - 0.04 K/UL    DF AUTOMATED     NT-PRO BNP    Collection Time: 03/06/21  3:24 AM   Result Value Ref Range    NT pro- (H) <450 PG/ML       Radiologic Studies -   US RETROPERITONEUM COMP   Final Result   No hydronephrosis. CT HEAD WO CONT   Final Result   No acute changes. XR HIP RT W OR WO PELV 2-3 VWS   Final Result    IMPRESSION:    Fracture right hip. XR CHEST SNGL V   Final Result    impression: No acute changes. CT Results  (Last 48 hours)               03/05/21 1924  CT HEAD WO CONT Final result    Impression:  No acute changes. Narrative:  EXAM: CT HEAD WO CONT       INDICATION: GLF, CHI       COMPARISON: June 2018. CONTRAST: None. TECHNIQUE: Unenhanced CT of the head was performed using 5 mm images. Brain and   bone windows were generated. Coronal and sagittal reformats. CT dose reduction   was achieved through use of a standardized protocol tailored for this   examination and automatic exposure control for dose modulation. FINDINGS:   Mild atrophy and white matter disease. No extra-axial fluid collection, hemorrhage shift or masses. Small remote right basal ganglia lacune. No mass. Mucosal thickening sphenoid sinus stable. CXR Results  (Last 48 hours)               03/05/21 1707  XR CHEST SNGL V Final result    Impression:   impression: No acute changes. Narrative:  Clinical indication: Preop. AP semierect view of the chest is obtained, comparison September 4, 2018. The    heart size is normal. There is no acute infiltrate.                  Medical Decision Making   I am the first provider for this patient. I reviewed the vital signs, available nursing notes, past medical history, past surgical history, family history and social history. Vital Signs-Reviewed the patient's vital signs. Patient Vitals for the past 12 hrs:   Temp Pulse Resp BP SpO2   03/06/21 0326 98.3 °F (36.8 °C) 71 18 126/73 94 %   03/05/21 2306 98.1 °F (36.7 °C) 63 20 131/87 98 %   03/05/21 2100   26 (!) 166/79 97 %   03/05/21 2057 98.8 °F (37.1 °C) (!) 59 16 (!) 166/79 100 %   03/05/21 1850  73 16 (!) 142/68 99 %   03/05/21 1800  67 16 (!) 172/90 98 %   03/05/21 1713  63 14 (!) 183/89 97 %   03/05/21 1631 98.2 °F (36.8 °C) 69 20 (!) 170/97 100 %       EKG interpretation: (Preliminary)  Sinus, 1st degree AV lock, rate 60, normal axis/qrs, no acute ST changes    Records Reviewed: Nursing Notes, Old Medical Records, Ambulance Run Sheet, Previous Radiology Studies and Previous Laboratory Studies    Provider Notes (Medical Decision Making):   DDx- Right hip fx, pelvic fx, CHI, ICH,     ED Course:   Initial assessment performed. The patients presenting problems have been discussed, and they are in agreement with the care plan formulated and outlined with them. I have encouraged them to ask questions as they arise throughout their visit. Pt initially medicated with Morphine, with improvement in pain. Xray shows hip fx, will consult ortho/Hosp. Consult:   Case discussed with Elisa Madrid NP. Admit to hospitalist, ortho will follow. Following Xray pt had increase in pain, fentanyl ordered. A few minutes after Fentanyl called to room by nursing staff concern for sz. Arrived pt with tonus of upper ext bilateral, ? Eye gaze to left, with some facial twisting. Pt with no prior hx of sz. Pt at this time was able to answer questions. Concern for partial sz, given Fentanyl don't know if this contributed, ?  Acute stiff chest. Fentanyl had been ordered given shorter half life, and PO valium ordered as likely to have its effect in 30-45 mins, however this had not been given. Pt given Versed 2mg with good results from a stand point of activity, Following Versed pt had drop in O2 sats. Pt would respond to physical stimuli. Pt then had further drop in O2 sats, I began assisting ventilation along with chin lift, Code Cart called for and ordered Narcan. O2 sats improved, following Narcan pt began to respond and breathing improved. This may be due to Narcan effect, also possibility that he was improving clinically from sz. Pt placed on O2 by NC. Will have Fentanyl added to drug allergy- Acute chest v. Adverse effect. Pt taken to CT by nursing and I also went with pt to view CT in real-time. No hemorrhage seen. Son at the bedside during initial resp event, discussed with him possible causes. Consult:  Case discussed with Dr. Kwaku Gonzalez. Pt will be evaluated and admitted. Critical Care Time:   CRITICAL CARE NOTE :    IMPENDING DETERIORATION -Respiratory, Cardiovascular, CNS, Metabolic and Renal  ASSOCIATED RISK FACTORS - CNS Decompensation and Hip fx, Episode of acute resp failure  MANAGEMENT- Bedside Assessment and Supervision of Care  INTERPRETATION -  Xrays, CT Scan, ECG, Blood Pressure, Cardiac Output Measures  and Labs  INTERVENTIONS - IV fluids, pain meds, Versed  CASE REVIEW - Hospitalist/Intensivist, Medical Sub-Specialist, Nursing and Family  TREATMENT RESPONSE -Stable  PERFORMED BY - Self    NOTES   :  I have spent 40 minutes of critical care time involved in lab review, consultations with specialist, family decision- making, bedside attention and documentation. This time excludes time spent in any separate billed procedures. During this entire length of time I was immediately available to the patient . Marisela Phillip DO      Disposition:  Admit      Diagnosis     Clinical Impression:   1. Closed fracture of right hip, initial encounter ([facility] [address]    2.  Acute renal failure, unspecified acute renal failure type ([facility] [address]    3. Closed head injury, initial encounter        Attestations:    Cristin Romero, DO    Please note that this dictation was completed with [employer], the computer voice recognition software. Quite often unanticipated grammatical, syntax, homophones, and other interpretive errors are inadvertently transcribed by the computer software. Please disregard these errors. Please excuse any errors that have escaped final proofreading. Thank you.

## 2021-03-06 NOTE — PROGRESS NOTES
Dictated  Has [address] hx of ckd  Never seen by us. ... and he doesn't know year let alone any prev renal eval    - veltassa  - chems 1300  - pvr    If this is baseline neuro cognititve fx would not proceed with tonia w/u - see what MS does over next day or so

## 2021-03-06 NOTE — ANESTHESIA PREPROCEDURE EVALUATION
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)   (+) Seizure (HCC)   (+) TIA (transient ischemic attack)      RENAL FAILURE   (+) LUCY (acute kidney injury) ([facility] [address]       Anesthetic History   No history of anesthetic complications            Review of Systems / Medical History  Patient summary reviewed, nursing notes reviewed and pertinent labs reviewed    Pulmonary                   Neuro/Psych     seizures  CVA  TIA and psychiatric history    Comments: Acute Seizure secondary to fentanyl use - now stable (3/6/21)    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     Neuropathy Cardiovascular    Hypertension          Hyperlipidemia    Exercise tolerance: <4 METS  Comments: ECG (3/5/21): Sinus rhythm with 1st degree AV block   When compared with ECG of 12-MAY-2018 15:36,   [place] interval has increased   QRS axis shifted right   T wave inversion no longer evident in Lateral leads    GI/Hepatic/Renal         Renal disease: CRI and ARF      Comments: CRI, Stage IV Endo/Other    Diabetes: type 2    Anemia    Comments:  Thrombocytopenia Other Findings   Comments: Right Hip Fracture    Gout  Glaucoma    Hgb 9.6         Physical Exam    Airway  Mallampati: III  TM Distance: 4 - 6 cm  Neck ROM: normal range of motion   Mouth opening: Normal     Cardiovascular  Regular rate and rhythm,  S1 and S2 normal,  no murmur, click, rub, or gallop             Dental    Dentition: Edentulous     Pulmonary  Breath sounds clear to auscultation               Abdominal  GI exam deferred       Other Findings            Anesthetic Plan    ASA: 3  Anesthesia type: general    Monitoring Plan: BIS      Induction: Intravenous  Anesthetic plan and risks discussed with: Patient

## 2021-03-06 NOTE — CONSULTS
[id number]    Name:  Ameya Sands  MR#:  [id number]  :  10/06/1932  ACCOUNT #:  [de-identified]  DATE OF SERVICE:  2021    REASON FOR CONSULTATION:  Elevated serum creatinine. HISTORY OF PRESENT ILLNESS:  An 19-year-old [facility] American male with following medial problems:  1. Confusion. 2.  Diabetes mellitus. 3.  Hypertension. 4.  Chronic kidney disease with elevated serum creatinines dating back to at least 2018.  5.  Hip fracture. I was asked to see the patient regarding an elevated serum creatinine and hyperkalemia. Today, serum creatinine 2.62 (improved), serum potassium 5.9 (mEq/L). The patient is confused, does not know his whereabouts, does not know year, and cannot provide a reasonable history. He presented to hospital yesterday after a ground level fall and was found to have a right hip fracture. Apparently, he has a history of seizure disorder, hypertension, diabetes mellitus, and by review of the computerized database, serum creatinine levels which are elevated going back many years. In fact, his serum creatinine in 2012 was measured 2.0 mEq/L. The database does not reveal any previous evaluation by Nephrology. He is unable to relate to me whether or not he has been seeing a Nephrology in the outpatient setting, his name is not contained in our database. CURRENT MEDICATIONS:  Of note include, acetaminophen, allopurinol, insulin, Keppra, Lyrica, normal saline infusion at 100 mL/hour. SOCIAL HISTORY:  Unobtainable. FAMILY HISTORY:  Unobtainable. REVIEW OF SYSTEMS:  Unobtainable. PHYSICAL EXAMINATION:  GENERAL:  A confused elderly male in bed. VITAL SIGNS:  Most recent blood pressure documented is 108/75, pulse 71, oxygen saturations 100%, temperature 98. 2. HEAD:  Normocephalic. EYES:  No scleral icterus. NECK:  Appears supple. LUNGS:  Clear to auscultation. CARDIOVASCULAR:  No pericardial friction rub.   ABDOMEN: Nondistended. GI:  Urinary output is 1560. NEUROLOGIC:  As described above. PSYCH:  He is calm. LABORATORY DATA:  Labs of note,  sodium 132, potassium 5.9, chloride and bicarb of 105 and 21 with a BUN and creatinine of 26 and 2.62. His white blood cell count 8.3, hematocrit 29%, platelet count is 180,779. Renal ultrasound shows right and left kidneys measuring 7.4 and 8.4 cm respectively, no evidence of hydronephrosis, bladder completely distended. ASSESSMENT AND PLAN:  The patient in fact has chronic kidney disease. He has hyperkalemia, however, which needs to be further treated. I am told he received sodium polystyrene yesterday. His potassium has risen a bit. His urinary output is reasonable. I have ordered some Veltassa and a followup set of chemistries to be obtained in approximately four hours. Bladder scan. He will need a full evaluation of chronic kidney disease, but appears quite confused. I am not sure this is baseline and I think the immediate concern is repair of his hip, which hopefully we can expedite with improvement in his hyperkalemia. Thanks for the consult.         MD KRISHNA Valdez/[email or web address]  D:  03/06/2021 8:34  T:  03/06/2021 13:47  JOB #:  [id number]

## 2021-03-06 NOTE — PROGRESS NOTES
0600 Pt confused throughout night. Could not get consent for surgery due to patient's orientation status.

## 2021-03-07 ENCOUNTER — APPOINTMENT (OUTPATIENT)
Dept: GENERAL RADIOLOGY | Age: 86
DRG: 481 | End: 2021-03-07
Attending: ORTHOPAEDIC SURGERY
Payer: MEDICARE

## 2021-03-07 LAB
ALBUMIN SERPL-MCNC: 3 G/DL (ref 3.5–5)
ANION GAP SERPL CALC-SCNC: 5 MMOL/L (ref 5–15)
ANION GAP SERPL CALC-SCNC: 6 MMOL/L (ref 5–15)
BUN SERPL-MCNC: 25 MG/DL (ref 6–20)
BUN SERPL-MCNC: 25 MG/DL (ref 6–20)
BUN/CREAT SERPL: 10 (ref 12–20)
BUN/CREAT SERPL: 10 (ref 12–20)
CALCIUM SERPL-MCNC: 8 MG/DL (ref 8.5–10.1)
CALCIUM SERPL-MCNC: 8 MG/DL (ref 8.5–10.1)
CHLORIDE SERPL-SCNC: 105 MMOL/L (ref 97–108)
CHLORIDE SERPL-SCNC: 107 MMOL/L (ref 97–108)
CO2 SERPL-SCNC: 23 MMOL/L (ref 21–32)
CO2 SERPL-SCNC: 24 MMOL/L (ref 21–32)
CREAT SERPL-MCNC: 2.4 MG/DL (ref 0.7–1.3)
CREAT SERPL-MCNC: 2.4 MG/DL (ref 0.7–1.3)
GLUCOSE BLD STRIP.AUTO-MCNC: 104 MG/DL (ref 65–100)
GLUCOSE BLD STRIP.AUTO-MCNC: 104 MG/DL (ref 65–100)
GLUCOSE BLD STRIP.AUTO-MCNC: 106 MG/DL (ref 65–100)
GLUCOSE BLD STRIP.AUTO-MCNC: 111 MG/DL (ref 65–100)
GLUCOSE BLD STRIP.AUTO-MCNC: 112 MG/DL (ref 65–100)
GLUCOSE BLD STRIP.AUTO-MCNC: 99 MG/DL (ref 65–100)
GLUCOSE SERPL-MCNC: 99 MG/DL (ref 65–100)
GLUCOSE SERPL-MCNC: 99 MG/DL (ref 65–100)
PHOSPHATE SERPL-MCNC: 3.6 MG/DL (ref 2.6–4.7)
POTASSIUM SERPL-SCNC: 4 MMOL/L (ref 3.5–5.1)
POTASSIUM SERPL-SCNC: 4.1 MMOL/L (ref 3.5–5.1)
SERVICE CMNT-IMP: ABNORMAL
SERVICE CMNT-IMP: NORMAL
SODIUM SERPL-SCNC: 134 MMOL/L (ref 136–145)
SODIUM SERPL-SCNC: 136 MMOL/L (ref 136–145)

## 2021-03-07 PROCEDURE — 65660000001 HC RM ICU INTERMED STEPDOWN

## 2021-03-07 PROCEDURE — 77030008684 HC TU ET CUF COVD -B: Performed by: ANESTHESIOLOGY

## 2021-03-07 PROCEDURE — 80048 BASIC METABOLIC PNL TOTAL CA: CPT

## 2021-03-07 PROCEDURE — C1769 GUIDE WIRE: HCPCS | Performed by: ORTHOPAEDIC SURGERY

## 2021-03-07 PROCEDURE — 2709999900 HC NON-CHARGEABLE SUPPLY: Performed by: ORTHOPAEDIC SURGERY

## 2021-03-07 PROCEDURE — 73501 X-RAY EXAM HIP UNI 1 VIEW: CPT

## 2021-03-07 PROCEDURE — 74011000250 HC RX REV CODE- 250: Performed by: NURSE PRACTITIONER

## 2021-03-07 PROCEDURE — 74011250637 HC RX REV CODE- 250/637: Performed by: STUDENT IN AN ORGANIZED HEALTH CARE EDUCATION/TRAINING PROGRAM

## 2021-03-07 PROCEDURE — 74011250636 HC RX REV CODE- 250/636: Performed by: NURSE ANESTHETIST, CERTIFIED REGISTERED

## 2021-03-07 PROCEDURE — 76000 FLUOROSCOPY <1 HR PHYS/QHP: CPT

## 2021-03-07 PROCEDURE — 76010000138 HC OR TIME 0.5 TO 1 HR: Performed by: ORTHOPAEDIC SURGERY

## 2021-03-07 PROCEDURE — 2709999900 HC NON-CHARGEABLE SUPPLY

## 2021-03-07 PROCEDURE — 36415 COLL VENOUS BLD VENIPUNCTURE: CPT

## 2021-03-07 PROCEDURE — 99233 SBSQ HOSP IP/OBS HIGH 50: CPT | Performed by: INTERNAL MEDICINE

## 2021-03-07 PROCEDURE — C1713 ANCHOR/SCREW BN/BN,TIS/BN: HCPCS | Performed by: ORTHOPAEDIC SURGERY

## 2021-03-07 PROCEDURE — 74011250636 HC RX REV CODE- 250/636: Performed by: ORTHOPAEDIC SURGERY

## 2021-03-07 PROCEDURE — 77030036660

## 2021-03-07 PROCEDURE — 74011250637 HC RX REV CODE- 250/637: Performed by: NURSE PRACTITIONER

## 2021-03-07 PROCEDURE — 77030008462 HC STPLR SKN PROX J&J -A: Performed by: ORTHOPAEDIC SURGERY

## 2021-03-07 PROCEDURE — 74011250636 HC RX REV CODE- 250/636: Performed by: EMERGENCY MEDICINE

## 2021-03-07 PROCEDURE — 77030040361 HC SLV COMPR DVT MDII -B

## 2021-03-07 PROCEDURE — 77030031139 HC SUT VCRL2 J&J -A: Performed by: ORTHOPAEDIC SURGERY

## 2021-03-07 PROCEDURE — 74011250637 HC RX REV CODE- 250/637: Performed by: ORTHOPAEDIC SURGERY

## 2021-03-07 PROCEDURE — 77030016474 HC BIT DRL QC3 SYNT -C: Performed by: ORTHOPAEDIC SURGERY

## 2021-03-07 PROCEDURE — 80069 RENAL FUNCTION PANEL: CPT

## 2021-03-07 PROCEDURE — 0QH606Z INSERTION OF INTRAMEDULLARY INTERNAL FIXATION DEVICE INTO RIGHT UPPER FEMUR, OPEN APPROACH: ICD-10-PCS | Performed by: ORTHOPAEDIC SURGERY

## 2021-03-07 PROCEDURE — 77030020788: Performed by: ORTHOPAEDIC SURGERY

## 2021-03-07 PROCEDURE — 76060000032 HC ANESTHESIA 0.5 TO 1 HR: Performed by: ORTHOPAEDIC SURGERY

## 2021-03-07 PROCEDURE — 74011250636 HC RX REV CODE- 250/636: Performed by: NURSE PRACTITIONER

## 2021-03-07 PROCEDURE — 82962 GLUCOSE BLOOD TEST: CPT

## 2021-03-07 PROCEDURE — 77030026438 HC STYL ET INTUB CARD -A: Performed by: ANESTHESIOLOGY

## 2021-03-07 PROCEDURE — 74011000250 HC RX REV CODE- 250: Performed by: NURSE ANESTHETIST, CERTIFIED REGISTERED

## 2021-03-07 PROCEDURE — 76210000006 HC OR PH I REC 0.5 TO 1 HR: Performed by: ORTHOPAEDIC SURGERY

## 2021-03-07 DEVICE — NAIL IM L235MM DIA11MM 130DEG SHT GRN R PROX FEM TI: Type: IMPLANTABLE DEVICE | Site: HIP | Status: FUNCTIONAL

## 2021-03-07 DEVICE — SCREW BNE L42MM DIA5MM NONSTERILE TIB LT GRN TI ST CANN LOK: Type: IMPLANTABLE DEVICE | Site: HIP | Status: FUNCTIONAL

## 2021-03-07 DEVICE — IMPLANTABLE DEVICE: Type: IMPLANTABLE DEVICE | Site: HIP | Status: FUNCTIONAL

## 2021-03-07 RX ORDER — LIDOCAINE HYDROCHLORIDE 10 MG/ML
0.1 INJECTION, SOLUTION EPIDURAL; INFILTRATION; INTRACAUDAL; PERINEURAL AS NEEDED
Status: DISCONTINUED | OUTPATIENT
Start: 2021-03-07 | End: 2021-03-07 | Stop reason: HOSPADM

## 2021-03-07 RX ORDER — NALOXONE HYDROCHLORIDE 0.4 MG/ML
0.4 INJECTION, SOLUTION INTRAMUSCULAR; INTRAVENOUS; SUBCUTANEOUS AS NEEDED
Status: DISCONTINUED | OUTPATIENT
Start: 2021-03-07 | End: 2021-03-12 | Stop reason: HOSPADM

## 2021-03-07 RX ORDER — SUCCINYLCHOLINE CHLORIDE 20 MG/ML
INJECTION INTRAMUSCULAR; INTRAVENOUS AS NEEDED
Status: DISCONTINUED | OUTPATIENT
Start: 2021-03-07 | End: 2021-03-07 | Stop reason: HOSPADM

## 2021-03-07 RX ORDER — LIDOCAINE HYDROCHLORIDE 20 MG/ML
INJECTION, SOLUTION EPIDURAL; INFILTRATION; INTRACAUDAL; PERINEURAL AS NEEDED
Status: DISCONTINUED | OUTPATIENT
Start: 2021-03-07 | End: 2021-03-07 | Stop reason: HOSPADM

## 2021-03-07 RX ORDER — TRAMADOL HYDROCHLORIDE 50 MG/1
50 TABLET ORAL
Status: DISCONTINUED | OUTPATIENT
Start: 2021-03-07 | End: 2021-03-07 | Stop reason: SDUPTHER

## 2021-03-07 RX ORDER — HEPARIN SODIUM 5000 [USP'U]/ML
5000 INJECTION, SOLUTION INTRAVENOUS; SUBCUTANEOUS EVERY 8 HOURS
Status: DISCONTINUED | OUTPATIENT
Start: 2021-03-08 | End: 2021-03-12 | Stop reason: HOSPADM

## 2021-03-07 RX ORDER — FERROUS SULFATE, DRIED 160(50) MG
1 TABLET, EXTENDED RELEASE ORAL
Status: DISCONTINUED | OUTPATIENT
Start: 2021-03-08 | End: 2021-03-12 | Stop reason: HOSPADM

## 2021-03-07 RX ORDER — MORPHINE SULFATE 2 MG/ML
1 INJECTION, SOLUTION INTRAMUSCULAR; INTRAVENOUS
Status: ACTIVE | OUTPATIENT
Start: 2021-03-07 | End: 2021-03-08

## 2021-03-07 RX ORDER — SODIUM CHLORIDE 9 MG/ML
125 INJECTION, SOLUTION INTRAVENOUS CONTINUOUS
Status: DISCONTINUED | OUTPATIENT
Start: 2021-03-07 | End: 2021-03-08

## 2021-03-07 RX ORDER — MORPHINE SULFATE 10 MG/ML
INJECTION, SOLUTION INTRAMUSCULAR; INTRAVENOUS AS NEEDED
Status: DISCONTINUED | OUTPATIENT
Start: 2021-03-07 | End: 2021-03-07 | Stop reason: HOSPADM

## 2021-03-07 RX ORDER — SODIUM CHLORIDE 0.9 % (FLUSH) 0.9 %
5-40 SYRINGE (ML) INJECTION AS NEEDED
Status: DISCONTINUED | OUTPATIENT
Start: 2021-03-07 | End: 2021-03-12 | Stop reason: HOSPADM

## 2021-03-07 RX ORDER — SODIUM CHLORIDE 0.9 % (FLUSH) 0.9 %
5-40 SYRINGE (ML) INJECTION EVERY 8 HOURS
Status: DISCONTINUED | OUTPATIENT
Start: 2021-03-07 | End: 2021-03-07 | Stop reason: HOSPADM

## 2021-03-07 RX ORDER — ENOXAPARIN SODIUM 100 MG/ML
40 INJECTION SUBCUTANEOUS DAILY
Status: DISCONTINUED | OUTPATIENT
Start: 2021-03-08 | End: 2021-03-07 | Stop reason: ALTCHOICE

## 2021-03-07 RX ORDER — ROCURONIUM BROMIDE 10 MG/ML
INJECTION, SOLUTION INTRAVENOUS AS NEEDED
Status: DISCONTINUED | OUTPATIENT
Start: 2021-03-07 | End: 2021-03-07 | Stop reason: HOSPADM

## 2021-03-07 RX ORDER — POLYETHYLENE GLYCOL 3350 17 G/17G
17 POWDER, FOR SOLUTION ORAL DAILY
Status: DISCONTINUED | OUTPATIENT
Start: 2021-03-08 | End: 2021-03-12 | Stop reason: HOSPADM

## 2021-03-07 RX ORDER — ETOMIDATE 2 MG/ML
INJECTION INTRAVENOUS AS NEEDED
Status: DISCONTINUED | OUTPATIENT
Start: 2021-03-07 | End: 2021-03-07 | Stop reason: HOSPADM

## 2021-03-07 RX ORDER — SODIUM CHLORIDE 0.9 % (FLUSH) 0.9 %
5-40 SYRINGE (ML) INJECTION EVERY 8 HOURS
Status: DISCONTINUED | OUTPATIENT
Start: 2021-03-07 | End: 2021-03-12 | Stop reason: HOSPADM

## 2021-03-07 RX ORDER — FACIAL-BODY WIPES
10 EACH TOPICAL DAILY PRN
Status: DISCONTINUED | OUTPATIENT
Start: 2021-03-09 | End: 2021-03-12 | Stop reason: HOSPADM

## 2021-03-07 RX ORDER — ONDANSETRON 2 MG/ML
4 INJECTION INTRAMUSCULAR; INTRAVENOUS
Status: ACTIVE | OUTPATIENT
Start: 2021-03-07 | End: 2021-03-08

## 2021-03-07 RX ORDER — SODIUM CHLORIDE 0.9 % (FLUSH) 0.9 %
5-40 SYRINGE (ML) INJECTION AS NEEDED
Status: DISCONTINUED | OUTPATIENT
Start: 2021-03-07 | End: 2021-03-07 | Stop reason: SDUPTHER

## 2021-03-07 RX ORDER — AMOXICILLIN 250 MG
1 CAPSULE ORAL 2 TIMES DAILY
Status: DISCONTINUED | OUTPATIENT
Start: 2021-03-07 | End: 2021-03-12 | Stop reason: HOSPADM

## 2021-03-07 RX ORDER — ACETAMINOPHEN 325 MG/1
650 TABLET ORAL EVERY 6 HOURS
Status: DISCONTINUED | OUTPATIENT
Start: 2021-03-08 | End: 2021-03-12 | Stop reason: HOSPADM

## 2021-03-07 RX ORDER — ONDANSETRON 4 MG/1
4 TABLET, ORALLY DISINTEGRATING ORAL
Status: DISCONTINUED | OUTPATIENT
Start: 2021-03-07 | End: 2021-03-12 | Stop reason: HOSPADM

## 2021-03-07 RX ORDER — PROPOFOL 10 MG/ML
INJECTION, EMULSION INTRAVENOUS AS NEEDED
Status: DISCONTINUED | OUTPATIENT
Start: 2021-03-07 | End: 2021-03-07 | Stop reason: HOSPADM

## 2021-03-07 RX ORDER — ONDANSETRON 2 MG/ML
INJECTION INTRAMUSCULAR; INTRAVENOUS AS NEEDED
Status: DISCONTINUED | OUTPATIENT
Start: 2021-03-07 | End: 2021-03-07 | Stop reason: HOSPADM

## 2021-03-07 RX ADMIN — PROPOFOL 40 MG: 10 INJECTION, EMULSION INTRAVENOUS at 13:32

## 2021-03-07 RX ADMIN — ACETAMINOPHEN 650 MG: 325 TABLET ORAL at 18:35

## 2021-03-07 RX ADMIN — SUCCINYLCHOLINE CHLORIDE 140 MG: 20 INJECTION, SOLUTION INTRAMUSCULAR; INTRAVENOUS at 13:32

## 2021-03-07 RX ADMIN — Medication 3 AMPULE: at 13:15

## 2021-03-07 RX ADMIN — DOCUSATE SODIUM 50MG AND SENNOSIDES 8.6MG 1 TABLET: 8.6; 5 TABLET, FILM COATED ORAL at 21:02

## 2021-03-07 RX ADMIN — Medication 10 ML: at 21:02

## 2021-03-07 RX ADMIN — ACETAMINOPHEN 650 MG: 325 TABLET ORAL at 23:06

## 2021-03-07 RX ADMIN — ACETAMINOPHEN 650 MG: 325 TABLET ORAL at 09:57

## 2021-03-07 RX ADMIN — SODIUM CHLORIDE 40 MCG/MIN: 900 INJECTION, SOLUTION INTRAVENOUS at 13:41

## 2021-03-07 RX ADMIN — ONDANSETRON HYDROCHLORIDE 4 MG: 2 INJECTION, SOLUTION INTRAMUSCULAR; INTRAVENOUS at 13:56

## 2021-03-07 RX ADMIN — PREGABALIN 50 MG: 25 CAPSULE ORAL at 09:57

## 2021-03-07 RX ADMIN — SODIUM CHLORIDE 100 ML/HR: 9 INJECTION, SOLUTION INTRAVENOUS at 11:53

## 2021-03-07 RX ADMIN — ACETAMINOPHEN 650 MG: 325 TABLET ORAL at 17:46

## 2021-03-07 RX ADMIN — ACETAMINOPHEN 650 MG: 325 TABLET ORAL at 03:25

## 2021-03-07 RX ADMIN — LIDOCAINE HYDROCHLORIDE 60 MG: 20 INJECTION, SOLUTION EPIDURAL; INFILTRATION; INTRACAUDAL; PERINEURAL at 13:32

## 2021-03-07 RX ADMIN — WATER 2 G: 1 INJECTION INTRAMUSCULAR; INTRAVENOUS; SUBCUTANEOUS at 13:37

## 2021-03-07 RX ADMIN — MORPHINE SULFATE 5 MG: 10 INJECTION INTRAVENOUS at 13:32

## 2021-03-07 RX ADMIN — Medication 1 AMPULE: at 21:02

## 2021-03-07 RX ADMIN — Medication 10 ML: at 06:20

## 2021-03-07 RX ADMIN — MORPHINE SULFATE 5 MG: 10 INJECTION INTRAVENOUS at 13:52

## 2021-03-07 RX ADMIN — SODIUM CHLORIDE 100 ML/HR: 9 INJECTION, SOLUTION INTRAVENOUS at 02:30

## 2021-03-07 RX ADMIN — ROCURONIUM BROMIDE 10 MG: 10 INJECTION INTRAVENOUS at 13:32

## 2021-03-07 RX ADMIN — Medication 10 ML: at 00:06

## 2021-03-07 RX ADMIN — SODIUM CHLORIDE 125 ML/HR: 9 INJECTION, SOLUTION INTRAVENOUS at 21:06

## 2021-03-07 RX ADMIN — ETOMIDATE 10 MG: 2 INJECTION, SOLUTION INTRAVENOUS at 13:32

## 2021-03-07 RX ADMIN — SODIUM CHLORIDE 100 ML/HR: 9 INJECTION, SOLUTION INTRAVENOUS at 15:26

## 2021-03-07 RX ADMIN — DOCUSATE SODIUM - SENNOSIDES 2 TABLET: 50; 8.6 TABLET, FILM COATED ORAL at 17:46

## 2021-03-07 NOTE — PERIOP NOTES
TRANSFER - IN REPORT:    Verbal report received from Melquiades Washburn RN (name) on Drew Albert  being received from [facility](unit) for routine post - op      Report consisted of patients Situation, Background, Assessment and   Recommendations(SBAR). Information from the following report(s) SBAR, Kardex, OR Summary, Procedure Summary, Intake/Output, MAR, Accordion, Recent Results, Med Rec Status and Cardiac Rhythm NSR was reviewed with the receiving nurse. Opportunity for questions and clarification was provided. Assessment completed upon patients arrival to unit and care assumed. Dark area to anterior nose, unsure if dark discoloration occurred during and patient poor historian.

## 2021-03-07 NOTE — BRIEF OP NOTE
Brief Postoperative Note    Patient: Floyd Stone  YOB: 1932  MRN: [id number]    Date of Procedure: 3/7/2021     Pre-Op Diagnosis: RIGHT HIP FRACTURE    Post-Op Diagnosis: Same as preoperative diagnosis. Procedure(s):  RIGHT FEMUR INSERTION INTRA MEDULLARY NAIL (URGENT)    Surgeon(s):  Lorena Kennedy DO    Surgical Assistant: None    Anesthesia: General     Estimated Blood Loss (mL): 346     Complications: None    Specimens: * No specimens in log *     Implants:   Implant Name Type Inv.  Item Serial No.  Lot No. LRB No. Used Action   NAIL IM L235MM [id number] 130DEG SHT GRN R PROX FEM TI - SNA  NAIL IM L235MM [id number] 130DEG SHT GRN R PROX FEM TI NA [employer]_ [id number] Right 1 Implanted   BLADE IM L105MM DIA10.35MM PROX FEM G TI HEIKE FEN BRIANNA FOR - SNA  BLADE IM L105MM DIA10.35MM PROX FEM G TI HEIKE FEN BRIANNA FOR NA DEPUY [employer]_WD NA Right 1 Implanted   SCREW BNE L42MM DIA5MM NONSTERILE TIB LT GRN TI ST HEIKE LOREN - SNA  SCREW BNE L42MM DIA5MM NONSTERILE TIB LT GRN TI ST HEIKE LOREN NA DEPUY [employer] [place]_WD NA Right 1 Implanted       Drains: * No LDAs found *    Findings: comminuted L IT hip fx    Electronically Signed by Jared Najera DO on 3/7/2021 at 2:20 PM

## 2021-03-07 NOTE — PROGRESS NOTES
End of Shift Note    Bedside shift change report given to Maged Jansen  (oncoming nurse) by Demond Mojica (offgoing nurse). Report included the following information SBAR and Kardex    Shift worked:  [id number]     Shift summary and any significant changes:    none   Concerns for physician to address: none   Zone phone for oncoming shift:  [phone]     Activity:  Activity Level: Bed Rest  Number times ambulated in hallways past shift: 0  Number of times OOB to chair past shift: 0    Cardiac:   Cardiac Monitoring: Yes      Cardiac Rhythm: Normal sinus rhythm    Access:   Current line(s): PIV     Genitourinary:   Urinary status: barnett    Respiratory:   O2 Device: Room air  Chronic home O2 use?: NO  Incentive spirometer at bedside: YES     GI:     Current diet:  DIET NPO  Passing flatus: YES  Tolerating current diet: YES       Pain Management:   Patient states pain is manageable on current regimen: YES    Skin:  Aguila Score: 16  Interventions: internal/external urinary devices    Patient Safety:  Fall Score:  Total Score: 5  Interventions: bed/chair alarm  High Fall Risk: Yes    Length of Stay:  Expected LOS: - - -  Actual LOS:   [facility]

## 2021-03-07 NOTE — PROGRESS NOTES
0700: Bedside shift change report given to Prabhakar Carlton (oncoming nurse) by Kenzie Huffman RN (offgoing nurse). Report included the following information SBAR and Kardex. 0915: Called PACU to inquire about medication to administer this morning prior to Sx. Advised to give Tylenol & Lyrica; Hold pericolace; After Sx give allopurinol & B12.    0930: Pt decision maker (grandson) contact information not listed in demographics. Contacted pt's daughter and friend Gorge Carrillo) to get pt's grandson's Darrick Yu) number: [phone]. Called grandson and he said he will be at hospital by 10 to sign consent for Sx.    1228: TRANSFER - OUT REPORT:    Verbal report given to Elvia Lopez RN (name) on Denisha Del Toro  being transferred to PACU (unit) for routine progression of care       Report consisted of patients Situation, Background, Assessment and   Recommendations(SBAR). Information from the following report(s) SBAR and Kardex was reviewed with the receiving nurse. Lines:   Peripheral IV 03/05/21 Antecubital (Active)   Site Assessment Clean, dry, & intact 03/07/21 1100   Phlebitis Assessment 0 03/07/21 1100   Infiltration Assessment 0 03/07/21 1100   Dressing Status Clean, dry, & intact 03/07/21 1100   Dressing Type Tape;Transparent 03/07/21 1100   Hub Color/Line Status Green;Capped 03/07/21 1100   Action Taken Open ports on tubing capped 03/07/21 0329   Alcohol Cap Used Yes 03/07/21 1100       Peripheral IV 03/05/21 Left Forearm (Active)   Site Assessment Clean, dry, & intact 03/07/21 1100   Phlebitis Assessment 0 03/07/21 1100   Infiltration Assessment 0 03/07/21 1100   Dressing Status Clean, dry, & intact 03/07/21 1100   Dressing Type Tape;Transparent 03/07/21 1100   Hub Color/Line Status Green; Infusing 03/07/21 1100   Action Taken Dressing reinforced 03/06/21 2013   Alcohol Cap Used No 03/06/21 2013        Opportunity for questions and clarification was provided.       Patient transported with:   Registered Nurse        1500: TRANSFER - IN REPORT:    Verbal report received from [place] (name) on Michael Moore  being received from PACU (unit) for routine post - op      Report consisted of patients Situation, Background, Assessment and   Recommendations(SBAR). Information from the following report(s) SBAR and Kardex was reviewed with the receiving nurse. Opportunity for questions and clarification was provided. Assessment completed upon patients arrival to unit and care assumed. 1900: End of Shift Note    Bedside shift change report given to [address] (oncoming nurse) by Tata Buckley (offgoing nurse). Report included the following information SBAR and Kardex    Shift worked:  day     Shift summary and any significant changes:    S/p Hip Sx. Concerns for physician to address: none   Zone phone for oncoming shift:       Activity:  Activity Level: Bed Rest  Number times ambulated in hallways past shift: 0  Number of times OOB to chair past shift: 0    Cardiac:   Cardiac Monitoring: Yes      Cardiac Rhythm: Normal sinus rhythm    Access:   Current line(s): PIV     Genitourinary:   Urinary status: barnett    Respiratory:   O2 Device: Room air  Chronic home O2 use?: NO  Incentive spirometer at bedside: NO     GI:  Last Bowel Movement Date: (prior to admit)  Current diet:  No diet orders on file  Passing flatus: YES  Tolerating current diet: YES  % Diet Eaten: 0 %    Pain Management:   Patient states pain is manageable on current regimen: YES    Skin:  Aguila Score: 15  Interventions: float heels, foam dressing and internal/external urinary devices    Patient Safety:  Fall Score:  Total Score: 4  Interventions: bed/chair alarm  High Fall Risk: Yes    Length of Stay:  Expected LOS: - - -  Actual LOS: 2      Tata Buckley

## 2021-03-07 NOTE — PERIOP NOTES
Handoff Report from Operating Room to PACU    Report received from JAIME Horton RN and Nisha Coleamn CRNA regarding Floyd Stone. Surgeon(s):  Papito Peralta DO  And Procedure(s) (LRB):  RIGHT FEMUR INSERTION INTRA MEDULLARY NAIL (URGENT) (Right)  confirmed   with allergies and dressings discussed. Anesthesia type, drugs, patient history, complications, estimated blood loss, vital signs, intake and output, and last pain medication, lines, reversal medications and temperature were reviewed.

## 2021-03-07 NOTE — PERIOP NOTES
TRANSFER - OUT REPORT:    Verbal report given to Kerrie Miller RN(name) on Timothy Figueroa  being transferred to 2217(unit) for routine post - op       Report consisted of patients Situation, Background, Assessment and   Recommendations(SBAR). Information from the following report(s) SBAR, Kardex, ED Summary, OR Summary, Procedure Summary, Intake/Output, MAR, Accordion, Recent Results, Med Rec Status, Cardiac Rhythm NSR, Alarm Parameters , Pre Procedure Checklist, Procedure Verification and Quality Measures was reviewed with the receiving nurse. Opportunity for questions and clarification was provided.       Patient transported with:   Monitor  Registered Nurse

## 2021-03-07 NOTE — PROGRESS NOTES
3/7/2021 8:54 AM    Admit Date: 3/5/2021    Admit Diagnosis: Hip fracture ([facility] Farzana Query; Seizure ([facility] [R56.9]; LUCY (acute kidney injury) ([facility] [N17.9]; Hyperkalemia [E87.5]    Subjective:     Arrie Bamberger denies chest pain, chest pressure/discomfort, dyspnea, palpitations, irregular heart beats, near-syncope, syncope, fatigue, orthopnea, paroxysmal nocturnal dyspnea, exertional chest pressure/discomfort.     Visit Vitals  /78 (BP 1 Location: Left upper arm, BP Patient Position: At rest)   Pulse 79   Temp 98.5 °F (36.9 °C)   Resp 16   Ht 5' 6\" (1.676 m)   Wt 165 lb 3.2 oz (74.9 kg)   SpO2 99%   BMI 26.66 kg/m²     Current Facility-Administered Medications   Medication Dose Route Frequency    morphine injection 1 mg  1 mg IntraVENous Q3H PRN    sodium chloride (NS) flush 5-40 mL  5-40 mL IntraVENous Q8H    0.9% sodium chloride infusion  100 mL/hr IntraVENous CONTINUOUS    sodium chloride (NS) flush 5-40 mL  5-40 mL IntraVENous PRN    acetaminophen (TYLENOL) tablet 650 mg  650 mg Oral Q6H    naloxone (NARCAN) injection 0.4 mg  0.4 mg IntraVENous PRN    senna-docusate (PERICOLACE) 8.6-50 mg per tablet 2 Tab  2 Tab Oral BID    oxyCODONE IR (ROXICODONE) tablet 2.5 mg  2.5 mg Oral Q4H PRN    allopurinoL (ZYLOPRIM) tablet 100 mg  100 mg Oral DAILY    pregabalin (LYRICA) capsule 50 mg  50 mg Oral DAILY    traZODone (DESYREL) tablet 50 mg  50 mg Oral QHS PRN    latanoprost (XALATAN) 0.005 % ophthalmic solution 1 Drop  1 Drop Both Eyes QHS    cyanocobalamin (VITAMIN B12) tablet 1,000 mcg  1,000 mcg Oral DAILY    acetaminophen (TYLENOL) tablet 650 mg  650 mg Oral Q6H PRN    Or    acetaminophen (TYLENOL) suppository 650 mg  650 mg Rectal Q6H PRN    polyethylene glycol (MIRALAX) packet 17 g  17 g Oral DAILY PRN    promethazine (PHENERGAN) tablet 12.5 mg  12.5 mg Oral Q6H PRN    Or    ondansetron (ZOFRAN) injection 4 mg  4 mg IntraVENous Q6H PRN    heparin (porcine) injection 5,000 Units  5,000 Units SubCUTAneous Q8H    insulin lispro (HUMALOG) injection   SubCUTAneous AC&HS    glucose chewable tablet 16 g  4 Tab Oral PRN    dextrose (D50W) injection syrg 12.5-25 g  12.5-25 g IntraVENous PRN    glucagon (GLUCAGEN) injection 1 mg  1 mg IntraMUSCular PRN         Objective:      Visit Vitals  /78 (BP 1 Location: Left upper arm, BP Patient Position: At rest)   Pulse 79   Temp 98.5 °F (36.9 °C)   Resp 16   Ht 5' 6\" (1.676 m)   Wt 165 lb 3.2 oz (74.9 kg)   SpO2 99%   BMI 26.66 kg/m²       Physical Exam:  Resting comfortably. No resp distress.    Extremities: no cyanosis or edema  Heart: regular rate and rhythm, S1, S2 normal, no murmur, click, rub or gallop  Lungs: clear to auscultation bilaterally  Neurologic: Grossly normal    Data Review:   Labs:    Recent Results (from the past 24 hour(s))   GLUCOSE, POC    Collection Time: 03/06/21 11:07 AM   Result Value Ref Range    Glucose (POC) 135 (H) 65 - 100 mg/dL    Performed by Cristian Colace    GLUCOSE, POC    Collection Time: 03/06/21  4:06 PM   Result Value Ref Range    Glucose (POC) 74 65 - 100 mg/dL    Performed by Cristian Colace    ECHO ADULT COMPLETE    Collection Time: 03/06/21  4:19 PM   Result Value Ref Range    IVSd 1.44 (A) 0.6 - 1.0 cm    IVSs 2.10 cm    LVIDd 4.10 (A) 4.2 - 5.9 cm    LVIDs 2.76 cm    LVOT d 2.11 cm    LVPWd 1.41 (A) 0.6 - 1.0 cm    LVPWs 1.95 cm    LVOT Peak Gradient 3.73 mmHg    LVOT Peak Velocity 96.53 cm/s    RVIDd 3.19 cm    RVSP 24.68 mmHg    Left Atrium Major Axis 2.97 cm    LA Volume 81.46 18 - 58 mL    LA Area 4C 19.93 cm2    LA Vol 2C 82.64 (A) 18 - 58 mL    LA Vol 4C 60.14 (A) 18 - 58 mL    Est. RA Pressure 3.00 mmHg    Aortic Valve Area by Continuity of Peak Velocity 3.11 cm2    AoV PG 4.69 mmHg    Aortic Valve Systolic Peak Velocity 152.20 cm/s    PISA MR Rad 0.60 cm    MV A Amos 104.52 cm/s    Mitral Valve E Wave Deceleration Time 156.06 ms    MV E Amos 90.86 cm/s    E/E' ratio (averaged) 19.99 E/E' lateral 13.02     E/E' septal 26.96     LV E' Lateral Velocity 6.98 cm/s    LV E' Septal Velocity 3.37 cm/s    Mitral Effective Regurgitant Orifice Area 0.16 cm2    MV regurgitant volume 31.13 mL    Mitral Regurgitant PISA Peak Velocity 592.57 cm/s    Mitral Regurgitant Velocity Time Integral 192.27 cm    Pulmonic Valve Systolic Peak Instantaneous Gradient 3.98 mmHg    Pulmonic Valve Max Velocity 99.71 cm/s    Triscuspid Valve Regurgitation Peak Gradient 21.68 mmHg    TR Max Velocity 232.68 cm/s    Ao Root D 2.80 cm    MV E/A 0.87     LV Mass .6 88 - 224 g    LV Mass AL Index 119.5 49 - 115 g/m2    Left Atrium Minor Axis 1.59 cm    LA Vol Index 43.74 16 - 28 ml/m2    LA Vol Index 44.37 16 - 28 ml/m2    LA Vol Index 32.29 16 - 28 ml/m2    MICHELLE/BSA Pk Amos 1.7 cm2/m2   GLUCOSE, POC    Collection Time: 03/06/21  4:43 PM   Result Value Ref Range    Glucose (POC) 80 65 - 100 mg/dL    Performed by Maria Esther Schulte RN    GLUCOSE, POC    Collection Time: 03/06/21  5:01 PM   Result Value Ref Range    Glucose (POC) 92 65 - 100 mg/dL    Performed by Maria Esther Schulte RN    GLUCOSE, POC    Collection Time: 03/06/21  8:46 PM   Result Value Ref Range    Glucose (POC) 135 (H) 65 - 100 mg/dL    Performed by Robyn Valdovinos    RENAL FUNCTION PANEL    Collection Time: 03/07/21  3:17 AM   Result Value Ref Range    Sodium 136 136 - 145 mmol/L    Potassium 4.1 3.5 - 5.1 mmol/L    Chloride 107 97 - 108 mmol/L    CO2 23 21 - 32 mmol/L    Anion gap 6 5 - 15 mmol/L    Glucose 99 65 - 100 mg/dL    BUN 25 (H) 6 - 20 MG/DL    Creatinine 2.40 (H) 0.70 - 1.30 MG/DL    BUN/Creatinine ratio 10 (L) 12 - 20      GFR est AA 31 (L) >60 ml/min/1.73m2    GFR est non-AA 26 (L) >60 ml/min/1.73m2    Calcium 8.0 (L) 8.5 - 10.1 MG/DL    Phosphorus 3.6 2.6 - 4.7 MG/DL    Albumin 3.0 (L) 3.5 - 5.0 g/dL   METABOLIC PANEL, BASIC    Collection Time: 03/07/21  3:17 AM   Result Value Ref Range    Sodium 134 (L) 136 - 145 mmol/L    Potassium 4.0 3.5 - 5.1 mmol/L    Chloride 105 97 - 108 mmol/L    CO2 24 21 - 32 mmol/L    Anion gap 5 5 - 15 mmol/L    Glucose 99 65 - 100 mg/dL    BUN 25 (H) 6 - 20 MG/DL    Creatinine 2.40 (H) 0.70 - 1.30 MG/DL    BUN/Creatinine ratio 10 (L) 12 - 20      GFR est AA 31 (L) >60 ml/min/1.73m2    GFR est non-AA 26 (L) >60 ml/min/1.73m2    Calcium 8.0 (L) 8.5 - 10.1 MG/DL   GLUCOSE, POC    Collection Time: 03/07/21  7:16 AM   Result Value Ref Range    Glucose (POC) 99 65 - 100 mg/dL    Performed by Brooke Colmenares        Telemetry: SR      Assessment:     Active Problems:    Hyperkalemia (3/5/2021)      Hip fracture ([facility] (3/5/2021)      Seizure ([facility] (3/5/2021)      LUCY (acute kidney injury) ([facility] (3/5/2021)        Plan:     1. Pre op evaluation: intermediate risk surgery. Baseline poor functional status. Echo noted. Recommend leximyoview prior to surgery. If normal then can proceed. If surgery has to be done urgently over weekend then can proceed, otherwise will get trell on Monday. 2. Cr stable. 3. BP controlled.

## 2021-03-07 NOTE — PROGRESS NOTES
Problem: Falls - Risk of  Goal: *Absence of Falls  Description: Document oJse Calles Fall Risk and appropriate interventions in the flowsheet.   Outcome: Progressing Towards Goal  Note: Fall Risk Interventions:  Mobility Interventions: Communicate number of staff needed for ambulation/transfer    Mentation Interventions: Adequate sleep, hydration, pain control    Medication Interventions: Bed/chair exit alarm    Elimination Interventions: Bed/chair exit alarm, Call light in reach    History of Falls Interventions: Bed/chair exit alarm, Consult care management for discharge planning, Door open when patient unattended, Evaluate medications/consider consulting pharmacy, Investigate reason for fall, Room close to nurse's station, Vital signs minimum Q4HRs X 24 hrs (comment for end date)         Problem: Infection - Risk of, Urinary Catheter-Associated Urinary Tract Infection  Goal: *Absence of infection signs and symptoms  Outcome: Progressing Towards Goal

## 2021-03-07 NOTE — PROGRESS NOTES
[facility]   [address] [facility], [address] [place] [address] [place], [facility]  Phone: [phone]   [phone]       Nephrology Progress Note  Marjorie Huizar     10/6/1932     [id number]  Date of Admission : 3/5/2021  03/07/21    Patient for surgical repair of hip. His BP is stable as is his reanl function. Avoid all NSAIDS.   Will continue to follow      Joanell Castleman, MD

## 2021-03-07 NOTE — ANESTHESIA POSTPROCEDURE EVALUATION
Procedure(s):  RIGHT FEMUR INSERTION INTRA MEDULLARY NAIL (URGENT). general    Anesthesia Post Evaluation        Patient location during evaluation: PACU  Note status: Adequate. Level of consciousness: responsive to verbal stimuli and sleepy but conscious  Pain management: satisfactory to patient  Airway patency: patent  Anesthetic complications: no  Cardiovascular status: acceptable  Respiratory status: acceptable  Hydration status: acceptable  Comments: +Post-Anesthesia Evaluation and Assessment    Patient: Thierno Peña MRN: [id number]  SSN: [id number]   YOB: 1932  Age: 80 y.o. Sex: male      Cardiovascular Function/Vital Signs    BP (!) 142/68 (BP 1 Location: Right upper arm, BP Patient Position: At rest)   Pulse 79   Temp 37 °C (98.6 °F)   Resp 13   Ht 5' 6\" (1.676 m)   Wt 74.9 kg (165 lb 3.2 oz)   SpO2 100%   BMI 26.66 kg/m²     Patient is status post Procedure(s):  RIGHT FEMUR INSERTION INTRA MEDULLARY NAIL (URGENT). Nausea/Vomiting: Controlled. Postoperative hydration reviewed and adequate. Pain:  Pain Scale 1: Numeric (0 - 10) (03/07/21 1445)  Pain Intensity 1: 0 (03/07/21 1445)   Managed. Neurological Status:   Neuro (WDL): Exceptions to WDL (03/07/21 1422)   At baseline. Mental Status and Level of Consciousness: Arousable. Pulmonary Status:   O2 Device: Nasal cannula (03/07/21 1445)   Adequate oxygenation and airway patent. Complications related to anesthesia: None    Post-anesthesia assessment completed. No concerns. Signed By: Deepthi Tang MD    3/7/2021  Post anesthesia nausea and vomiting:  controlled      INITIAL Post-op Vital signs:   Vitals Value Taken Time   /66 03/07/21 1500   Temp 37 °C (98.6 °F) 03/07/21 1423   Pulse 85 03/07/21 1510   Resp 18 03/07/21 1510   SpO2 97 % 03/07/21 1510   Vitals shown include unvalidated device data.

## 2021-03-07 NOTE — PROGRESS NOTES
Reason for Admission:   Hip Fracture; Seizure; LUCY; Hyperkalemia                  RUR Score:  25               PCP: First and Last name:   Emir Banuelos MD     Name of Practice: [address]   Are you a current patient: Yes/No: Yes   Approximate date of last visit: 3-4 weeks ago   Can you participate in a virtual visit if needed: Yes, in-person preferred    Do you (patient/family) have any concerns for transition/discharge? No significant concerns at this time. Plan to discuss PT/OT recommendations once evaluated post-surgery today. Plan for utilizing home health: Pending post-operative PT/OT evaluations. If recommended, [facility] but awaiting if rehab level is recommended. Current Advanced Directive/Advance Care Plan:  Full Code - No ACP on file at this time. . Has 3 children (Edsoneugenia Luna, [place], Mima Landon [phone]). Grandkandice Irwin, [phone]) is main family POC, reports he believes he is insure if pt has one previously completed. Pending pt's recovery post-surgery, may be interested in completing prior to discharge. Healthcare Decision Maker: Mima Navarrete ([phone])  Click here to complete [address] including selection of the Healthcare Decision Maker Relationship (ie \"Primary\")              Transition of Care Plan:   1) Rehab vs. HH pending Post-Op PT/OT evaluations  2) 2nd IM Letter  3) Family to transport home pending progress  4) Follow-up Care Appointments    81 YO Black Male admitted on 3/5/21 for Hip Fracture; Seizure; LUCY; Hyperkalemia. Lives in [facility] apartment on 1st floor with [address] in [place] with his girlfriend Laura Leonard, [phone]). There is a curb in the parking lot pt has to step over to get to apartment (which grandson reports is where he has falls), however there is a ramp - just located farther away from the parking lot.  Independent with ADLs, manages own medications, and ambulates Mod I with rollator. Girlfriend provides transportation and cooks meals. Fer Torres, [phone]) typically provides transportation and accompanies pt to medical appointments. Hx of HH (does not recall agency name). Denies hx of SNF or IPR. Has a rollator at home. Has [facility] Medicare [facility] and [facility] Medicaid [place] [facility] - may need authorization for rehab, need to clarify with facilities if recommended. Preferred Rx is [facility] ([address]). Pt off the floor in OR for Ortho surgery. CM spoke with pt's family member Mario Alberto Torres) to complete initial assessment, dc planning. CM consulted for \"Discharge Planning Rehab vs. HH.\" No current PT/OT consults at this time. Once PT/OT evaluations completed, CM to follow-up with pt and family about dispo plan. CM will continue to remain available to assist with dc planning     Care Management Interventions  PCP Verified by CM: Yes  Palliative Care Criteria Met (RRAT>21 & CHF Dx)?: No  Mode of Transport at Discharge:  Other (see comment)(by pricate vehicle with family vs. BLS)  Transition of Care Consult (CM Consult): Discharge Planning  Discharge Durable Medical Equipment: No(rollator at home)  Health Maintenance Reviewed: Yes  Physical Therapy Consult: No  Occupational Therapy Consult: No  Speech Therapy Consult: No  Current Support Network: Family Lives [place], Other(2-story apartment on 1st floor ([address]) with girlfriend; grandson lives [place], supportive)  Confirm Follow Up Transport: Family  The Plan for Transition of Care is Related to the Following Treatment Goals : Rehab vs. [facility] pending PT/OT recommendations  Discharge Location  Discharge Placement: Unable to determine at this time    Ji Henry, [address] [facility]  [phone]

## 2021-03-07 NOTE — PROGRESS NOTES
Problem: Falls - Risk of  Goal: *Absence of Falls  Description: Document Zach Hernandez Fall Risk and appropriate interventions in the flowsheet.   Outcome: Progressing Towards Goal  Note: Fall Risk Interventions:  Mobility Interventions: Communicate number of staff needed for ambulation/transfer    Mentation Interventions: Adequate sleep, hydration, pain control    Medication Interventions: Bed/chair exit alarm    Elimination Interventions: Bed/chair exit alarm, Call light in reach    History of Falls Interventions: Bed/chair exit alarm, Consult care management for discharge planning, Door open when patient unattended, Evaluate medications/consider consulting pharmacy, Investigate reason for fall, Room close to nurse's station, Vital signs minimum Q4HRs X 24 hrs (comment for end date)         Problem: Infection - Risk of, Urinary Catheter-Associated Urinary Tract Infection  Goal: *Absence of infection signs and symptoms  Outcome: Progressing Towards Goal     Problem: Patient Education: Go to Patient Education Activity  Goal: Patient/Family Education  Outcome: Progressing Towards Goal

## 2021-03-08 LAB
ANION GAP SERPL CALC-SCNC: 7 MMOL/L (ref 5–15)
BASOPHILS # BLD: 0 K/UL (ref 0–0.1)
BASOPHILS NFR BLD: 1 % (ref 0–1)
BUN SERPL-MCNC: 28 MG/DL (ref 6–20)
BUN/CREAT SERPL: 11 (ref 12–20)
CALCIUM SERPL-MCNC: 7.7 MG/DL (ref 8.5–10.1)
CHLORIDE SERPL-SCNC: 106 MMOL/L (ref 97–108)
CO2 SERPL-SCNC: 23 MMOL/L (ref 21–32)
CREAT SERPL-MCNC: 2.44 MG/DL (ref 0.7–1.3)
DIFFERENTIAL METHOD BLD: ABNORMAL
EOSINOPHIL # BLD: 0.1 K/UL (ref 0–0.4)
EOSINOPHIL NFR BLD: 1 % (ref 0–7)
ERYTHROCYTE [DISTWIDTH] IN BLOOD BY AUTOMATED COUNT: 13 % (ref 11.5–14.5)
GLUCOSE BLD STRIP.AUTO-MCNC: 118 MG/DL (ref 65–100)
GLUCOSE BLD STRIP.AUTO-MCNC: 168 MG/DL (ref 65–100)
GLUCOSE BLD STRIP.AUTO-MCNC: 206 MG/DL (ref 65–100)
GLUCOSE BLD STRIP.AUTO-MCNC: 252 MG/DL (ref 65–100)
GLUCOSE BLD STRIP.AUTO-MCNC: 302 MG/DL (ref 65–100)
GLUCOSE SERPL-MCNC: 115 MG/DL (ref 65–100)
HCT VFR BLD AUTO: 23.5 % (ref 36.6–50.3)
HGB BLD-MCNC: 7.5 G/DL (ref 12.1–17)
IMM GRANULOCYTES # BLD AUTO: 0 K/UL (ref 0–0.04)
IMM GRANULOCYTES NFR BLD AUTO: 1 % (ref 0–0.5)
LYMPHOCYTES # BLD: 1.1 K/UL (ref 0.8–3.5)
LYMPHOCYTES NFR BLD: 13 % (ref 12–49)
MCH RBC QN AUTO: 31 PG (ref 26–34)
MCHC RBC AUTO-ENTMCNC: 31.9 G/DL (ref 30–36.5)
MCV RBC AUTO: 97.1 FL (ref 80–99)
MONOCYTES # BLD: 0.8 K/UL (ref 0–1)
MONOCYTES NFR BLD: 9 % (ref 5–13)
NEUTS SEG # BLD: 6.2 K/UL (ref 1.8–8)
NEUTS SEG NFR BLD: 76 % (ref 32–75)
NRBC # BLD: 0 K/UL (ref 0–0.01)
NRBC BLD-RTO: 0 PER 100 WBC
PLATELET # BLD AUTO: 120 K/UL (ref 150–400)
PMV BLD AUTO: 10.2 FL (ref 8.9–12.9)
POTASSIUM SERPL-SCNC: 4.2 MMOL/L (ref 3.5–5.1)
RBC # BLD AUTO: 2.42 M/UL (ref 4.1–5.7)
SERVICE CMNT-IMP: ABNORMAL
SODIUM SERPL-SCNC: 136 MMOL/L (ref 136–145)
WBC # BLD AUTO: 8.1 K/UL (ref 4.1–11.1)

## 2021-03-08 PROCEDURE — 85025 COMPLETE CBC W/AUTO DIFF WBC: CPT

## 2021-03-08 PROCEDURE — 97161 PT EVAL LOW COMPLEX 20 MIN: CPT

## 2021-03-08 PROCEDURE — 99232 SBSQ HOSP IP/OBS MODERATE 35: CPT | Performed by: INTERNAL MEDICINE

## 2021-03-08 PROCEDURE — 74011250637 HC RX REV CODE- 250/637: Performed by: ORTHOPAEDIC SURGERY

## 2021-03-08 PROCEDURE — 82962 GLUCOSE BLOOD TEST: CPT

## 2021-03-08 PROCEDURE — 97110 THERAPEUTIC EXERCISES: CPT

## 2021-03-08 PROCEDURE — 74011636637 HC RX REV CODE- 636/637: Performed by: ORTHOPAEDIC SURGERY

## 2021-03-08 PROCEDURE — 65270000029 HC RM PRIVATE

## 2021-03-08 PROCEDURE — 2709999900 HC NON-CHARGEABLE SUPPLY

## 2021-03-08 PROCEDURE — 97530 THERAPEUTIC ACTIVITIES: CPT | Performed by: OCCUPATIONAL THERAPIST

## 2021-03-08 PROCEDURE — 80048 BASIC METABOLIC PNL TOTAL CA: CPT

## 2021-03-08 PROCEDURE — 74011250636 HC RX REV CODE- 250/636: Performed by: ORTHOPAEDIC SURGERY

## 2021-03-08 PROCEDURE — 97530 THERAPEUTIC ACTIVITIES: CPT

## 2021-03-08 PROCEDURE — 36415 COLL VENOUS BLD VENIPUNCTURE: CPT

## 2021-03-08 PROCEDURE — 74011000250 HC RX REV CODE- 250: Performed by: ORTHOPAEDIC SURGERY

## 2021-03-08 PROCEDURE — 97165 OT EVAL LOW COMPLEX 30 MIN: CPT | Performed by: OCCUPATIONAL THERAPIST

## 2021-03-08 RX ORDER — SODIUM CHLORIDE 50 MG/ML
1 SOLUTION/ DROPS OPHTHALMIC AS NEEDED
Status: DISCONTINUED | OUTPATIENT
Start: 2021-03-08 | End: 2021-03-08

## 2021-03-08 RX ADMIN — POLYETHYLENE GLYCOL 3350 17 G: 17 POWDER, FOR SOLUTION ORAL at 09:35

## 2021-03-08 RX ADMIN — INSULIN LISPRO 5 UNITS: 100 INJECTION, SOLUTION INTRAVENOUS; SUBCUTANEOUS at 17:17

## 2021-03-08 RX ADMIN — Medication 10 ML: at 14:08

## 2021-03-08 RX ADMIN — INSULIN LISPRO 2 UNITS: 100 INJECTION, SOLUTION INTRAVENOUS; SUBCUTANEOUS at 12:05

## 2021-03-08 RX ADMIN — CYANOCOBALAMIN TAB 500 MCG 1000 MCG: 500 TAB at 09:37

## 2021-03-08 RX ADMIN — Medication 10 ML: at 22:00

## 2021-03-08 RX ADMIN — ACETAMINOPHEN 650 MG: 325 TABLET ORAL at 11:30

## 2021-03-08 RX ADMIN — Medication 1 TABLET: at 11:30

## 2021-03-08 RX ADMIN — Medication 10 ML: at 05:20

## 2021-03-08 RX ADMIN — ACETAMINOPHEN 650 MG: 325 TABLET ORAL at 17:19

## 2021-03-08 RX ADMIN — Medication 1 AMPULE: at 22:51

## 2021-03-08 RX ADMIN — TRAZODONE HYDROCHLORIDE 50 MG: 50 TABLET ORAL at 22:48

## 2021-03-08 RX ADMIN — ACETAMINOPHEN 650 MG: 325 TABLET ORAL at 05:19

## 2021-03-08 RX ADMIN — CEFAZOLIN SODIUM 2 G: 1 INJECTION, POWDER, FOR SOLUTION INTRAMUSCULAR; INTRAVENOUS at 14:05

## 2021-03-08 RX ADMIN — Medication 1 TABLET: at 17:19

## 2021-03-08 RX ADMIN — SODIUM CHLORIDE 125 ML/HR: 9 INJECTION, SOLUTION INTRAVENOUS at 05:19

## 2021-03-08 RX ADMIN — HEPARIN SODIUM 5000 UNITS: 5000 INJECTION INTRAVENOUS; SUBCUTANEOUS at 09:41

## 2021-03-08 RX ADMIN — INSULIN LISPRO 2 UNITS: 100 INJECTION, SOLUTION INTRAVENOUS; SUBCUTANEOUS at 22:39

## 2021-03-08 RX ADMIN — ALLOPURINOL 100 MG: 100 TABLET ORAL at 09:41

## 2021-03-08 RX ADMIN — DOCUSATE SODIUM 50MG AND SENNOSIDES 8.6MG 1 TABLET: 8.6; 5 TABLET, FILM COATED ORAL at 17:18

## 2021-03-08 RX ADMIN — CEFAZOLIN SODIUM 2 G: 1 INJECTION, POWDER, FOR SOLUTION INTRAMUSCULAR; INTRAVENOUS at 00:35

## 2021-03-08 RX ADMIN — Medication 1 AMPULE: at 10:00

## 2021-03-08 RX ADMIN — HEPARIN SODIUM 5000 UNITS: 5000 INJECTION INTRAVENOUS; SUBCUTANEOUS at 17:17

## 2021-03-08 RX ADMIN — DOCUSATE SODIUM 50MG AND SENNOSIDES 8.6MG 1 TABLET: 8.6; 5 TABLET, FILM COATED ORAL at 09:37

## 2021-03-08 RX ADMIN — Medication 1 TABLET: at 09:40

## 2021-03-08 RX ADMIN — PREGABALIN 50 MG: 25 CAPSULE ORAL at 09:38

## 2021-03-08 NOTE — PROGRESS NOTES
End of Shift Note    Bedside shift change report given to Una Fortune RN (oncoming nurse) by Grey Vasquez (offgoing nurse). Report included the following information SBAR, Kardex, OR Summary, Procedure Summary, Intake/Output, MAR, Accordion, Recent Results and Cardiac Rhythm NSR/ Stach    Shift worked:  3435-6048     Shift summary and any significant changes:     more alert from previously recorded     Concerns for physician to address:  none     Zone phone for oncoming shift:          Activity:  Activity Level: Up with Assistance  Number times ambulated in hallways past shift: 0  Number of times OOB to chair past shift: 0    Cardiac:   Cardiac Monitoring: Yes      Cardiac Rhythm: Normal sinus rhythm    Access:   Current line(s): PIV     Genitourinary:   Urinary status: barnett    Respiratory:   O2 Device: Room air  Chronic home O2 use?: NO  Incentive spirometer at bedside: YES  Actual Volume (ml): 1500 ml  GI:  Last Bowel Movement Date: (prior to admit)  Current diet:  DIET DIABETIC CONSISTENT CARB Mechanical Soft  Passing flatus: YES  Tolerating current diet: YES  % Diet Eaten: 0 %    Pain Management:   Patient states pain is manageable on current regimen: YES    Skin:  Aguila Score: 15  Interventions: turn team, float heels, increase time out of bed and PT/OT consult    Patient Safety:  Fall Score:  Total Score: 5  Interventions: bed/chair alarm, assistive device (walker, cane, etc) and pt to call before getting OOB  High Fall Risk: Yes    Length of Stay:  Expected LOS: 4d 7h  Actual LOS: [facility]

## 2021-03-08 NOTE — PROGRESS NOTES
Orthopedic NP Progress Note  Post Op day: 1 Day Post-Op    March 8, 2021 10:06 AM     Yuannaif Zuniga    Attending Physician: Treatment Team: Attending Provider: Lynda Marino DO; Consulting Provider: Edison Reza NP; Consulting Provider: John Rodriguez MD; Consulting Provider: Arnaldo Gibbons DO; Consulting Provider: Mellisa Johnston MD; Consulting Provider: Allan Hayes MD; Consulting Provider: Jeremias Figueroa MD; Consulting Provider: Esha Etienne MD; Utilization Review: Rolf Balderas; Care Manager: Tracie Shultz; Student Nurse: Kelley Jauregui; Staff Nurse: Courtney Juares     Vital Signs:    Patient Vitals for the past 8 hrs:   BP Temp Pulse Resp SpO2 Weight   03/08/21 0807 125/64 99 °F (37.2 °C) 90 17 100 %    03/08/21 0307 (!) 118/58 98.8 °F (37.1 °C) 98 18 95 % 76.4 kg (168 lb 7 oz)     BMI (calculated): 27.2 (03/08/21 0307)    Intake/Output:  No intake/output data recorded. 03/06 1901 - 03/08 0700  In: 1940.8 [P.O.:200; I.V.:1740.8]  Out: 3000 [Urine:3000]    Pain Control:   Pain Assessment  Pain Scale 1: Numeric (0 - 10)  Pain Intensity 1: 0  Pain Location 1: Hip  Pain Orientation 1: Right    LAB:    Recent Labs     03/08/21  0138   HCT 23.5*   HGB 7.5*     Lab Results   Component Value Date/Time    Sodium 136 03/08/2021 01:38 AM    Potassium 4.2 03/08/2021 01:38 AM    Chloride 106 03/08/2021 01:38 AM    CO2 23 03/08/2021 01:38 AM    Glucose 115 (H) 03/08/2021 01:38 AM    BUN 28 (H) 03/08/2021 01:38 AM    Creatinine 2.44 (H) 03/08/2021 01:38 AM    Calcium 7.7 (L) 03/08/2021 01:38 AM       Subjective:  Christiano Zuniga is a 80 y.o. male s/p a  Procedure(s):  RIGHT FEMUR INSERTION INTRA MEDULLARY NAIL (URGENT)   Procedure(s):  RIGHT FEMUR INSERTION INTRA MEDULLARY NAIL (URGENT). Tolerating diet. Resting in bed, no c/o's       Objective: General: alert, cooperative, no distress. Neuro/Vascular: CNS Intact. Sensation stable.  Brisk cap refill, + pulses UE/LE  Musculoskeletal:  +ROM UE/LE. Skin: Incision - clean, dry and intact. No significant erythema or swelling.     Dressing: small amount of dry drainage noted    Alvarez - y   Drain - n       PT/OT:   Gait:                      Assessment:    s/p Procedure(s):  RIGHT FEMUR INSERTION INTRA MEDULLARY NAIL (URGENT)    Active Problems:    Hyperkalemia (3/5/2021)      Hip fracture ([facility] [address] (3/5/2021)      Seizure ([facility] [address] (3/5/2021)      LUCY (acute kidney injury) ([facility] [address] (3/5/2021)         Plan:   -  Continue PT/OT - WBAT   -  Continue established methods of pain control  -  VTE Prophylaxes - TEDS &/or SCDs with heparin   -  Acute Blood Loss Anemia - hgb 7.5 today, will follow   -  Alvarez for I&O as per Nephrology request       Discharge To:  [facility]  Signed By: Gretchen Cerna NP    Orthopedic Nurse Practitioner

## 2021-03-08 NOTE — PROGRESS NOTES
Problem: Self Care Deficits Care Plan (Adult)  Goal: *Acute Goals and Plan of Care (Insert Text)  Description:     FUNCTIONAL STATUS PRIOR TO ADMISSION: Ambulates with a rollator and is independent for all ADL performance. He has fallen multiple times stepping up curb to walk on sidewalk to his apartment. HOME SUPPORT: The patient lived with his s/o. Occupational Therapy Goals  Initiated 3/8/2021  1. Patient will perform grooming seated EOB with supervision/set-up within 7 day(s). 2.  Patient will perform upper body dressing with minimal assistance  within 7 day(s). 3.  Patient will perform lower body dressing with moderate assistance, using AE PRN, within 7 day(s). 4.  Patient will perform toilet transfers with minimal assistance/contact guard assist within 7 day(s). 5.  Patient will perform all aspects of toileting with moderate assistance  within 7 day(s). 6.  Patient will perform ADL setup with moderate assistance  within 7 day(s). Outcome: Progressing Towards Goal      OCCUPATIONAL THERAPY EVALUATION  Patient: Christiano Zuniga (89 y.o. male)  Date: 3/8/2021  Primary Diagnosis: Hip fracture ([facility] [address] [S72.009A]  Seizure ([address] [R56.9]  LUCY (acute kidney injury) ([facility] [address] [N17.9]  Hyperkalemia [E87.5]  Procedure(s) (LRB):  RIGHT FEMUR INSERTION INTRA MEDULLARY NAIL (URGENT) (Right) 1 Day Post-Op   Precautions: Falls, WBAT on RLE       ASSESSMENT  Based on the objective data described below, the patient presents with R hip pain s/p IMN of fx, acute confusion, GW, decreased activity tolerance, decreased safety awareness and decreased balance which is impairing his functional independence. The patient is functioning below his independent to mod I baseline, now performing ADLs at a mod A to total A level and is mod A of 2 to max A of 2 for functional mobility. The patient will benefit from acute OT intervention and will need SNF rehab after discharge. Functional Outcome Measure:   The patient scored 10/100 on the Barthel Index outcome measure which is indicative of a 90% impairment in function. PLAN :  Recommendations and Planned Interventions: self care training, functional mobility training, therapeutic exercise, balance training, therapeutic activities, cognitive retraining, endurance activities, patient education, home safety training, and family training/education    Frequency/Duration: Patient will be followed by occupational therapy 5 times a week to address goals. Recommendation for discharge: (in order for the patient to meet his/her long term goals)  Therapy up to 5 days/week in SNF setting    Equipment recommendations for successful discharge (if) home: TBD       OBJECTIVE DATA SUMMARY:   HISTORY:   Past Medical History:   Diagnosis Date    Chronic kidney disease     Dementia ([facility] [address]     Diabetes ([facility] [address]     Gastrointestinal disorder     Glaucoma     Gout     Hypercholesteremia     Hypertension     Other ill-defined conditions(799.89)     gout, glaucoma    Other ill-defined conditions(799.89)     high cholesterol    Seizures ([address]     Possible seizure 3/6 following administration of fentanyl     Past Surgical History:   Procedure Laterality Date    HX ORTHOPAEDIC      cervical surgery       Expanded or extensive additional review of patient history:     Home Situation  Home Environment: Apartment  One/Two Story Residence: One story  Living Alone: No  Support Systems: Family member(s), Friends \ neighbors  Patient Expects to be Discharged to[de-identified] Private residence  Current DME Used/Available at Home: Donnamae Manjeet, rollator, Cane, straight  Tub or Shower Type: Tub/Shower combination    Hand dominance: Right    EXAMINATION OF PERFORMANCE DEFICITS:  Cognitive/Behavioral Status:  Neurologic State: Alert, confused  Orientation Level: Oriented to person;Oriented to place;Oriented to situation;Disoriented to time  Cognition: Follows commands;Memory loss; Impaired decision making Hearing: Auditory  Auditory Impairment: Hard of hearing, bilateral  Hearing Aids/Status: Does not own    Vision/Perceptual:    WFL wearing bifocals     Range of Motion:  AROM: Generally decreased, functional    Strength:  Strength: Generally decreased, functional    Coordination:  Coordination: Generally decreased, functional          Balance:  Sitting: Impaired  Sitting - Static: Fair (occasional)  Sitting - Dynamic: Fair (occasional)  Standing: Impaired  Standing - Static: Constant support; Fair  Standing - Dynamic : Constant support; Fair    Functional Mobility and Transfers for ADLs:  Bed Mobility:  Rolling: Moderate assistance;Assist x2  Supine to Sit: Moderate assistance;Assist x2  Sit to Supine: Maximum assistance;Assist x2    Transfers:  Sit to Stand: Moderate assistance;Assist x2  Stand to Sit: Moderate assistance;Assist x2  Bed to Chair: (unable to sidestep)    ADL Assessment:  Feeding: Moderate assistance    Oral Facial Hygiene/Grooming: Moderate assistance    Bathing: Maximum assistance    Upper Body Dressing: Maximum assistance    Lower Body Dressing: Total assistance    Toileting: Total assistance              Functional Measure:  Barthel Index:    Bathin  Bladder: 0  Bowels: 5  Groomin  Dressin  Feedin  Mobility: 0  Stairs: 0  Toilet Use: 0  Transfer (Bed to Chair and Back): 5  Total: 10/100        Percentage of impairment   0%   1-19%   20-39%   40-59%   60-79%   80-99%   100%   Barthel Score 0-100 100 99-80 79-60 59-40 20-39 1-19   0     The Barthel ADL Index: Guidelines  1. The index should be used as a record of what a patient does, not as a record of what a patient could do. 2. The main aim is to establish degree of independence from any help, physical or verbal, however minor and for whatever reason. 3. The need for supervision renders the patient not independent. 4. A patient's performance should be established using the best available evidence.  Asking the patient, friends/relatives and nurses are the usual sources, but direct observation and common sense are also important. However direct testing is not needed. 5. Usually the patient's performance over the preceding 24-48 hours is important, but occasionally longer periods will be relevant. 6. Middle categories imply that the patient supplies over 50 per cent of the effort. 7. Use of aids to be independent is allowed. Marbella Plam., Barthel, DTurnerW. (2939). Functional evaluation: the Barthel Index. [address] [facility] (14)2. CARLOS Chambers, Sonny Coelho., Wing Pendleton., Ashley Rodriguez, [address] [facility] (1999). Measuring the change indisability after inpatient rehabilitation; comparison of the responsiveness of the Barthel Index and Functional [place] Measure. Journal of Neurology, Neurosurgery, and Psychiatry, 66(4), 361-375. Reza Miles, N.J.A, SAEED Eason, & Ismael To M.A. (2004.) Assessment of post-stroke quality of life in cost-effectiveness studies: The usefulness of the Barthel Index and the EuroQoL-5D. Quality of Life Research, 13, 427-43        Pain Rating:  RLE pain during bed mobility and sit to stand transfers. Activity Tolerance:   Poor  VSS with exception of HR as high as 140 when in standing. After treatment patient left in no apparent distress:    Supine in bed and nursing preparing to transport patient to new room on ortho floor     COMMUNICATION/EDUCATION:   The patients plan of care was discussed with: Physical Therapist and Registered Nurse. Patient is unable to participate in goal setting and plan of care. This patients plan of care is appropriate for delegation to [facility] [place].     Thank you for this referral.  Timothy Elliott, OTR/L  Time Calculation: 34 mins

## 2021-03-08 NOTE — PROGRESS NOTES
Pharmacy - Enoxaparin (Lovenox®) Monitoring    Indication: VTE prophylaxis  Current Dose: Enoxaparin 40 mg subcutaneously every 24 hours  Creatinine Clearance (mL/min): 19 ml/min  Current Weight: 74.9 kg    Impression/Plan:   Change to heparin 5000 units SC q8hr per protocol for CrCl <20 ml/min       ThanksGeri, PHARMD

## 2021-03-08 NOTE — PROGRESS NOTES
End of Shift Note    Bedside shift change report given to Kelly Cao (oncoming nurse) by Marlene Granda RN (offgoing nurse). Report included the following information SBAR, Kardex, Intake/Output, MAR and Recent Results    Shift worked:  7p-7a     Shift summary and any significant changes:    Uneventful shift   Concerns for physician to address:       Zone phone for oncoming shift:          Activity:  Activity Level: Bed Rest  Number times ambulated in hallways past shift: 0  Number of times OOB to chair past shift: 0    Cardiac:   Cardiac Monitoring: Yes      Cardiac Rhythm: Normal sinus rhythm    Access:   Current line(s): PIV     Genitourinary:   Urinary status: barnett    Respiratory:   O2 Device: Room air  Chronic home O2 use?: NO  Incentive spirometer at bedside: YES     GI:  Last Bowel Movement Date: (prior to admit)  Current diet:  No diet orders on file  Passing flatus: YES  Tolerating current diet: YES  % Diet Eaten: 0 %    Pain Management:   Patient states pain is manageable on current regimen: YES    Skin:  Aguila Score: 15  Interventions: turn team, speciality bed, float heels, increase time out of bed, PT/OT consult, limit briefs and internal/external urinary devices    Patient Safety:  Fall Score:  Total Score: 4  Interventions: bed/chair alarm, assistive device (walker, cane, etc), gripper socks, pt to call before getting OOB and stay with me (per policy)  High Fall Risk: Yes    Length of Stay:  Expected LOS: - - -  Actual LOS: [address], RN

## 2021-03-08 NOTE — PROGRESS NOTES
Hospitalist Progress Note    NAME: Flakita Melchor   :  10/6/1932   MRN:  [id number]       Assessment / Plan:  Mechanical fall at home  Right hip fracture  -Ortho on board, plan for OR repair today  -Postop pain control and DVT PPx as per Ortho  -We will need PT/OT  X-ray hip 3 view shows fracture of the right hipcomminuted intertrochanteric fracture of the right hip  -CT head shows no acute abnormality. -Appreciate cardiology consult  -Echo noted. LVEF 40-01%, normal systolic function    Hyperkalemia  LUCY  -Hyperkalemia resolved with Kayexalate and Veltassa  -Appreciate nephrology input  -Avoid NSAIDs  -Creatinine stable     Acute seizure episode  -Secondary to medication usefentanyl.  -Narcan was given in the ER along with diazepam.  Patient blood pressure dropped and the fluids was given, currently vitals are stable. -Currently stable, alert and oriented x3. CT head negative. Neurology consulted, EEG pending.  -Keppra twice daily, further recommendations waiting on the neurology.     Diabetes type 2  -Hold home meds  -SSI with POC's    Neuropathy       Code Status: Full code-   Surrogate Decision Maker: Sánchez Lucero     DVT Prophylaxis:   GI Prophylaxis: not indicated     Baseline: Ambulatory with assistancewalker at home     Subjective:     Chief Complaint / Reason for Physician Visit  Patient in no acute distress. Resting in bed. Plan for OR for repair of hip fracture later today    Discussed with RN events overnight. Review of Systems:  Symptom Y/N Comments  Symptom Y/N Comments   Fever/Chills n   Chest Pain n    Poor Appetite n   Edema     Cough n   Abdominal Pain n    Sputum n   Joint Pain y Rt groin/hip   SOB/BARRON n   Pruritis/Rash     Nausea/vomit n   Tolerating PT/OT     Diarrhea n   Tolerating Diet     Constipation n   Other       Could NOT obtain due to:      Objective:     VITALS:   Last 24hrs VS reviewed since prior progress note.  Most recent are:  Patient Vitals for the past 24 hrs:   Temp Pulse Resp BP SpO2   03/07/21 1800  98 22 137/75 100 %   03/07/21 1730  86 9 (!) 155/72 100 %   03/07/21 1700  84 11 137/72 99 %   03/07/21 1630  90 13 119/81    03/07/21 1615  80 11 115/62    03/07/21 1600  82 11 116/62    03/07/21 1545  82 13 126/63 96 %   03/07/21 1523 97.6 °F (36.4 °C) 83 13 132/71 100 %   03/07/21 1500 98.8 °F (37.1 °C) 81 14 131/66 99 %   03/07/21 1445  79 13 (!) 142/68 100 %   03/07/21 1440  77 15 127/72 100 %   03/07/21 1435  76 15 138/76 100 %   03/07/21 1430  75 17 (!) 148/80 100 %   03/07/21 1425  79 16 (!) 141/84 100 %   03/07/21 1423 98.6 °F (37 °C) 80 10 (!) 160/98 100 %   03/07/21 1422 98.6 °F (37 °C) 80 10 (!) 160/98 100 %   03/07/21 1306 97.9 °F (36.6 °C) 78 19 (!) 160/81 100 %   03/07/21 1045 98.9 °F (37.2 °C) 83 21 (!) 160/84 96 %   03/07/21 0702 98.5 °F (36.9 °C) 79 16 135/78 99 %   03/07/21 0329 98.5 °F (36.9 °C) 75 19 (!) 141/76 100 %   03/07/21 0003 98.3 °F (36.8 °C) 72 21 (!) 144/89 97 %       Intake/Output Summary (Last 24 hours) at 3/7/2021 2003  Last data filed at 3/7/2021 1948  Gross per 24 hour   Intake 683.33 ml   Output 2350 ml   Net -1666.67 ml        I had a face to face encounter and independently examined this patient on 3/7/2021, as outlined below:  PHYSICAL EXAM:  General: WD, WN. Alert, cooperative, no acute distress    EENT:  EOMI. Anicteric sclerae. MMM  Resp:  CTA bilaterally, no wheezing or rales. No accessory muscle use  CV:  Regular  rhythm,  No edema  GI:  Soft, Non distended, Non tender. +Bowel sounds  Neurologic:  Alert and oriented X 3, normal speech,   Psych:   Not anxious nor agitated  Skin:  No rashes.   No jaundice    Reviewed most current lab test results and cultures  YES  Reviewed most current radiology test results   YES  Review and summation of old records today    NO  Reviewed patient's current orders and MAR    YES  PMH/SH reviewed - no change compared to H&P  ________________________________________________________________________  Care Plan discussed with:    Comments   Patient x    Family  x son   RN x    Care Manager     Consultant                        Multidiciplinary team rounds were held today with , nursing, pharmacist and clinical coordinator. Patient's plan of care was discussed; medications were reviewed and discharge planning was addressed. ________________________________________________________________________  Total NON critical care TIME:  25   Minutes    Total CRITICAL CARE TIME Spent:   Minutes non procedure based      Comments   >50% of visit spent in counseling and coordination of care x    ________________________________________________________________________  Beth Royal, DO     Procedures: see electronic medical records for all procedures/Xrays and details which were not copied into this note but were reviewed prior to creation of Plan. LABS:  I reviewed today's most current labs and imaging studies.   Pertinent labs include:  Recent Labs     03/06/21  0324 03/05/21  1650   WBC 8.3 5.4   HGB 9.6* 11.7*   HCT 29.3* 35.4*   * 175     Recent Labs     03/07/21  0317 03/06/21  0324 03/05/21  1650   *  136 132* 134*   K 4.0  4.1 5.9* 5.7*     107 105 104   CO2 24  23 21 27   GLU 99  99 156* 84   BUN 25*  25* 26* 26*   CREA 2.40*  2.40* 2.62* 2.83*   CA 8.0*  8.0* 8.4* 8.9   MG  --  1.9  --    PHOS 3.6  --   --    ALB 3.0*  --  3.6   TBILI  --   --  0.4   ALT  --   --  24   INR  --   --  1.1       Signed: Abdifatah Alvarez, DO

## 2021-03-08 NOTE — PROGRESS NOTES
Problem: Mobility Impaired (Adult and Pediatric)  Goal: *Acute Goals and Plan of Care (Insert Text)  Description: FUNCTIONAL STATUS PRIOR TO ADMISSION: Patient was ambulatory with rollator at home and in community, but had had a few falls stepping up on curb in parking lot. HOME SUPPORT PRIOR TO ADMISSION: Patient lives with his girlfriend, was indep with ADLs    Physical Therapy Goals  Initiated 3/8/2021  1. Patient will move from supine to sit and sit to supine  in bed with minimal assistance/contact guard assist within 7 day(s). 2.  Patient will transfer from bed to chair and chair to bed with minimal assistance/contact guard assist using the least restrictive device within 7 day(s). 3.  Patient will perform sit to stand with minimal assistance/contact guard assist within 7 day(s). 4.  Patient will ambulate with minimal assistance/contact guard assist for 100 feet with the least restrictive device within 7 day(s). Outcome: Not Met   PHYSICAL THERAPY EVALUATION  Patient: Krysta Shay (75 y.o. male)  Date: 3/8/2021  Primary Diagnosis: Hip fracture ([facility] [S72.009A]  Seizure ([facility] [R56.9]  LUCY (acute kidney injury) ([facility] [N17.9]  Hyperkalemia [E87.5]  Procedure(s) (LRB):  RIGHT FEMUR INSERTION INTRA MEDULLARY NAIL (URGENT) (Right) 1 Day Post-Op   Precautions: falls, WBAT       ASSESSMENT  Based on the objective data described below, the patient presents with general weakness, expected post op pain, decreased tolerance of activity, confusion, and impaired functional mobility following admission for hip fracture with IM nailing. Patient received in bed and agreeable to participate, can follow simple commands but was confused for most of visit. Grandson also present in room and reports that patient is fully oriented at baseline.   Patient required mod to max assist for bed mobility and transfers, was able to come to stand to RW but could not unweight left LE to sidestep and HR increased to 140 bpm so returned to supine. Patient is well below baseline and will likely require rehab in SNF before returning home. Current Level of Function Impacting Discharge (mobility/balance): mod to max assist x 2 for mobility    Functional Outcome Measure: The patient scored 10/100 on the Barthel  outcome measure which is indicative of severe functional impairment     Other factors to consider for discharge: high falls risk, lives with girlfriend, confusion     Patient will benefit from skilled therapy intervention to address the above noted impairments. PLAN :  Recommendations and Planned Interventions: bed mobility training, transfer training, gait training, therapeutic exercises, patient and family training/education, and therapeutic activities      Frequency/Duration: Patient will be followed by physical therapy:  daily to address goals. Recommendation for discharge: (in order for the patient to meet his/her long term goals)  Therapy up to 5 days/week in SNF setting    This discharge recommendation:  A follow-up discussion with the attending provider and/or case management is planned    IF patient discharges home will need the following DME: rolling walker         SUBJECTIVE:   Patient stated I see Vani Tripathi.   needed frequent re-orientation to grandson sitting in room    OBJECTIVE DATA SUMMARY:   HISTORY:    Past Medical History:   Diagnosis Date    Chronic kidney disease     Dementia ([facility]     Diabetes ([facility]     Gastrointestinal disorder     Glaucoma     Gout     Hypercholesteremia     Hypertension     Other ill-defined conditions(799.89)     gout, glaucoma    Other ill-defined conditions(799.89)     high cholesterol    Seizures (HCC)     Possible seizure 3/6 following administration of fentanyl     Past Surgical History:   Procedure Laterality Date    HX ORTHOPAEDIC      cervical surgery       Personal factors and/or comorbidities impacting plan of care: falls, CKD, had seizure in ED    [address] [facility] Environment: Apartment  One/Two Story Residence: One story  Living Alone: No  Support Systems: Family member(s), Friends \ neighbors  Patient Expects to be Discharged to:: Private residence  Current DME Used/Available at Home: Walker, rollator, Cane, straight  Tub or Shower Type: Tub/Shower combination    EXAMINATION/PRESENTATION/DECISION MAKING:   Critical Behavior:  Neurologic State: Alert  Orientation Level: Oriented to person, Oriented to place, Oriented to situation, Oriented to time  Cognition: Follows commands, Memory loss, Impaired decision making     Hearing:  Auditory  Auditory Impairment: Hard of hearing, bilateral  Hearing Aids/Status: Does not own  Range Of Motion:  AROM: Generally decreased, functional                       Strength:    Strength: Generally decreased, functional                    Tone & Sensation:                                  Coordination:  Coordination: Generally decreased, functional  Vision:      Functional Mobility:  Bed Mobility:  Rolling: Moderate assistance;Assist x2  Supine to Sit: Moderate assistance;Assist x2  Sit to Supine: Maximum assistance;Assist x2     Transfers:  Sit to Stand: Moderate assistance;Assist x2  Stand to Sit: Moderate assistance;Assist x2        Bed to Chair: (unable to sidestep)              Balance:   Sitting: Impaired  Sitting - Static: Fair (occasional)  Sitting - Dynamic: Fair (occasional)  Standing: Impaired  Standing - Static: Constant support;Fair  Standing - Dynamic : Constant support;Fair  Ambulation/Gait Training:              Gait Description (WDL): (did not attempt,  in standing)                                          Stairs:              Therapeutic Exercises:   Ankle pumps, heel slides, glute sets x 10    Functional Measure:  Barthel Index:    Bathin  Bladder: 0  Bowels: 5  Groomin  Dressin  Feedin  Mobility: 0  Stairs: 0  Toilet Use: 0  Transfer (Bed to Chair and Back): 5  Total: 10/100       The Barthel ADL Index:  Guidelines  1. The index should be used as a record of what a patient does, not as a record of what a patient could do. 2. The main aim is to establish degree of independence from any help, physical or verbal, however minor and for whatever reason. 3. The need for supervision renders the patient not independent. 4. A patient's performance should be established using the best available evidence. Asking the patient, friends/relatives and nurses are the usual sources, but direct observation and common sense are also important. However direct testing is not needed. 5. Usually the patient's performance over the preceding 24-48 hours is important, but occasionally longer periods will be relevant. 6. Middle categories imply that the patient supplies over 50 per cent of the effort. 7. Use of aids to be independent is allowed. Lucas Garcia., Barthel, D.W. (9998). Functional evaluation: the Barthel Index. [address] [facility] (14)2. Derrell Figueroa kirt CARLOS Calderon, Israel Estevez., [facility]., [place], [address] [place] [address] (1999). Measuring the change indisability after inpatient rehabilitation; comparison of the responsiveness of the Barthel Index and Functional [place] Measure. Journal of Neurology, Neurosurgery, and Psychiatry, 66(4), 171-611. Rukhsana Rey, N.J.A, SAEED Eason, & Lex Enriquez, M.A. (2004.) Assessment of post-stroke quality of life in cost-effectiveness studies: The usefulness of the Barthel Index and the EuroQoL-5D.  Quality of Life Research, 15, 230-84           Physical Therapy Evaluation Charge Determination   History Examination Presentation Decision-Making   MEDIUM  Complexity : 1-2 comorbidities / personal factors will impact the outcome/ POC  LOW Complexity : 1-2 Standardized tests and measures addressing body structure, function, activity limitation and / or participation in recreation  LOW Complexity : Stable, uncomplicated  LOW Complexity : FOTO score of       Based on the above components, the patient evaluation is determined to be of the following complexity level: LOW     Pain Rating:      Activity Tolerance:   Fair, requires rest breaks, and HR elevated with activity    After treatment patient left in no apparent distress:   Supine in bed, Call bell within reach, and Side rails x 3    COMMUNICATION/EDUCATION:   The patients plan of care was discussed with: Occupational therapist and Registered nurse. Fall prevention education was provided and the patient/caregiver indicated understanding. and Patient/family agree to work toward stated goals and plan of care.     Thank you for this referral.  Drew San, PT   Time Calculation: 32 mins

## 2021-03-08 NOTE — PROGRESS NOTES
Hospitalist Progress Note    NAME: Seth See   :  10/6/1932   MRN:  [id number]       Assessment / Plan:  Mechanical fall at home  Right hip fracture  -POD 1 Rt hip IMN for intertrochanteric hip fracture  -Postop pain control and DVT PPx as per Ortho  -PT/OT  X-ray hip: comminuted intertrochanteric fracture of the right hip  -CT head shows no acute abnormality. -Appreciate cardiology consult  -Echo noted. LVEF 81-36%, normal systolic function  -stable post op    Hyperkalemia  LUCY  -Hyperkalemia resolved with Kayexalate and Veltassa  -Appreciate nephrology input  -Avoid NSAIDs  -Creatinine stable     Acute seizure episode  -Secondary to medication usefentanyl.  -Narcan was given in the ER along with diazepam.  Patient blood pressure dropped and the fluids was given, currently vitals are stable.  -no further episodes since ER  CT head negative  -DC keppra  -neuro consult appreciated     Diabetes type 2  Neuropathy  -Hold home meds  -SSI with POC's     Code Status: Full code  Surrogate Decision Maker: Thomas Lucero     DVT Prophylaxis:   GI Prophylaxis: not indicated     Baseline: Ambulatory with assistancewalker at home     Subjective:     Chief Complaint / Reason for Physician Visit  Patient stable post op. No acute complaints. Pain well contolled    Discussed with RN events overnight. Review of Systems:  Symptom Y/N Comments  Symptom Y/N Comments   Fever/Chills n   Chest Pain n    Poor Appetite n   Edema     Cough n   Abdominal Pain n    Sputum n   Joint Pain y Rt groin/hip   SOB/BARRON n   Pruritis/Rash     Nausea/vomit n   Tolerating PT/OT     Diarrhea n   Tolerating Diet     Constipation n   Other       Could NOT obtain due to:      Objective:     VITALS:   Last 24hrs VS reviewed since prior progress note.  Most recent are:  Patient Vitals for the past 24 hrs:   Temp Pulse Resp BP SpO2   21 0807 99 °F (37.2 °C) 90 17 125/64 100 %   21 0307 98.8 °F (37.1 °C) 98 18 (!) 118/58 95 %   03/07/21 2223 98 °F (36.7 °C) 93 10 120/64 94 %   03/07/21 2022 97.8 °F (36.6 °C) 98 10 129/65 98 %   03/07/21 1800  98 22 137/75 100 %   03/07/21 1730  86 9 (!) 155/72 100 %   03/07/21 1700  84 11 137/72 99 %   03/07/21 1630  90 13 119/81    03/07/21 1615  80 11 115/62    03/07/21 1600  82 11 116/62    03/07/21 1545  82 13 126/63 96 %   03/07/21 1523 97.6 °F (36.4 °C) 83 13 132/71 100 %   03/07/21 1500 98.8 °F (37.1 °C) 81 14 131/66 99 %   03/07/21 1445  79 13 (!) 142/68 100 %   03/07/21 1440  77 15 127/72 100 %   03/07/21 1435  76 15 138/76 100 %   03/07/21 1430  75 17 (!) 148/80 100 %   03/07/21 1425  79 16 (!) 141/84 100 %   03/07/21 1423 98.6 °F (37 °C) 80 10 (!) 160/98 100 %   03/07/21 1422 98.6 °F (37 °C) 80 10 (!) 160/98 100 %   03/07/21 1306 97.9 °F (36.6 °C) 78 19 (!) 160/81 100 %   03/07/21 1045 98.9 °F (37.2 °C) 83 21 (!) 160/84 96 %       Intake/Output Summary (Last 24 hours) at 3/8/2021 0951  Last data filed at 3/8/2021 0252  Gross per 24 hour   Intake 1940.83 ml   Output 1900 ml   Net 40.83 ml        I had a face to face encounter and independently examined this patient on 3/8/2021, as outlined below:  PHYSICAL EXAM:  General: WD, WN. Alert, cooperative, no acute distress    EENT:  EOMI. Anicteric sclerae. MMM  Resp:  CTA bilaterally, no wheezing or rales. No accessory muscle use  CV:  Regular  rhythm,  No edema  GI:  Soft, Non distended, Non tender. +Bowel sounds  Neurologic:  Alert and oriented X 3, normal speech,   Psych:   Not anxious nor agitated  Skin:  No rashes.   No jaundice    Reviewed most current lab test results and cultures  YES  Reviewed most current radiology test results   YES  Review and summation of old records today    NO  Reviewed patient's current orders and MAR    YES  PMH/ reviewed - no change compared to H&P  ________________________________________________________________________  Care Plan discussed with:    Comments   Patient x    Family  x son   RN x    Care Manager     Consultant                        Multidiciplinary team rounds were held today with , nursing, pharmacist and clinical coordinator. Patient's plan of care was discussed; medications were reviewed and discharge planning was addressed. ________________________________________________________________________  Total NON critical care TIME:  25   Minutes    Total CRITICAL CARE TIME Spent:   Minutes non procedure based      Comments   >50% of visit spent in counseling and coordination of care x    ________________________________________________________________________  Millerrom Chavez DO     Procedures: see electronic medical records for all procedures/Xrays and details which were not copied into this note but were reviewed prior to creation of Plan. LABS:  I reviewed today's most current labs and imaging studies.   Pertinent labs include:  Recent Labs     03/08/21  0138 03/06/21  0324 03/05/21  1650   WBC 8.1 8.3 5.4   HGB 7.5* 9.6* 11.7*   HCT 23.5* 29.3* 35.4*   * 127* 175     Recent Labs     03/08/21  0138 03/07/21  0317 03/06/21  0324 03/05/21  1650    134*  136 132* 134*   K 4.2 4.0  4.1 5.9* 5.7*    105  107 105 104   CO2 23 24  23 21 27   * 99  99 156* 84   BUN 28* 25*  25* 26* 26*   CREA 2.44* 2.40*  2.40* 2.62* 2.83*   CA 7.7* 8.0*  8.0* 8.4* 8.9   MG  --   --  1.9  --    PHOS  --  3.6  --   --    ALB  --  3.0*  --  3.6   TBILI  --   --   --  0.4   ALT  --   --   --  24   INR  --   --   --  1.1       Signed: Yvonne Alvarez DO

## 2021-03-08 NOTE — PROGRESS NOTES
Nephrology Progress Note  Johnnie Milian     [facility]. [facility]  Phone - [phone]   Patient: Himanshu Walton    YOB: 1932        Date- 3/8/2021   Admit Date: 3/5/2021  CC: Follow up for  LUCY on CKD          IMPRESSION & PLAN:    LUCY on CKD    DM2   HTN   Hip fracture     PLAN-   Suspect LUCY on CKD.  Cr has been stable along with lytes   CKD sec to HTN and DM2, suspect  B/L Cr at 2.4 range.  Will continue to monitor,D/C IVF today   Continue Alvarez for now. Subjective: Interval History:   -  Seen this am. Feels better    Objective:   Vitals:    03/07/21 2022 03/07/21 2223 03/08/21 0307 03/08/21 0807   BP: 129/65 120/64 (!) 118/58 125/64   Pulse: 98 93 98 90   Resp: 10 10 18 17   Temp: 97.8 °F (36.6 °C) 98 °F (36.7 °C) 98.8 °F (37.1 °C) 99 °F (37.2 °C)   SpO2: 98% 94% 95% 100%   Weight:   76.4 kg (168 lb 7 oz)    Height:          03/07 0701 - 03/08 0700  In: 1940.8 [P.O.:200; I.V.:1740.8]  Out: 1900 [Urine:1900]  Last 3 Recorded Weights in this Encounter    03/06/21 1529 03/07/21 0312 03/08/21 0307   Weight: 76.7 kg (169 lb 1.5 oz) 74.9 kg (165 lb 3.2 oz) 76.4 kg (168 lb 7 oz)      Physical exam:   GEN: NAD  NECK- Supple, no mass  RESP: No wheezing, Clear b/l  CVS: S1,S2  RRR  NEURO: Normal speech, Non focal  EXT: No Edema       Chart reviewed. Pertinent Notes reviewed. Data Review :  Recent Labs     03/08/21  0138 03/07/21  0317 03/06/21  0324    134*  136 132*   K 4.2 4.0  4.1 5.9*    105  107 105   CO2 23 24  23 21   BUN 28* 25*  25* 26*   CREA 2.44* 2.40*  2.40* 2.62*   * 99  99 156*   CA 7.7* 8.0*  8.0* 8.4*   MG  --   --  1.9   PHOS  --  3.6  --      Recent Labs     03/08/21  0138 03/06/21  0324 03/05/21  1650   WBC 8.1 8.3 5.4   HGB 7.5* 9.6* 11.7*   HCT 23.5* 29.3* 35.4*   * 127* 175     No results for input(s): FE, TIBC, PSAT, FERR in the last 72 hours.    Medication list  reviewed  Current Facility-Administered Medications   Medication Dose Route Frequency    alcohol 62% (NOZIN) nasal  1 Ampule  1 Ampule Topical Q12H    sodium chloride (NS) flush 5-40 mL  5-40 mL IntraVENous Q8H    sodium chloride (NS) flush 5-40 mL  5-40 mL IntraVENous PRN    acetaminophen (TYLENOL) tablet 650 mg  650 mg Oral Q6H    naloxone (NARCAN) injection 0.4 mg  0.4 mg IntraVENous PRN    ondansetron (ZOFRAN ODT) tablet 4 mg  4 mg Oral Q4H PRN    ondansetron (ZOFRAN) injection 4 mg  4 mg IntraVENous Q4H PRN    calcium-vitamin D (OS-EDUARDO +D3) 500 mg-200 unit per tablet 1 Tab  1 Tab Oral TID WITH MEALS    senna-docusate (PERICOLACE) 8.6-50 mg per tablet 1 Tab  1 Tab Oral BID    polyethylene glycol (MIRALAX) packet 17 g  17 g Oral DAILY    [START ON 3/9/2021] bisacodyL (DULCOLAX) suppository 10 mg  10 mg Rectal DAILY PRN    morphine injection 1 mg  1 mg IntraVENous Q4H PRN    ceFAZolin (ANCEF) 2 g in sterile water (preservative free) 20 mL IV syringe  2 g IntraVENous Q12H    heparin (porcine) injection 5,000 Units  5,000 Units SubCUTAneous Q8H    oxyCODONE IR (ROXICODONE) tablet 2.5 mg  2.5 mg Oral Q4H PRN    allopurinoL (ZYLOPRIM) tablet 100 mg  100 mg Oral DAILY    pregabalin (LYRICA) capsule 50 mg  50 mg Oral DAILY    traZODone (DESYREL) tablet 50 mg  50 mg Oral QHS PRN    latanoprost (XALATAN) 0.005 % ophthalmic solution 1 Drop  1 Drop Both Eyes QHS    cyanocobalamin (VITAMIN B12) tablet 1,000 mcg  1,000 mcg Oral DAILY    acetaminophen (TYLENOL) tablet 650 mg  650 mg Oral Q6H PRN    Or    acetaminophen (TYLENOL) suppository 650 mg  650 mg Rectal Q6H PRN    polyethylene glycol (MIRALAX) packet 17 g  17 g Oral DAILY PRN    promethazine (PHENERGAN) tablet 12.5 mg  12.5 mg Oral Q6H PRN    insulin lispro (HUMALOG) injection   SubCUTAneous AC&HS    glucose chewable tablet 16 g  4 Tab Oral PRN    dextrose (D50W) injection syrg 12.5-25 g  12.5-25 g IntraVENous PRN    glucagon (GLUCAGEN) injection 1 mg  1 mg IntraMUSCular PRN          Margot Benjamin, [address] [facility]  [facility] / [facility]  [address], [address]  [place], [address]  Phone - [phone]               Fax - [phone]

## 2021-03-08 NOTE — OP NOTES
[address]  OPERATIVE REPORT    Name:  Emeterio Burroughs  MR#:  [id number]  :  10/06/1932  ACCOUNT #:  [de-identified]  DATE OF SERVICE:  2021    PREOPERATIVE DIAGNOSES:  Right intertrochanteric hip fracture with comminution. POSTOPERATIVE DIAGNOSES:  Right intertrochanteric hip fracture with comminution. PROCEDURE PERFORMED:  Right hip intramedullary nailing. SURGEON:  Tejinder Del Angel DO    ASSISTANT:  None. ANESTHESIA:  General.    COMPLICATIONS:  None. SPECIMENS REMOVED:  None. IMPLANTS:  Synthes size-11 intermediate TFNA device. ESTIMATED BLOOD LOSS:  200 mL. DISPOSITION:  Stable to PACU. INDICATIONS FOR THE PROCEDURE:  The patient is an 61-year-old male who presented to the hospital after a ground-level fall. He was found to have evidence of an intertrochanteric right hip fracture with comminution. Orthopedics was consulted for management of the fracture. He was admitted by a medical team for medical optimization and clearance. We initially had some issues with obtaining full clearance as Cardiology wished to obtain further studies and identified him as an intermediate risk. We had a long discussion with family regarding appropriate treatment options. They elected to proceed with right hip cephalomedullary nailing. Once he was medically optimized and cleared   surgery, we took him to the OR. He was seen in the preoperative holding area and his operative extremity was marked by Orthopedics. DESCRIPTION OF PROCEDURE:  The patient was taken to the OR and transferred to the operating room Bohannon table after he was given sedation and intubated by Anesthesia. Sterile prepping and draping was performed after we used x-ray to confirm appropriate fracture reduction and we ensured all prominences were carefully padded. After sterile prepping and draping, a time-out was called.   The patient was identified by name, date of birth, operative site, and operative procedure. After all were in agreement that the right hip was the operative extremity, an incision was made for an approach to the tip of the greater trochanter. Careful dissection down to avoid neurovascular structures was performed and we gained access to the tip of the greater trochanter with a guide pin. We inserted the guide pin under x-ray guidance to ensure appropriate entry into the canal.  We overreamed and then placed based on preoperative templating, a size-11 Synthes intermediate TFNA device. This was placed without difficulty. We used x-ray to confirm appropriate overall positioning for placement of our helical blade. We predrilled after making a small incision at the lateral hip replacement of our helical blade and we placed this over a guide pin. We ensured a tip to apex distance of less than 25 mm as we placed a helical blade and locked it into place after gaining some compression at the fracture site. We then placed our distal locking screw through our lateral incision and took final x-rays. After final x-rays were taken, we thoroughly irrigated and closed with combination of #1 Vicryl, 2-0 Vicryl, and staples. Sterile dressings were applied. The patient will be monitored closely in the recovery unit and kept in the hospital.  He will likely present to a rehab facility. We will start him on Lovenox for DVT prophylaxis.         Doc DO CAITLYN Lopez/[email or web address]  D:  03/07/2021 14:34  T:  03/07/2021 23:32  JOB #:  [id number]

## 2021-03-08 NOTE — PROGRESS NOTES
Problem: Falls - Risk of  Goal: *Absence of Falls  Description: Document Leafy Palmer Fall Risk and appropriate interventions in the flowsheet. Outcome: Progressing Towards Goal  Note: Fall Risk Interventions:  Mobility Interventions: Bed/chair exit alarm, Patient to call before getting OOB    Mentation Interventions: Adequate sleep, hydration, pain control, Bed/chair exit alarm    Medication Interventions: Bed/chair exit alarm, Patient to call before getting OOB, Teach patient to arise slowly    Elimination Interventions: Bed/chair exit alarm, Call light in reach, Toileting schedule/hourly rounds    History of Falls Interventions: Bed/chair exit alarm         Problem: Patient Education: Go to Patient Education Activity  Goal: Patient/Family Education  Outcome: Progressing Towards Goal     Problem: Infection - Risk of, Urinary Catheter-Associated Urinary Tract Infection  Goal: *Absence of infection signs and symptoms  Outcome: Progressing Towards Goal     Problem: Patient Education: Go to Patient Education Activity  Goal: Patient/Family Education  Outcome: Progressing Towards Goal     Problem: Pressure Injury - Risk of  Goal: *Prevention of pressure injury  Description: Document Aguila Scale and appropriate interventions in the flowsheet.   Outcome: Progressing Towards Goal  Note: Pressure Injury Interventions:  Sensory Interventions: Assess changes in LOC, Float heels, Keep linens dry and wrinkle-free    Moisture Interventions: Absorbent underpads, Limit adult briefs    Activity Interventions: Assess need for specialty bed, Increase time out of bed, PT/OT evaluation    Mobility Interventions: Assess need for specialty bed, Float heels, HOB 30 degrees or less    Nutrition Interventions: Document food/fluid/supplement intake, Offer support with meals,snacks and hydration                     Problem: Patient Education: Go to Patient Education Activity  Goal: Patient/Family Education  Outcome: Progressing Towards Goal

## 2021-03-08 NOTE — PROGRESS NOTES
3/8/2021 7:18 AM    Admit Date: 3/5/2021    Admit Diagnosis: Hip fracture ([facility]; Seizure ([facility] [R56.9]; [facility] [N17.9]; Hyperkalemia [E87.5]    Subjective:     S/p right hip surgery. No events.      Visit Vitals  BP (!) 118/58   Pulse 98   Temp 98.8 °F (37.1 °C)   Resp 18   Ht 5' 6\" (1.676 m)   Wt 168 lb 7 oz (76.4 kg)   SpO2 95%   BMI 27.19 kg/m²     Current Facility-Administered Medications   Medication Dose Route Frequency    alcohol 62% (NOZIN) nasal  1 Ampule  1 Ampule Topical Q12H    0.9% sodium chloride infusion  125 mL/hr IntraVENous CONTINUOUS    sodium chloride (NS) flush 5-40 mL  5-40 mL IntraVENous Q8H    sodium chloride (NS) flush 5-40 mL  5-40 mL IntraVENous PRN    acetaminophen (TYLENOL) tablet 650 mg  650 mg Oral Q6H    naloxone (NARCAN) injection 0.4 mg  0.4 mg IntraVENous PRN    ondansetron (ZOFRAN ODT) tablet 4 mg  4 mg Oral Q4H PRN    ondansetron (ZOFRAN) injection 4 mg  4 mg IntraVENous Q4H PRN    calcium-vitamin D (OS-EDUARDO +D3) 500 mg-200 unit per tablet 1 Tab  1 Tab Oral TID WITH MEALS    senna-docusate (PERICOLACE) 8.6-50 mg per tablet 1 Tab  1 Tab Oral BID    polyethylene glycol (MIRALAX) packet 17 g  17 g Oral DAILY    [START ON 3/9/2021] bisacodyL (DULCOLAX) suppository 10 mg  10 mg Rectal DAILY PRN    morphine injection 1 mg  1 mg IntraVENous Q4H PRN    ceFAZolin (ANCEF) 2 g in sterile water (preservative free) 20 mL IV syringe  2 g IntraVENous Q12H    heparin (porcine) injection 5,000 Units  5,000 Units SubCUTAneous Q8H    oxyCODONE IR (ROXICODONE) tablet 2.5 mg  2.5 mg Oral Q4H PRN    allopurinoL (ZYLOPRIM) tablet 100 mg  100 mg Oral DAILY    pregabalin (LYRICA) capsule 50 mg  50 mg Oral DAILY    traZODone (DESYREL) tablet 50 mg  50 mg Oral QHS PRN    latanoprost (XALATAN) 0.005 % ophthalmic solution 1 Drop  1 Drop Both Eyes QHS    cyanocobalamin (VITAMIN B12) tablet 1,000 mcg  1,000 mcg Oral DAILY    acetaminophen (TYLENOL) tablet 650 mg  650 mg Oral Q6H PRN    Or    acetaminophen (TYLENOL) suppository 650 mg  650 mg Rectal Q6H PRN    polyethylene glycol (MIRALAX) packet 17 g  17 g Oral DAILY PRN    promethazine (PHENERGAN) tablet 12.5 mg  12.5 mg Oral Q6H PRN    insulin lispro (HUMALOG) injection   SubCUTAneous AC&HS    glucose chewable tablet 16 g  4 Tab Oral PRN    dextrose (D50W) injection syrg 12.5-25 g  12.5-25 g IntraVENous PRN    glucagon (GLUCAGEN) injection 1 mg  1 mg IntraMUSCular PRN         Objective:      Visit Vitals  BP (!) 118/58   Pulse 98   Temp 98.8 °F (37.1 °C)   Resp 18   Ht 5' 6\" (1.676 m)   Wt 168 lb 7 oz (76.4 kg)   SpO2 95%   BMI 27.19 kg/m²       Physical Exam:  Resting comfortably. No resp distress.    Extremities: no cyanosis or edema  Heart: regular rate and rhythm, S1, S2 normal, no murmur, click, rub or gallop  Lungs: clear to auscultation bilaterally  Neurologic: Grossly normal    Data Review:   Labs:    Recent Results (from the past 24 hour(s))   GLUCOSE, POC    Collection Time: 03/07/21 10:55 AM   Result Value Ref Range    Glucose (POC) 106 (H) 65 - 100 mg/dL    Performed by David Marie    GLUCOSE, POC    Collection Time: 03/07/21  1:12 PM   Result Value Ref Range    Glucose (POC) 104 (H) 65 - 100 mg/dL    Performed by Echo Solis    GLUCOSE, POC    Collection Time: 03/07/21  2:31 PM   Result Value Ref Range    Glucose (POC) 111 (H) 65 - 100 mg/dL    Performed by [address] [place] [address], POC    Collection Time: 03/07/21  4:31 PM   Result Value Ref Range    Glucose (POC) 104 (H) 65 - 100 mg/dL    Performed by [id number] [address] Martinez , POC    Collection Time: 03/07/21  8:52 PM   Result Value Ref Range    Glucose (POC) 112 (H) 65 - 100 mg/dL    Performed by Elisabeth Bell PCT    METABOLIC PANEL, BASIC    Collection Time: 03/08/21  1:38 AM   Result Value Ref Range    Sodium 136 136 - 145 mmol/L    Potassium 4.2 3.5 - 5.1 mmol/L    Chloride 106 97 - 108 mmol/L    CO2 23 21 - 32 mmol/L    Anion gap 7 5 - 15 mmol/L    Glucose 115 (H) 65 - 100 mg/dL    BUN 28 (H) 6 - 20 MG/DL    Creatinine 2.44 (H) 0.70 - 1.30 MG/DL    BUN/Creatinine ratio 11 (L) 12 - 20      GFR est AA 31 (L) >60 ml/min/1.73m2    GFR est non-AA 25 (L) >60 ml/min/1.73m2    Calcium 7.7 (L) 8.5 - 10.1 MG/DL   CBC WITH AUTOMATED DIFF    Collection Time: 03/08/21  1:38 AM   Result Value Ref Range    WBC 8.1 4.1 - 11.1 K/uL    RBC 2.42 (L) 4.10 - 5.70 M/uL    HGB 7.5 (L) 12.1 - 17.0 g/dL    HCT 23.5 (L) 36.6 - 50.3 %    MCV 97.1 80.0 - 99.0 FL    MCH 31.0 26.0 - 34.0 PG    MCHC 31.9 30.0 - 36.5 g/dL    RDW 13.0 11.5 - 14.5 %    PLATELET 858 (L) 469 - 400 K/uL    MPV 10.2 8.9 - 12.9 FL    NRBC 0.0 0  WBC    ABSOLUTE NRBC 0.00 0.00 - 0.01 K/uL    NEUTROPHILS 76 (H) 32 - 75 %    LYMPHOCYTES 13 12 - 49 %    MONOCYTES 9 5 - 13 %    EOSINOPHILS 1 0 - 7 %    BASOPHILS 1 0 - 1 %    IMMATURE GRANULOCYTES 1 (H) 0.0 - 0.5 %    ABS. NEUTROPHILS 6.2 1.8 - 8.0 K/UL    ABS. LYMPHOCYTES 1.1 0.8 - 3.5 K/UL    ABS. MONOCYTES 0.8 0.0 - 1.0 K/UL    ABS. EOSINOPHILS 0.1 0.0 - 0.4 K/UL    ABS. BASOPHILS 0.0 0.0 - 0.1 K/UL    ABS. IMM. GRANS. 0.0 0.00 - 0.04 K/UL    DF AUTOMATED         Telemetry: SR    Assessment:     Active Problems:    Hyperkalemia (3/5/2021)      [facility] (3/5/2021)      Seizure ([facility] (3/5/2021)      LUCY (acute kidney injury) ([facility] (3/5/2021)        Plan:     1. Rt. Hip surgery: post op stable. D/c leximyoview. Monitor. 2. Cr stable.    3. BP controlled.

## 2021-03-09 ENCOUNTER — APPOINTMENT (OUTPATIENT)
Dept: GENERAL RADIOLOGY | Age: 86
DRG: 481 | End: 2021-03-09
Attending: HOSPITALIST
Payer: MEDICARE

## 2021-03-09 LAB
ANION GAP SERPL CALC-SCNC: 8 MMOL/L (ref 5–15)
BASOPHILS # BLD: 0 K/UL (ref 0–0.1)
BASOPHILS NFR BLD: 0 % (ref 0–1)
BUN SERPL-MCNC: 31 MG/DL (ref 6–20)
BUN/CREAT SERPL: 12 (ref 12–20)
CALCIUM SERPL-MCNC: 7.9 MG/DL (ref 8.5–10.1)
CHLORIDE SERPL-SCNC: 106 MMOL/L (ref 97–108)
CO2 SERPL-SCNC: 22 MMOL/L (ref 21–32)
COVID-19 RAPID TEST, COVR: NOT DETECTED
CREAT SERPL-MCNC: 2.69 MG/DL (ref 0.7–1.3)
DIFFERENTIAL METHOD BLD: ABNORMAL
EOSINOPHIL # BLD: 0.1 K/UL (ref 0–0.4)
EOSINOPHIL NFR BLD: 1 % (ref 0–7)
ERYTHROCYTE [DISTWIDTH] IN BLOOD BY AUTOMATED COUNT: 12.9 % (ref 11.5–14.5)
GLUCOSE BLD STRIP.AUTO-MCNC: 194 MG/DL (ref 65–100)
GLUCOSE BLD STRIP.AUTO-MCNC: 199 MG/DL (ref 65–100)
GLUCOSE BLD STRIP.AUTO-MCNC: 213 MG/DL (ref 65–100)
GLUCOSE BLD STRIP.AUTO-MCNC: 273 MG/DL (ref 65–100)
GLUCOSE SERPL-MCNC: 157 MG/DL (ref 65–100)
HCT VFR BLD AUTO: 19.2 % (ref 36.6–50.3)
HCT VFR BLD AUTO: 21.7 % (ref 36.6–50.3)
HGB BLD-MCNC: 6.2 G/DL (ref 12.1–17)
HGB BLD-MCNC: 7.4 G/DL (ref 12.1–17)
HISTORY CHECKED?,CKHIST: NORMAL
IMM GRANULOCYTES # BLD AUTO: 0.1 K/UL (ref 0–0.04)
IMM GRANULOCYTES NFR BLD AUTO: 1 % (ref 0–0.5)
LYMPHOCYTES # BLD: 1.2 K/UL (ref 0.8–3.5)
LYMPHOCYTES NFR BLD: 18 % (ref 12–49)
MCH RBC QN AUTO: 30.7 PG (ref 26–34)
MCHC RBC AUTO-ENTMCNC: 32.3 G/DL (ref 30–36.5)
MCV RBC AUTO: 95 FL (ref 80–99)
MONOCYTES # BLD: 0.7 K/UL (ref 0–1)
MONOCYTES NFR BLD: 10 % (ref 5–13)
NEUTS SEG # BLD: 4.8 K/UL (ref 1.8–8)
NEUTS SEG NFR BLD: 70 % (ref 32–75)
NRBC # BLD: 0 K/UL (ref 0–0.01)
NRBC BLD-RTO: 0 PER 100 WBC
PLATELET # BLD AUTO: 109 K/UL (ref 150–400)
PMV BLD AUTO: 10 FL (ref 8.9–12.9)
POTASSIUM SERPL-SCNC: 4.3 MMOL/L (ref 3.5–5.1)
RBC # BLD AUTO: 2.02 M/UL (ref 4.1–5.7)
RBC MORPH BLD: ABNORMAL
SERVICE CMNT-IMP: ABNORMAL
SODIUM SERPL-SCNC: 136 MMOL/L (ref 136–145)
SOURCE, COVRS: NORMAL
WBC # BLD AUTO: 6.9 K/UL (ref 4.1–11.1)

## 2021-03-09 PROCEDURE — 36430 TRANSFUSION BLD/BLD COMPNT: CPT

## 2021-03-09 PROCEDURE — 74011000250 HC RX REV CODE- 250: Performed by: ORTHOPAEDIC SURGERY

## 2021-03-09 PROCEDURE — 2709999900 HC NON-CHARGEABLE SUPPLY

## 2021-03-09 PROCEDURE — 86923 COMPATIBILITY TEST ELECTRIC: CPT

## 2021-03-09 PROCEDURE — 85025 COMPLETE CBC W/AUTO DIFF WBC: CPT

## 2021-03-09 PROCEDURE — 94760 N-INVAS EAR/PLS OXIMETRY 1: CPT

## 2021-03-09 PROCEDURE — 80048 BASIC METABOLIC PNL TOTAL CA: CPT

## 2021-03-09 PROCEDURE — 74011250636 HC RX REV CODE- 250/636: Performed by: ORTHOPAEDIC SURGERY

## 2021-03-09 PROCEDURE — 74011636637 HC RX REV CODE- 636/637: Performed by: ORTHOPAEDIC SURGERY

## 2021-03-09 PROCEDURE — 99233 SBSQ HOSP IP/OBS HIGH 50: CPT | Performed by: INTERNAL MEDICINE

## 2021-03-09 PROCEDURE — 85018 HEMOGLOBIN: CPT

## 2021-03-09 PROCEDURE — 74011250637 HC RX REV CODE- 250/637: Performed by: ORTHOPAEDIC SURGERY

## 2021-03-09 PROCEDURE — 86901 BLOOD TYPING SEROLOGIC RH(D): CPT

## 2021-03-09 PROCEDURE — P9016 RBC LEUKOCYTES REDUCED: HCPCS

## 2021-03-09 PROCEDURE — 71045 X-RAY EXAM CHEST 1 VIEW: CPT

## 2021-03-09 PROCEDURE — 36415 COLL VENOUS BLD VENIPUNCTURE: CPT

## 2021-03-09 PROCEDURE — 87635 SARS-COV-2 COVID-19 AMP PRB: CPT

## 2021-03-09 PROCEDURE — 74011636637 HC RX REV CODE- 636/637: Performed by: HOSPITALIST

## 2021-03-09 PROCEDURE — 82962 GLUCOSE BLOOD TEST: CPT

## 2021-03-09 PROCEDURE — 65270000029 HC RM PRIVATE

## 2021-03-09 RX ORDER — INSULIN GLARGINE 100 [IU]/ML
5 INJECTION, SOLUTION SUBCUTANEOUS
Status: DISCONTINUED | OUTPATIENT
Start: 2021-03-09 | End: 2021-03-12 | Stop reason: HOSPADM

## 2021-03-09 RX ORDER — SODIUM CHLORIDE 9 MG/ML
250 INJECTION, SOLUTION INTRAVENOUS AS NEEDED
Status: DISCONTINUED | OUTPATIENT
Start: 2021-03-09 | End: 2021-03-12 | Stop reason: HOSPADM

## 2021-03-09 RX ORDER — ALOGLIPTIN 6.25 MG/1
6.25 TABLET, FILM COATED ORAL DAILY
Status: DISCONTINUED | OUTPATIENT
Start: 2021-03-10 | End: 2021-03-12 | Stop reason: HOSPADM

## 2021-03-09 RX ADMIN — Medication 1 TABLET: at 08:25

## 2021-03-09 RX ADMIN — INSULIN LISPRO 2 UNITS: 100 INJECTION, SOLUTION INTRAVENOUS; SUBCUTANEOUS at 17:42

## 2021-03-09 RX ADMIN — Medication 1 TABLET: at 12:30

## 2021-03-09 RX ADMIN — ACETAMINOPHEN 650 MG: 325 TABLET ORAL at 06:10

## 2021-03-09 RX ADMIN — Medication 10 ML: at 22:11

## 2021-03-09 RX ADMIN — OXYCODONE 2.5 MG: 5 TABLET ORAL at 00:55

## 2021-03-09 RX ADMIN — OXYCODONE 2.5 MG: 5 TABLET ORAL at 21:00

## 2021-03-09 RX ADMIN — HEPARIN SODIUM 5000 UNITS: 5000 INJECTION INTRAVENOUS; SUBCUTANEOUS at 00:57

## 2021-03-09 RX ADMIN — Medication 1 AMPULE: at 22:10

## 2021-03-09 RX ADMIN — LATANOPROST 1 DROP: 50 SOLUTION OPHTHALMIC at 01:10

## 2021-03-09 RX ADMIN — INSULIN LISPRO 3 UNITS: 100 INJECTION, SOLUTION INTRAVENOUS; SUBCUTANEOUS at 08:24

## 2021-03-09 RX ADMIN — INSULIN LISPRO 5 UNITS: 100 INJECTION, SOLUTION INTRAVENOUS; SUBCUTANEOUS at 12:29

## 2021-03-09 RX ADMIN — PREGABALIN 50 MG: 25 CAPSULE ORAL at 08:25

## 2021-03-09 RX ADMIN — ACETAMINOPHEN 650 MG: 325 TABLET ORAL at 12:30

## 2021-03-09 RX ADMIN — ACETAMINOPHEN 650 MG: 325 TABLET ORAL at 00:55

## 2021-03-09 RX ADMIN — ACETAMINOPHEN 650 MG: 325 TABLET ORAL at 17:43

## 2021-03-09 RX ADMIN — Medication 10 ML: at 06:11

## 2021-03-09 RX ADMIN — HEPARIN SODIUM 5000 UNITS: 5000 INJECTION INTRAVENOUS; SUBCUTANEOUS at 08:25

## 2021-03-09 RX ADMIN — ALLOPURINOL 100 MG: 100 TABLET ORAL at 08:25

## 2021-03-09 RX ADMIN — Medication 1 AMPULE: at 08:23

## 2021-03-09 RX ADMIN — HEPARIN SODIUM 5000 UNITS: 5000 INJECTION INTRAVENOUS; SUBCUTANEOUS at 17:43

## 2021-03-09 RX ADMIN — Medication 10 ML: at 14:25

## 2021-03-09 RX ADMIN — INSULIN GLARGINE 5 UNITS: 100 INJECTION, SOLUTION SUBCUTANEOUS at 22:10

## 2021-03-09 RX ADMIN — Medication 1 TABLET: at 17:43

## 2021-03-09 RX ADMIN — CYANOCOBALAMIN TAB 500 MCG 1000 MCG: 500 TAB at 08:25

## 2021-03-09 NOTE — PROGRESS NOTES
Attempted to see pt this am and will defer tx today 2/2 to low HgB (6.2). Will continue to follow and tx once pt receives a unit of blood and when cleared by nursing.

## 2021-03-09 NOTE — PROGRESS NOTES
ORTHO - Progress Note  Post Op day: 2 Days [address]     [id number]  male    80 y.o.    10/6/1932    Admit date:3/5/2021  Date of Surgery:3/7/2021   Procedures:Procedure(s):RIGHT FEMUR INSERTION INTRA MEDULLARY NAIL (URGENT)  Surgeon:Surgeon(s) and Role:   Keyonna Ryan, DO - Primary        SUBJECTIVE:     Uyen Washington is a 80 y.o. male resting in the bed. Patient has complaints of appropriate post-op pain-- using APAP. Denies F/C, nausea, vomiting, dizziness, lightheadedness, chest pain, or shortness of breath. Pt's son at bedside. OBJECTIVE:       Physical Exam:  General: alert, cooperative, no distress. Gastrointestinal:  non-distended . Cardiovascular: equal pulses in the  lower extremities,  Brisk cap refill in all distal extremities   Genitourinary: Alvarez   Respiratory: No respiratory distress   Neurological:Neurovascular exam within normal limits. Senstion intact: LE bilat. Motor: + DF/PF/EHL. Musculoskeletal: Thelma's sign negative in bilateral lower extremities. Calves soft, supple, non-tender upon palpation or with passive stretch. Dressing/Wound:  Trace bloody drainage and intact. No significant erythema or swelling.     Vital Signs:         Patient Vitals for the past 8 hrs:   BP Temp Pulse Resp SpO2   21 0804 (!) 152/91 98.6 °F (37 °C) 98 18 91 %                                          Temp (24hrs), Av.2 °F (37.3 °C), Min:98.6 °F (37 °C), Max:100.5 °F (38.1 °C)    Date 21 0700 - 03/10/21 0659   Shift 2273-2176 5821-2212 0252-1935 24 Hour Total   INTAKE   Shift Total(mL/kg)       OUTPUT   Urine(mL/kg/hr) 600   600   Shift Total(mL/kg) 600(7.9)   600(7.9)   Weight (kg) 76.4 76.4 76.4 76.4     Labs:        Recent Labs     21  0326   HCT 19.2*   HGB 6.2*     PT/OT:              ASSESSMENT / PLAN:   Active Problems:    Hyperkalemia (3/5/2021)      Hip fracture ([address] (3/5/2021)      Seizure ([facility] (3/5/2021)      LUCY (acute kidney injury) ([facility] (3/5/2021)       -  Anemia- multifactorial due to fx and operative blood loss, x-fuse one unit today(3/9)  -  Pain meds regimen adjusted  -  Alvarez kept to monitor output - DC after pt completes his PRBCs  -  Continue PT/OT WBAT  -  DVT prophylaxis- SCD w/ Heparin  -  DC planning - SNF likely     Signed By: Golden Hutchison PA-C

## 2021-03-09 NOTE — PROGRESS NOTES
Nephrology Progress Note  Johnnie Milian     [email or web address]  Phone - [phone]   Patient: Floyd Stone    YOB: 1932        Date- 3/9/2021   Admit Date: 3/5/2021  CC: Follow up for  LUCY on CKD          IMPRESSION & PLAN:    LUCY on CKD    DM2   HTN   Hip fracture    Anemia    PLAN-   Suspect LUCY on CKD.  Cr has been stable along with lytes   CKD sec to HTN and DM2, suspect  B/L Cr at 2.4 range.  Will continue to monitor.  Continue Alvarez for now.  Will need PRBC today.  Can D/C Alvarez today. Subjective: Interval History:   -  Seen this am. Feels better    Objective:   Vitals:    03/08/21 1645 03/08/21 2013 03/09/21 0004 03/09/21 0804   BP: 121/63 (!) 150/86 114/69 (!) 152/91   Pulse: 98 99 98 98   Resp: 18 18 18 18   Temp: 99.1 °F (37.3 °C) 99.2 °F (37.3 °C) 98.7 °F (37.1 °C) 98.6 °F (37 °C)   SpO2: 98% 96% 98% 91%   Weight:       Height:          03/08 0701 - 03/09 0700  In: 200 [P.O.:200]  Out: 1525 [Urine:1525]  Last 3 Recorded Weights in this Encounter    03/06/21 1529 03/07/21 0312 03/08/21 0307   Weight: 76.7 kg (169 lb 1.5 oz) 74.9 kg (165 lb 3.2 oz) 76.4 kg (168 lb 7 oz)      Physical exam:   GEN: NAD  NECK- Supple, no mass  RESP: No wheezing, Clear b/l  CVS: S1,S2  RRR  NEURO: Normal speech, Non focal  EXT: No Edema       Chart reviewed. Pertinent Notes reviewed. Data Review :  Recent Labs     03/09/21  0326 03/08/21  0138 03/07/21  0317    136 134*  136   K 4.3 4.2 4.0  4.1    106 105  107   CO2 22 23 24  23   BUN 31* 28* 25*  25*   CREA 2.69* 2.44* 2.40*  2.40*   * 115* 99  99   CA 7.9* 7.7* 8.0*  8.0*   PHOS  --   --  3.6     Recent Labs     03/09/21  0326 03/08/21  0138   WBC 6.9 8.1   HGB 6.2* 7.5*   HCT 19.2* 23.5*   * 120*     No results for input(s): FE, TIBC, PSAT, FERR in the last 72 hours.    Medication list  reviewed  Current Facility-Administered Medications   Medication Dose Route Frequency    0.9% sodium chloride infusion 250 mL  250 mL IntraVENous PRN    artificial tears (dextran-hypromellose-glycerin) (GENTEAL) ophthalmic solution 1 Drop  1 Drop Both Eyes QID PRN    ceFAZolin (ANCEF) 2 g in sterile water (preservative free) 20 mL IV syringe  2 g IntraVENous ONCE PRN    alcohol 62% (NOZIN) nasal  1 Ampule  1 Ampule Topical Q12H    sodium chloride (NS) flush 5-40 mL  5-40 mL IntraVENous Q8H    sodium chloride (NS) flush 5-40 mL  5-40 mL IntraVENous PRN    acetaminophen (TYLENOL) tablet 650 mg  650 mg Oral Q6H    naloxone (NARCAN) injection 0.4 mg  0.4 mg IntraVENous PRN    ondansetron (ZOFRAN ODT) tablet 4 mg  4 mg Oral Q4H PRN    calcium-vitamin D (OS-EDUARDO +D3) 500 mg-200 unit per tablet 1 Tab  1 Tab Oral TID WITH MEALS    senna-docusate (PERICOLACE) 8.6-50 mg per tablet 1 Tab  1 Tab Oral BID    polyethylene glycol (MIRALAX) packet 17 g  17 g Oral DAILY    bisacodyL (DULCOLAX) suppository 10 mg  10 mg Rectal DAILY PRN    heparin (porcine) injection 5,000 Units  5,000 Units SubCUTAneous Q8H    oxyCODONE IR (ROXICODONE) tablet 2.5 mg  2.5 mg Oral Q4H PRN    allopurinoL (ZYLOPRIM) tablet 100 mg  100 mg Oral DAILY    pregabalin (LYRICA) capsule 50 mg  50 mg Oral DAILY    traZODone (DESYREL) tablet 50 mg  50 mg Oral QHS PRN    latanoprost (XALATAN) 0.005 % ophthalmic solution 1 Drop  1 Drop Both Eyes QHS    cyanocobalamin (VITAMIN B12) tablet 1,000 mcg  1,000 mcg Oral DAILY    acetaminophen (TYLENOL) tablet 650 mg  650 mg Oral Q6H PRN    Or    acetaminophen (TYLENOL) suppository 650 mg  650 mg Rectal Q6H PRN    polyethylene glycol (MIRALAX) packet 17 g  17 g Oral DAILY PRN    promethazine (PHENERGAN) tablet 12.5 mg  12.5 mg Oral Q6H PRN    insulin lispro (HUMALOG) injection   SubCUTAneous AC&HS    glucose chewable tablet 16 g  4 Tab Oral PRN    dextrose (D50W) injection syrg 12.5-25 g  12.5-25 g IntraVENous PRN    glucagon (GLUCAGEN) injection 1 mg  1 mg IntraMUSCular PRN          Jericho Thorne, [address] [facility]  [facility], [address]  [place], [address]  Phone - [phone]               Fax - [phone]

## 2021-03-09 NOTE — PROGRESS NOTES
Occupational Therapy  Medical record reviewed. Noted that pt's HGB is 6.2 and he will receive PRBCs. Will defer and continue to follow as appropriate.

## 2021-03-09 NOTE — PROGRESS NOTES
Transition of Care Plan:    Disposition: SNF  Follow up appointments: PCP, Ortho   DME needed: N/A - SNF will provide if needed  Transportation at Discharge: Possible BLS  [place] or means to access home:  N/A - pt d/c to SNF      IM Medicare letter: To be given prior to d/c  Caregiver Contact: DaughterDarby ([phone]). Discharge Caregiver contacted prior to discharge? CM will contact pt's cg 24 hours prior to d/c          Update 1:59 PM  CM spoke with pt's grandson and his preference is [facility] for SNF placement. CM sent referral via [facility]. CM informed Attending that pt will need COVID test before d/c.    CM met pt's grandson Shelli Valdez ([phone]) at bedside to discuss d/c planning. PT/OT is recommending SNF once pt is ready for d/c. CM provided pt's grandson with a list of Skilled Nursing Facilities to chose from. CM will follow back up with pt's grandson this afternoon to get facility preferences.      Samira Das, MSW  Care Manager [facility]  [phone]

## 2021-03-09 NOTE — PROGRESS NOTES
3/9/2021 8:41 AM    Admit Date: 3/5/2021    Admit Diagnosis: Hip fracture ([facility]; [facility] [R56.9]; LUCY (acute kidney injury) ([facility] [N17.9]; Hyperkalemia [E87.5]    Subjective:     Haze Pour   denies chest pain, chest pressure/discomfort, dyspnea, palpitations, irregular heart beats, near-syncope, syncope, fatigue, orthopnea, paroxysmal nocturnal dyspnea, exertional chest pressure/discomfort.     Visit Vitals  BP (!) 152/91   Pulse 98   Temp 98.6 °F (37 °C)   Resp 18   Ht 5' 6\" (1.676 m)   Wt 168 lb 7 oz (76.4 kg)   SpO2 91%   BMI 27.19 kg/m²     Current Facility-Administered Medications   Medication Dose Route Frequency    artificial tears (dextran-hypromellose-glycerin) (GENTEAL) ophthalmic solution 1 Drop  1 Drop Both Eyes QID PRN    ceFAZolin (ANCEF) 2 g in sterile water (preservative free) 20 mL IV syringe  2 g IntraVENous ONCE PRN    alcohol 62% (NOZIN) nasal  1 Ampule  1 Ampule Topical Q12H    sodium chloride (NS) flush 5-40 mL  5-40 mL IntraVENous Q8H    sodium chloride (NS) flush 5-40 mL  5-40 mL IntraVENous PRN    acetaminophen (TYLENOL) tablet 650 mg  650 mg Oral Q6H    naloxone (NARCAN) injection 0.4 mg  0.4 mg IntraVENous PRN    ondansetron (ZOFRAN ODT) tablet 4 mg  4 mg Oral Q4H PRN    calcium-vitamin D (OS-EDUARDO +D3) 500 mg-200 unit per tablet 1 Tab  1 Tab Oral TID WITH MEALS    senna-docusate (PERICOLACE) 8.6-50 mg per tablet 1 Tab  1 Tab Oral BID    polyethylene glycol (MIRALAX) packet 17 g  17 g Oral DAILY    bisacodyL (DULCOLAX) suppository 10 mg  10 mg Rectal DAILY PRN    heparin (porcine) injection 5,000 Units  5,000 Units SubCUTAneous Q8H    oxyCODONE IR (ROXICODONE) tablet 2.5 mg  2.5 mg Oral Q4H PRN    allopurinoL (ZYLOPRIM) tablet 100 mg  100 mg Oral DAILY    pregabalin (LYRICA) capsule 50 mg  50 mg Oral DAILY    traZODone (DESYREL) tablet 50 mg  50 mg Oral QHS PRN    latanoprost (XALATAN) 0.005 % ophthalmic solution 1 Drop  1 Drop Both Eyes QHS    cyanocobalamin (VITAMIN B12) tablet 1,000 mcg  1,000 mcg Oral DAILY    acetaminophen (TYLENOL) tablet 650 mg  650 mg Oral Q6H PRN    Or    acetaminophen (TYLENOL) suppository 650 mg  650 mg Rectal Q6H PRN    polyethylene glycol (MIRALAX) packet 17 g  17 g Oral DAILY PRN    promethazine (PHENERGAN) tablet 12.5 mg  12.5 mg Oral Q6H PRN    insulin lispro (HUMALOG) injection   SubCUTAneous AC&HS    glucose chewable tablet 16 g  4 Tab Oral PRN    dextrose (D50W) injection syrg 12.5-25 g  12.5-25 g IntraVENous PRN    glucagon (GLUCAGEN) injection 1 mg  1 mg IntraMUSCular PRN         Objective:      Visit Vitals  BP (!) 152/91   Pulse 98   Temp 98.6 °F (37 °C)   Resp 18   Ht 5' 6\" (1.676 m)   Wt 168 lb 7 oz (76.4 kg)   SpO2 91%   BMI 27.19 kg/m²       Physical Exam:  Resting comfortably. No resp distress.    Extremities: s/p right hip surgery  Heart: regular rate and rhythm, S1, S2 normal, no murmur, click, rub or gallop  Lungs: clear to auscultation bilaterally  Neurologic: Grossly normal    Data Review:   Labs:    Recent Results (from the past 24 hour(s))   GLUCOSE, POC    Collection Time: 03/08/21 11:29 AM   Result Value Ref Range    Glucose (POC) 168 (H) 65 - 100 mg/dL    Performed by [address] [place] [id number], POC    Collection Time: 03/08/21  4:50 PM   Result Value Ref Range    Glucose (POC) 302 (H) 65 - 100 mg/dL    Performed by Basil Ohs    GLUCOSE, POC    Collection Time: 03/08/21  4:53 PM   Result Value Ref Range    Glucose (POC) 252 (H) 65 - 100 mg/dL    Performed by Basil Ohs    GLUCOSE, POC    Collection Time: 03/08/21  9:13 PM   Result Value Ref Range    Glucose (POC) 206 (H) 65 - 100 mg/dL    Performed by [address] [place] [address], BASIC    Collection Time: 03/09/21  3:26 AM   Result Value Ref Range    Sodium 136 136 - 145 mmol/L    Potassium 4.3 3.5 - 5.1 mmol/L    Chloride 106 97 - 108 mmol/L    CO2 22 21 - 32 mmol/L    Anion gap 8 5 - 15 mmol/L    Glucose 157 (H) 65 - 100 mg/dL    BUN 31 (H) 6 - 20 MG/DL    Creatinine 2.69 (H) 0.70 - 1.30 MG/DL    BUN/Creatinine ratio 12 12 - 20      GFR est AA 27 (L) >60 ml/min/1.73m2    GFR est non-AA 23 (L) >60 ml/min/1.73m2    Calcium 7.9 (L) 8.5 - 10.1 MG/DL   CBC WITH AUTOMATED DIFF    Collection Time: 03/09/21  3:26 AM   Result Value Ref Range    WBC 6.9 4.1 - 11.1 K/uL    RBC 2.02 (L) 4.10 - 5.70 M/uL    HGB 6.2 (L) 12.1 - 17.0 g/dL    HCT 19.2 (L) 36.6 - 50.3 %    MCV 95.0 80.0 - 99.0 FL    MCH 30.7 26.0 - 34.0 PG    MCHC 32.3 30.0 - 36.5 g/dL    RDW 12.9 11.5 - 14.5 %    PLATELET 867 (L) 200 - 400 K/uL    MPV 10.0 8.9 - 12.9 FL    NRBC 0.0 0  WBC    ABSOLUTE NRBC 0.00 0.00 - 0.01 K/uL    NEUTROPHILS 70 32 - 75 %    LYMPHOCYTES 18 12 - 49 %    MONOCYTES 10 5 - 13 %    EOSINOPHILS 1 0 - 7 %    BASOPHILS 0 0 - 1 %    IMMATURE GRANULOCYTES 1 (H) 0.0 - 0.5 %    ABS. NEUTROPHILS 4.8 1.8 - 8.0 K/UL    ABS. LYMPHOCYTES 1.2 0.8 - 3.5 K/UL    ABS. MONOCYTES 0.7 0.0 - 1.0 K/UL    ABS. EOSINOPHILS 0.1 0.0 - 0.4 K/UL    ABS. BASOPHILS 0.0 0.0 - 0.1 K/UL    ABS. IMM. GRANS. 0.1 (H) 0.00 - 0.04 K/UL    DF SMEAR SCANNED      RBC COMMENTS NORMOCYTIC, NORMOCHROMIC     GLUCOSE, POC    Collection Time: 03/09/21  6:41 AM   Result Value Ref Range    Glucose (POC) 213 (H) 65 - 100 mg/dL    Performed by Yarelis Lewis        Telemetry: SR      Assessment:     Active Problems:    Hyperkalemia (3/5/2021)      Hip fracture ([address] (3/5/2021)      [facility] ([facility])      LUCY (acute kidney injury) ([facility] (3/5/2021)        Plan:     1. Rt. Hip surgery: post op stable. 2. Anemia: plans for BT noted.    3. Follow Cr and BP.

## 2021-03-09 NOTE — PROGRESS NOTES
Hospitalist Progress Note    NAME: Alison Garcia   :  10/6/1932   MRN:  [id number]       Assessment / Plan:  Right Hip fracture, POA due to Mechanical fall at home  ORIF 3/7 Dr Douglass Embs:  K[facility] Champion hip IMN for intertrochanteric hip fractur  X-ray hip: comminuted intertrochanteric fracture of the right hip  CT head shows no acute abnormality. DVT prophylaxis: TEDS &/or SCDs with heparin  Pain management: controlled with current regiment   PT: WBAT, SNF   Hb: baseline 11, down to 6.2 today , 1 prbc   Alvarez: for I&O per nephrology request   BM: on bowel regiment  - preop clearance by cardiology:   Pre op evaluation: intermediate risk surgery. Baseline poor functional status. Echo noted. Recommend leximyoview prior to surgery but due to surgery was done on weekend, proceeded wo it. .   Echo noted: LVEF 42-11%, normal systolic function    Acute blood loss anemia  Expected s/p ortho surgery   Hb down to 6.2, 1 prbs today  D/w grandson at bedside risk and benefits of transfusion, consent signed   Addendum: low grade fever 100.8, check urine and cxray ; no AB unless source is clear or deteriorating     Hyperkalemia resolved   LUCY on CKD   -Hyperkalemia resolved with Kayexalate and [facility] nephrology input:   Suspect LUCY on CKD. Cr has been stable along with lytes  CKD sec to HTN and DM2, suspect  B/L Cr at 2.4 range. Will continue to monitor,D/C IVF today  Continue Alvarez for now.  -Avoid NSAIDs    Acute seizure episode  -Secondary to medication usefentanyl.  -Narcan was given in the ER along with diazepam.  Patient blood pressure dropped and the fluids was given, currently vitals are stable.  -no further episodes since ER  EEG: done 3/8   CT head negative  -DC keppra  -neuro consult appreciated: Most likely dystonic reaction to the fentanyl. Dc keppra      Diabetes type 2  Neuropathy  -BS 200s   -Hold home meds  Cont Lyrica for neuropathy   On Januvia only per med rec at home.  Will re start   Dose of lantus at bedtime today   Check hba1c   -SSI with POC's     Gout: cont allopurinol   Insomnia: trazodone at home prn       Code Status: Full code  Surrogate Decision Maker: Bello Foot- Nephaddy  DVT Prophylaxis:      Baseline: Ambulatory with assistancewalker at home  Disposition: SNF      Subjective:     Chief Complaint / Reason for Physician Visit: fu hip fracture  Pleasantly confused  Per grandson at bedside, MS at baseline   Denies pain     Discussed with RN events overnight. Review of Systems:  Symptom Y/N Comments  Symptom Y/N Comments   Fever/Chills n   Chest Pain n    Poor Appetite    Edema     Cough    Abdominal Pain n    Sputum    Joint Pain     SOB/BARRON n   Pruritis/Rash     Nausea/vomit    Tolerating PT/OT     Diarrhea    Tolerating Diet     Constipation    Other       Could NOT obtain due to:      Objective:     VITALS:   Last 24hrs VS reviewed since prior progress note. Most recent are:  Patient Vitals for the past 24 hrs:   Temp Pulse Resp BP SpO2   03/09/21 1152 98.3 °F (36.8 °C) 88 16 (!) 142/85 100 %   03/09/21 1138 99 °F (37.2 °C) 93 16 139/85 99 %   03/09/21 1123 99.5 °F (37.5 °C) 93 18 139/86 94 %   03/09/21 1108 99 °F (37.2 °C) 95 18 (!) 141/82 97 %   03/09/21 0804 98.6 °F (37 °C) 98 18 (!) 152/91 91 %   03/09/21 0004 98.7 °F (37.1 °C) 98 18 114/69 98 %   03/08/21 2013 99.2 °F (37.3 °C) 99 18 (!) 150/86 96 %   03/08/21 1645 99.1 °F (37.3 °C) 98 18 121/63 98 %       Intake/Output Summary (Last 24 hours) at 3/9/2021 1211  Last data filed at 3/9/2021 1049  Gross per 24 hour   Intake 200 ml   Output 2125 ml   Net -1925 ml        I had a face to face encounter and independently examined this patient on 3/9/2021, as outlined below:  PHYSICAL EXAM:  General: WD, WN. Alert, cooperative, no acute distress    EENT:  EOMI. Anicteric sclerae. MMM  Resp:  CTA bilaterally, no wheezing or rales. No accessory muscle use  CV:  Regular  rhythm,  No edema  GI:  Soft, Non distended, Non tender.   +Bowel sounds  Neurologic:  Alert and oriented X 1-2 ( rehab), normal speech,   Psych:   Not anxious nor agitated  Skin:  No rashes. No jaundice    Reviewed most current lab test results and cultures  YES  Reviewed most current radiology test results   YES  Review and summation of old records today    NO  Reviewed patient's current orders and MAR    YES  PMH/SH reviewed - no change compared to H&P  ________________________________________________________________________  Care Plan discussed with:    Comments   Patient x    Family  x Grandson bedside    RN x    Care Manager x    Consultant                       x Multidiciplinary team rounds were held today with , nursing, pharmacist and clinical coordinator. Patient's plan of care was discussed; medications were reviewed and discharge planning was addressed. ________________________________________________________________________  Total NON critical care TIME:  35  Minutes    Total CRITICAL CARE TIME Spent:   Minutes non procedure based      Comments   >50% of visit spent in counseling and coordination of care x    ________________________________________________________________________  Bri Diamond MD     Procedures: see electronic medical records for all procedures/Xrays and details which were not copied into this note but were reviewed prior to creation of Plan. LABS:  I reviewed today's most current labs and imaging studies.   Pertinent labs include:  Recent Labs     03/09/21  0326 03/08/21  0138   WBC 6.9 8.1   HGB 6.2* 7.5*   HCT 19.2* 23.5*   * 120*     Recent Labs     03/09/21  0326 03/08/21  0138 03/07/21  0317    136 134*  136   K 4.3 4.2 4.0  4.1    106 105  107   CO2 22 23 24  23   * 115* 99  99   BUN 31* 28* 25*  25*   CREA 2.69* 2.44* 2.40*  2.40*   CA 7.9* 7.7* 8.0*  8.0*   PHOS  --   --  3.6   ALB  --   --  3.0*       Signed: Bri Diamond MD

## 2021-03-10 LAB
ABO + RH BLD: NORMAL
ANION GAP SERPL CALC-SCNC: 6 MMOL/L (ref 5–15)
APPEARANCE UR: CLEAR
BACTERIA URNS QL MICRO: NEGATIVE /HPF
BILIRUB UR QL: NEGATIVE
BLD PROD TYP BPU: NORMAL
BLOOD GROUP ANTIBODIES SERPL: NORMAL
BPU ID: NORMAL
BUN SERPL-MCNC: 31 MG/DL (ref 6–20)
BUN/CREAT SERPL: 13 (ref 12–20)
CALCIUM SERPL-MCNC: 8.7 MG/DL (ref 8.5–10.1)
CHLORIDE SERPL-SCNC: 103 MMOL/L (ref 97–108)
CO2 SERPL-SCNC: 25 MMOL/L (ref 21–32)
COLOR UR: ABNORMAL
CREAT SERPL-MCNC: 2.34 MG/DL (ref 0.7–1.3)
CROSSMATCH RESULT,%XM: NORMAL
EPITH CASTS URNS QL MICRO: ABNORMAL /LPF
ERYTHROCYTE [DISTWIDTH] IN BLOOD BY AUTOMATED COUNT: 13.9 % (ref 11.5–14.5)
EST. AVERAGE GLUCOSE BLD GHB EST-MCNC: 146 MG/DL
GLUCOSE BLD STRIP.AUTO-MCNC: 214 MG/DL (ref 65–100)
GLUCOSE BLD STRIP.AUTO-MCNC: 231 MG/DL (ref 65–100)
GLUCOSE BLD STRIP.AUTO-MCNC: 236 MG/DL (ref 65–100)
GLUCOSE BLD STRIP.AUTO-MCNC: 242 MG/DL (ref 65–100)
GLUCOSE SERPL-MCNC: 172 MG/DL (ref 65–100)
GLUCOSE UR STRIP.AUTO-MCNC: 250 MG/DL
HBA1C MFR BLD: 6.7 % (ref 4–5.6)
HCT VFR BLD AUTO: 25.6 % (ref 36.6–50.3)
HGB BLD-MCNC: 8.7 G/DL (ref 12.1–17)
HGB UR QL STRIP: ABNORMAL
HYALINE CASTS URNS QL MICRO: ABNORMAL /LPF (ref 0–5)
KETONES UR QL STRIP.AUTO: NEGATIVE MG/DL
LEUKOCYTE ESTERASE UR QL STRIP.AUTO: NEGATIVE
MCH RBC QN AUTO: 31 PG (ref 26–34)
MCHC RBC AUTO-ENTMCNC: 34 G/DL (ref 30–36.5)
MCV RBC AUTO: 91.1 FL (ref 80–99)
NITRITE UR QL STRIP.AUTO: NEGATIVE
NRBC # BLD: 0 K/UL (ref 0–0.01)
NRBC BLD-RTO: 0 PER 100 WBC
PH UR STRIP: 7 [PH] (ref 5–8)
PLATELET # BLD AUTO: 141 K/UL (ref 150–400)
PMV BLD AUTO: 11.4 FL (ref 8.9–12.9)
POTASSIUM SERPL-SCNC: 4.7 MMOL/L (ref 3.5–5.1)
PROCALCITONIN SERPL-MCNC: 0.27 NG/ML
PROT UR STRIP-MCNC: ABNORMAL MG/DL
RBC # BLD AUTO: 2.81 M/UL (ref 4.1–5.7)
RBC #/AREA URNS HPF: ABNORMAL /HPF (ref 0–5)
SERVICE CMNT-IMP: ABNORMAL
SODIUM SERPL-SCNC: 134 MMOL/L (ref 136–145)
SP GR UR REFRACTOMETRY: 1.01 (ref 1–1.03)
SPECIMEN EXP DATE BLD: NORMAL
STATUS OF UNIT,%ST: NORMAL
UA: UC IF INDICATED,UAUC: ABNORMAL
UNIT DIVISION, %UDIV: 0
UROBILINOGEN UR QL STRIP.AUTO: 1 EU/DL (ref 0.2–1)
WBC # BLD AUTO: 6.6 K/UL (ref 4.1–11.1)
WBC URNS QL MICRO: ABNORMAL /HPF (ref 0–4)

## 2021-03-10 PROCEDURE — 85027 COMPLETE CBC AUTOMATED: CPT

## 2021-03-10 PROCEDURE — 74011636637 HC RX REV CODE- 636/637: Performed by: HOSPITALIST

## 2021-03-10 PROCEDURE — 97530 THERAPEUTIC ACTIVITIES: CPT

## 2021-03-10 PROCEDURE — 94760 N-INVAS EAR/PLS OXIMETRY 1: CPT

## 2021-03-10 PROCEDURE — 80048 BASIC METABOLIC PNL TOTAL CA: CPT

## 2021-03-10 PROCEDURE — 87040 BLOOD CULTURE FOR BACTERIA: CPT

## 2021-03-10 PROCEDURE — 97530 THERAPEUTIC ACTIVITIES: CPT | Performed by: OCCUPATIONAL THERAPIST

## 2021-03-10 PROCEDURE — 74011636637 HC RX REV CODE- 636/637: Performed by: ORTHOPAEDIC SURGERY

## 2021-03-10 PROCEDURE — 81001 URINALYSIS AUTO W/SCOPE: CPT

## 2021-03-10 PROCEDURE — 74011250637 HC RX REV CODE- 250/637: Performed by: ORTHOPAEDIC SURGERY

## 2021-03-10 PROCEDURE — 82962 GLUCOSE BLOOD TEST: CPT

## 2021-03-10 PROCEDURE — 74011250637 HC RX REV CODE- 250/637: Performed by: HOSPITALIST

## 2021-03-10 PROCEDURE — 74011250636 HC RX REV CODE- 250/636: Performed by: ORTHOPAEDIC SURGERY

## 2021-03-10 PROCEDURE — 97116 GAIT TRAINING THERAPY: CPT

## 2021-03-10 PROCEDURE — 97535 SELF CARE MNGMENT TRAINING: CPT | Performed by: OCCUPATIONAL THERAPIST

## 2021-03-10 PROCEDURE — 65270000029 HC RM PRIVATE

## 2021-03-10 PROCEDURE — 83036 HEMOGLOBIN GLYCOSYLATED A1C: CPT

## 2021-03-10 PROCEDURE — 74011250637 HC RX REV CODE- 250/637: Performed by: PHYSICIAN ASSISTANT

## 2021-03-10 PROCEDURE — 99232 SBSQ HOSP IP/OBS MODERATE 35: CPT | Performed by: INTERNAL MEDICINE

## 2021-03-10 PROCEDURE — 97110 THERAPEUTIC EXERCISES: CPT

## 2021-03-10 PROCEDURE — 74011000258 HC RX REV CODE- 258: Performed by: HOSPITALIST

## 2021-03-10 PROCEDURE — 36415 COLL VENOUS BLD VENIPUNCTURE: CPT

## 2021-03-10 PROCEDURE — 84145 PROCALCITONIN (PCT): CPT

## 2021-03-10 PROCEDURE — 74011250636 HC RX REV CODE- 250/636: Performed by: HOSPITALIST

## 2021-03-10 RX ORDER — OXYCODONE HYDROCHLORIDE 5 MG/1
5 TABLET ORAL
Status: DISCONTINUED | OUTPATIENT
Start: 2021-03-10 | End: 2021-03-12 | Stop reason: HOSPADM

## 2021-03-10 RX ORDER — VANCOMYCIN/0.9 % SOD CHLORIDE 1.5G/250ML
1500 PLASTIC BAG, INJECTION (ML) INTRAVENOUS
Status: COMPLETED | OUTPATIENT
Start: 2021-03-10 | End: 2021-03-10

## 2021-03-10 RX ADMIN — Medication 10 ML: at 05:00

## 2021-03-10 RX ADMIN — SODIUM CHLORIDE 1500 MG: 9 INJECTION, SOLUTION INTRAVENOUS at 15:53

## 2021-03-10 RX ADMIN — ACETAMINOPHEN 650 MG: 325 TABLET ORAL at 03:05

## 2021-03-10 RX ADMIN — ALOGLIPTIN 6.25 MG: 6.25 TABLET, FILM COATED ORAL at 08:28

## 2021-03-10 RX ADMIN — HEPARIN SODIUM 5000 UNITS: 5000 INJECTION INTRAVENOUS; SUBCUTANEOUS at 17:04

## 2021-03-10 RX ADMIN — HEPARIN SODIUM 5000 UNITS: 5000 INJECTION INTRAVENOUS; SUBCUTANEOUS at 08:22

## 2021-03-10 RX ADMIN — INSULIN GLARGINE 5 UNITS: 100 INJECTION, SOLUTION SUBCUTANEOUS at 21:39

## 2021-03-10 RX ADMIN — DOCUSATE SODIUM 50MG AND SENNOSIDES 8.6MG 1 TABLET: 8.6; 5 TABLET, FILM COATED ORAL at 08:22

## 2021-03-10 RX ADMIN — ACETAMINOPHEN 650 MG: 325 TABLET ORAL at 17:04

## 2021-03-10 RX ADMIN — ALLOPURINOL 100 MG: 100 TABLET ORAL at 08:22

## 2021-03-10 RX ADMIN — Medication 1 AMPULE: at 20:44

## 2021-03-10 RX ADMIN — Medication 1 TABLET: at 17:04

## 2021-03-10 RX ADMIN — CYANOCOBALAMIN TAB 500 MCG 1000 MCG: 500 TAB at 08:22

## 2021-03-10 RX ADMIN — POLYETHYLENE GLYCOL 3350 17 G: 17 POWDER, FOR SOLUTION ORAL at 08:22

## 2021-03-10 RX ADMIN — Medication 1 AMPULE: at 08:23

## 2021-03-10 RX ADMIN — CEFEPIME HYDROCHLORIDE 1 G: 1 INJECTION, POWDER, FOR SOLUTION INTRAMUSCULAR; INTRAVENOUS at 15:47

## 2021-03-10 RX ADMIN — Medication 1 TABLET: at 11:25

## 2021-03-10 RX ADMIN — OXYCODONE 5 MG: 5 TABLET ORAL at 20:44

## 2021-03-10 RX ADMIN — HEPARIN SODIUM 5000 UNITS: 5000 INJECTION INTRAVENOUS; SUBCUTANEOUS at 03:05

## 2021-03-10 RX ADMIN — INSULIN LISPRO 3 UNITS: 100 INJECTION, SOLUTION INTRAVENOUS; SUBCUTANEOUS at 08:23

## 2021-03-10 RX ADMIN — OXYCODONE 2.5 MG: 5 TABLET ORAL at 18:32

## 2021-03-10 RX ADMIN — DOCUSATE SODIUM 50MG AND SENNOSIDES 8.6MG 1 TABLET: 8.6; 5 TABLET, FILM COATED ORAL at 17:04

## 2021-03-10 RX ADMIN — INSULIN LISPRO 3 UNITS: 100 INJECTION, SOLUTION INTRAVENOUS; SUBCUTANEOUS at 12:24

## 2021-03-10 RX ADMIN — Medication 1 TABLET: at 08:22

## 2021-03-10 RX ADMIN — ACETAMINOPHEN 650 MG: 325 TABLET ORAL at 11:25

## 2021-03-10 RX ADMIN — CEFEPIME HYDROCHLORIDE 1 G: 1 INJECTION, POWDER, FOR SOLUTION INTRAMUSCULAR; INTRAVENOUS at 21:15

## 2021-03-10 RX ADMIN — INSULIN LISPRO 3 UNITS: 100 INJECTION, SOLUTION INTRAVENOUS; SUBCUTANEOUS at 17:05

## 2021-03-10 RX ADMIN — PREGABALIN 50 MG: 25 CAPSULE ORAL at 08:22

## 2021-03-10 RX ADMIN — INSULIN LISPRO 2 UNITS: 100 INJECTION, SOLUTION INTRAVENOUS; SUBCUTANEOUS at 21:39

## 2021-03-10 NOTE — PROGRESS NOTES
Hospitalist Progress Note    NAME: Jackie Kevin   :  10/6/1932   MRN:  [id number]       Assessment / Plan:  Right Hip fracture, POA due to Mechanical fall at home  ORIF 3/7 Dr Baldwin Dears:  Libby Mathews hip IMN for intertrochanteric hip fractur  X-ray hip: comminuted intertrochanteric fracture of the right hip  CT head shows no acute abnormality. DVT prophylaxis: TEDS &/or SCDs with heparin  Pain management: controlled with current regiment   PT: WBAT, SNF   Hb: baseline 11, down to 6.2 3/9 , 1 prbc; Alvarez: out 3/9 and voiding fine    BM: on bowel regiment  - preop clearance by cardiology:   Pre op evaluation: intermediate risk surgery. Baseline poor functional status. Echo noted. Recommend leximyoview prior to surgery but due to surgery was done on weekend, proceeded wo it. Doing well post op. No further hauser recommended at this tie. FU OP.    Echo noted: LVEF 14-43%, normal systolic function    Acute blood loss anemia  Expected s/p ortho surgery   Hb down to 6.2 3/9, 1 prbs , hb 8.7 today     Fever   Had low grade fever 100.8 3/9, now with 99                                                                        Cxray: No acute cardiopulmonary process seen. Retained foreign bodies  procalcitonin 0.27  Checking urine and BC   High risk pt with recent ortho surgery   no AB unless source is clear or deteriorating     Hyperkalemia resolved   LUCY on CKD   -Hyperkalemia resolved with Kayexalate and Veltassa  -Appreciate nephrology input:   Suspect LUCY on CKD, stable now  CKD sec to HTN and DM2, suspect  B/L Cr at 2.4 range.   Alvarez is out                                                                                                             -Avoid NSAIDs    Acute seizure episode  -Secondary to medication usefentanyl.  -Narcan was given in the ER along with diazepam.  Patient blood pressure dropped and the fluids was given, currently vitals are stable.  -no further episodes since ER  EEG: done 3/8   CT head negative  -DC keppra  -neuro consult appreciated: Most likely dystonic reaction to the fentanyl. Dc keppra      Diabetes type 2  Neuropathy  -BS 200s   -Hold home meds  Cont Lyrica for neuropathy   On Januvia only per med rec at home, re started   Added lantus at bedtime    Fu hba1c   -SSI with POC's     Gout: cont allopurinol   Insomnia: trazodone at home prn       Code Status: Full code  Surrogate Decision Maker: Tigist Roes- Nephew  DVT Prophylaxis:      Baseline: Ambulatory with assistancewalker at home  Disposition: SNF when medically stable; accepted to SNF; working up fever      Subjective:     Chief Complaint / Reason for Physician Visit: fu hip fracture  Remain pleasantly confused  MS at baseline   Denies pain   Denies dysuria / cough   cxray abnormal with L axillar foreign bodies, nothing on physical exam     Discussed with RN events overnight. Review of Systems:  Symptom Y/N Comments  Symptom Y/N Comments   Fever/Chills n   Chest Pain n    Poor Appetite    Edema     Cough    Abdominal Pain n    Sputum    Joint Pain     SOB/BARRON n   Pruritis/Rash     Nausea/vomit    Tolerating PT/OT     Diarrhea    Tolerating Diet     Constipation    Other       Could NOT obtain due to:      Objective:     VITALS:   Last 24hrs VS reviewed since prior progress note.  Most recent are:  Patient Vitals for the past 24 hrs:   Temp Pulse Resp BP SpO2   03/10/21 0727 98.8 °F (37.1 °C) 95 16 (!) 146/92 100 %   03/10/21 0300 99.1 °F (37.3 °C) 97 18 (!) 168/97 97 %   03/09/21 2315 98.6 °F (37 °C) 75 18 (!) 152/88 100 %   03/09/21 1917 99.5 °F (37.5 °C) 91 18 (!) 168/90 96 %   03/09/21 1556 98.7 °F (37.1 °C) 85  129/80 97 %   03/09/21 1425 99.4 °F (37.4 °C) 91 16 (!) 146/86 99 %   03/09/21 1300 99.7 °F (37.6 °C) 99  (!) 152/94 100 %   03/09/21 1222 (!) 100.8 °F (38.2 °C) (!) 101 18 (!) 158/87 100 %   03/09/21 1152 98.3 °F (36.8 °C) 88 16 (!) 142/85 100 %   03/09/21 1138 99 °F (37.2 °C) 93 16 139/85 99 %   03/09/21 1123 99.5 °F (37.5 °C) 93 18 139/86 94 %   03/09/21 1108 99 °F (37.2 °C) 95 18 (!) 141/82 97 %       Intake/Output Summary (Last 24 hours) at 3/10/2021 1107  Last data filed at 3/9/2021 1435  Gross per 24 hour   Intake    Output 525 ml   Net -525 ml        I had a face to face encounter and independently examined this patient on 3/10/2021, as outlined below:  PHYSICAL EXAM:  General: WD, WN. Alert, cooperative, no acute distress    EENT:  EOMI. Anicteric sclerae. MMM  Resp:  CTA bilaterally, no wheezing or rales. No accessory muscle use  CV:  Regular  rhythm,  No edema  GI:  Soft, Non distended, Non tender. +Bowel sounds  Neurologic:  Alert and oriented X 1-2 ( rehab), normal speech,   Psych:   Not anxious nor agitated  Skin:  No rashes. No jaundice    Reviewed most current lab test results and cultures  YES  Reviewed most current radiology test results   YES  Review and summation of old records today    NO  Reviewed patient's current orders and MAR    YES  PMH/SH reviewed - no change compared to H&P  ________________________________________________________________________  Care Plan discussed with:    Comments   Patient x    Family      RN x    Care Manager x    Consultant                       x Multidiciplinary team rounds were held today with , nursing, pharmacist and clinical coordinator. Patient's plan of care was discussed; medications were reviewed and discharge planning was addressed.      ________________________________________________________________________  Total NON critical care TIME:  35  Minutes    Total CRITICAL CARE TIME Spent:   Minutes non procedure based      Comments   >50% of visit spent in counseling and coordination of care x    ________________________________________________________________________  Denisha Marr MD     Procedures: see electronic medical records for all procedures/Xrays and details which were not copied into this note but were reviewed prior to creation of Plan. LABS:  I reviewed today's most current labs and imaging studies.   Pertinent labs include:  Recent Labs     03/10/21  0336 03/09/21  1657 03/09/21 0326 03/08/21 0138   WBC 6.6  --  6.9 8.1   HGB 8.7* 7.4* 6.2* 7.5*   HCT 25.6* 21.7* 19.2* 23.5*   *  --  109* 120*     Recent Labs     03/10/21  0336 03/09/21  0326 03/08/21  0138   * 136 136   K 4.7 4.3 4.2    106 106   CO2 25 22 23   * 157* 115*   BUN 31* 31* 28*   CREA 2.34* 2.69* 2.44*   CA 8.7 7.9* 7.7*       Signed: Beryle Busman, MD

## 2021-03-10 NOTE — PROGRESS NOTES
Problem: Mobility Impaired (Adult and Pediatric)  Goal: *Acute Goals and Plan of Care (Insert Text)  Description: FUNCTIONAL STATUS PRIOR TO ADMISSION: Patient was ambulatory with rollator at home and in community, but had had a few falls stepping up on curb in parking lot. HOME SUPPORT PRIOR TO ADMISSION: Patient lives with his girlfriend, was indep with ADLs    Physical Therapy Goals  Initiated 3/8/2021  1. Patient will move from supine to sit and sit to supine  in bed with minimal assistance/contact guard assist within 7 day(s). 2.  Patient will transfer from bed to chair and chair to bed with minimal assistance/contact guard assist using the least restrictive device within 7 day(s). 3.  Patient will perform sit to stand with minimal assistance/contact guard assist within 7 day(s). 4.  Patient will ambulate with minimal assistance/contact guard assist for 100 feet with the least restrictive device within 7 day(s). Outcome: Progressing Towards Goal   PHYSICAL THERAPY TREATMENT  Patient: [place] (60 y.o. male)  Date: 3/10/2021    Diagnosis: Hip fracture ([facility] [address] [S72.009A]        Seizure ([facility] [address] [R56.9]        LUCY (acute kidney injury) ([facility] [address] [N17.9]        Hyperkalemia [E87.5]     Procedure(s) (LRB): RIGHT FEMUR INSERTION INTRA MEDULLARY NAIL (URGENT) (Right) 3 Days Post-Op    Precautions: FALLS!!    Chart, physical therapy assessment, plan of care and goals were reviewed. ASSESSMENT: Patient continues with skilled PT services and is progressing slowly towards goals, no gross LOB (some knee buckling x2), no SOB, needs a lot of assist for bed mob, did fair with transfers, needs verbal and tactile cues for foot placement and sequence, vc's for safety and proper RW use. Current Level of Function Impacting Discharge (mobility/balance): mod assist x2         PLAN : Patient continues to benefit from skilled intervention to address the above impairments.   Continue treatment per established plan of care  to address goals. Recommendation for discharge: (in order for the patient to meet his/her long term goals) Therapy up to 5 days/week in SNF setting    This discharge recommendation: Has not yet been discussed the attending provider and/or case management    IF patient discharges home will need the following DME: rolling walker     OBJECTIVE DATA SUMMARY:     Critical Behavior:  Neurologic State: Alert, Confused  Orientation Level: Oriented to person, Disoriented to time, Disoriented to situation, Disoriented to place  Cognition: Memory loss, Follows commands     Functional Mobility Training:  Bed Mobility:  Rolling: Moderate assistance;Assist x2; Additional time  Supine to Sit: Moderate assistance;Assist x2; Additional time    Transfers:  Sit to Stand: Minimum assistance;Assist x2; Additional time  Stand to Sit: Minimum assistance;Assist x2  Bed to Chair: Minimum assistance;Assist x2; Additional time  Interventions: Tactile cues; Verbal cues  Level of Assistance: Minimum assistance;Assist x2; Additional time    Balance:  Sitting: Intact; With support  Sitting - Static: Fair (occasional)  Sitting - Dynamic: Not tested  Standing: Impaired; With support  Standing - Static: Fair;Constant support  Standing - Dynamic : Fair;Constant support    Ambulation/Gait Training:  Distance (ft): 5 Feet (ft)  Assistive Device: Gait belt;Walker, rolling  Ambulation - Level of Assistance: Moderate assistance;Assist x2; Additional time  Gait Abnormalities: Antalgic;Decreased step clearance;Trunk sway increased; Step to gait  Right Side Weight Bearing: As tolerated  Left Side Weight Bearing: Full  Base of Support: Shift to left  Stance: Right decreased  Speed/Georgette: Slow  Step Length: Left shortened;Right shortened    Therapeutic Exercises:   sitting  EXERCISE   Sets   Reps   Active Active Assist   Passive   Comments   Ankle pumps 1 10 [x] [] [] bilat   Heel raises 1 10 [x] [] [] \"   Toe tap 1 10 [x] [] [] \"   Knee ext 1 10 [x] [] [] \"   Hip flex 1 10 [x] [] [] \"     Pain Rating: pt could not rate    Activity Tolerance: Poor    After treatment patient left in no apparent distress: Sitting in chair, Call bell within reach, and Caregiver / family present    COMMUNICATION/COLLABORATION:   The patients plan of care was discussed with: Registered nurse.      Nikole Camargo PTA   Time Calculation: 30 mins

## 2021-03-10 NOTE — PROGRESS NOTES
End of Shift Note    Bedside shift change report given to CURT Carr (oncoming nurse) by Jaycee Stevens (offgoing nurse).  Report included the following information SBAR, Kardex, OR Summary, Procedure Summary, Intake/Output, MAR, Accordion, Recent Results, Med Rec Status and Cardiac Rhythm NSR    Shift worked:  2383-2353     Shift summary and any significant changes:     blood given and patient drowsy, rapid negative     Concerns for physician to address:  none     Zone phone for oncoming shift:   [phone]       Activity:  Activity Level: Up with Assistance  Number times ambulated in hallways past shift: 0  Number of times OOB to chair past shift: 0    Cardiac:   Cardiac Monitoring: Yes      Cardiac Rhythm: Normal sinus rhythm    Access:   Current line(s): PIV     Genitourinary:   Urinary status: incontinent    Respiratory:   O2 Device: Room air  Chronic home O2 use?: NO  Incentive spirometer at bedside: YES  Actual Volume (ml): 1500 ml  GI:  Last Bowel Movement Date: (prior to admit)  Current diet:  DIET DIABETIC CONSISTENT CARB Mechanical Soft  Passing flatus: YES  Tolerating current diet: YES  % Diet Eaten: 100 %    Pain Management:   Patient states pain is manageable on current regimen: YES    Skin:  [place] Score: 16  Interventions: turn team, speciality bed, increase time out of bed, PT/OT consult and internal/external urinary devices    Patient Safety:  Fall Score: Total Score: 5  Interventions: bed/chair alarm, assistive device (walker, cane, etc), gripper socks and pt to call before getting OOB  High Fall Risk: Yes    Length of Stay:  Expected LOS: 4d 7h  Actual LOS: 4      Jaycee Stevens

## 2021-03-10 NOTE — PROGRESS NOTES
Problem: Self Care Deficits Care Plan (Adult)  Goal: *Acute Goals and Plan of Care (Insert Text)  Description:     FUNCTIONAL STATUS PRIOR TO ADMISSION: Ambulates with a rollator and is independent for all ADL performance. He has fallen multiple times stepping up curb to walk on sidewalk to his apartment. HOME SUPPORT: The patient lived with his s/o. Occupational Therapy Goals  Initiated 3/8/2021  1. Patient will perform grooming seated EOB with supervision/set-up within 7 day(s). 2.  Patient will perform upper body dressing with minimal assistance  within 7 day(s). 3.  Patient will perform lower body dressing with moderate assistance, using AE PRN, within 7 day(s). 4.  Patient will perform toilet transfers with minimal assistance/contact guard assist within 7 day(s). 5.  Patient will perform all aspects of toileting with moderate assistance  within 7 day(s). 6.  Patient will perform ADL setup with moderate assistance  within 7 day(s). Outcome: Progressing Towards Goal   OCCUPATIONAL THERAPY TREATMENT  Patient: Seth Galvez (89 y.o. male)  Date: 3/10/2021  Diagnosis: Hip fracture ([facility] [S72.009A]  Seizure ([address] [R56.9]  LUCY (acute kidney injury) ([facility] [N17.9]  Hyperkalemia [E87.5] <principal problem not specified>  Procedure(s) (LRB):  RIGHT FEMUR INSERTION INTRA MEDULLARY NAIL (URGENT) (Right) 3 Days Post-Op  Precautions:    Chart, occupational therapy assessment, plan of care, and goals were reviewed. ASSESSMENT  Patient continues with skilled OT services and is slowly  progressing towards goals. Pt was seated in chair upon arrival, appeared very stiff and required cues for performing therapeutic exercises as warm up to mobility. Upon standing, pt incontinent of urine requiring assistance for cleaning up. Pt reported 9/10 pain in standing attempt. Nurse suggested returning to bed to rest--pt required maximal/total assistance for stand pivot transfer chair to bed.   Unsuccessful with standing and taking steps for transfer, likely due to pain level. Pt reported feeling cold, but was warm to touch. Nurse addressing medical needs and medications to be provided at conclusion of tx session. Current Level of Function Impacting Discharge (ADLs): generally maximal assist for adls and total assistance for transfer    Other factors to consider for discharge: medical condition. PLAN :  Patient continues to benefit from skilled intervention to address the above impairments. Continue treatment per established plan of care. to address goals. Recommend with staff: encourage OOB as able with assist X2 stand pivot      Recommendation for discharge: (in order for the patient to meet his/her long term goals)  Therapy up to 5 days/week in SNF setting    This discharge recommendation:  Has not yet been discussed the attending provider and/or case management    IF patient discharges home will need the following DME: 24 hour assistance and w/c bsc RW       SUBJECTIVE:   Patient stated it's a 9    OBJECTIVE DATA SUMMARY:   Cognitive/Behavioral Status:  Neurologic State: Alert;Confused(very Pala)  Orientation Level: Oriented to person  Cognition: Follows commands(needs increased volume to hear--decr attention)  Perception: Appears intact  Perseveration: No perseveration noted  Safety/Judgement: Decreased awareness of need for assistance;Decreased awareness of need for safety;Decreased insight into deficits; Fall prevention    Functional Mobility and Transfers for ADLs:  Bed Mobility:  Rolling: (pt seated upon arrival)    Transfers:  Sit to Stand: Maximal assistance Additional time using RW for support. Pt is very Pala needs commands repeated. Bed to Chair: Maximal assistance;Assist x2; Additional time. Unable to take steps, performed stand pivot with assist X2 to 3    Balance:  Sitting: Intact; With support  Sitting - Static: Fair (occasional)  Sitting - Dynamic: (able to reach to knees comfortably, )  Standing: Impaired; With support  Standing - Static: Fair;Constant support  Standing - Dynamic : Constant support;Poor(max A X2 to 3 for stand pivot chair to bed)    ADL Intervention:                 Lower Body Bathing  Perineal  : Supervision(anterior while seated)  Position Performed: Seated in chair  Cues: Verbal cues provided  Lower Body : Maximum assistance  Position Performed: Seated in chair    Upper Body [address]: Moderate assistance    Lower Body Dressing Assistance  Socks: Total assistance (dependent)  Position Performed: Seated in chair(post episode of incontinence while standing)    Toileting  Bladder Hygiene: Set-up; Stand-by assistance(incontinent post standing, total A to clean up)  Clothing Management: Moderate assistance  Cues: Tactile cues provided;Verbal cues provided;Visual cues provided  Adaptive Equipment: (used urinal)    Cognitive Retraining  Attention to Task: Distractibility(due to [employer])  Following Commands: Follows one step commands/directions(if able to hear)  Safety/Judgement: Decreased awareness of need for assistance;Decreased awareness of need for safety;Decreased insight into deficits; Fall prevention    Therapeutic Exercises:   Therapeutic exercises performed prior to adl mobility due to pt appearing very s tiff    Pain:  Reported 9/10 at end of session, nursing infomred and aware    Activity Tolerance:   Fair, SpO2 stable on RA, and requires rest breaks    After treatment patient left in no apparent distress:   Supine in bed, Call bell within reach, Caregiver / family present, Side rails x 3, and nurse present    COMMUNICATION/COLLABORATION:   The patients plan of care was discussed with: Physical therapy assistant and Registered nurse.      SUNITA Campos/L  Time Calculation: 35 mins

## 2021-03-10 NOTE — PROGRESS NOTES
Bedside and Verbal shift change report given to CURT Castellanos (oncoming nurse) by Lobo Lowry (offgoing nurse). Report included the following information SBAR, Kardex, OR Summary, Procedure Summary, Intake/Output, MAR, Recent Results and Med Rec Status.
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
## 2021-03-10 NOTE — PROGRESS NOTES
Comprehensive Nutrition Assessment    Type and Reason for Visit: Initial, Consult    Nutrition Recommendations/Plan:   Continue CCD, mechanical soft  RD to add Glucerna s/p hip surgery  Please document % meals and supplements consumed in flowsheet I/O's under intake     Nutrition Assessment:      Consult received for ONS. Chart reviewed. Pt noted for hip fracture, LUCY on CKD3, DM2, HTN, anemia, neuropathy. S/p hip surgery. Pt pleasantly confused at bedside visit. He reports a good appetite. Pt is edentulous, has mechanical soft diet ordered and he reports no trouble with this texture. Pt agreeable to Glucerna shake s/p surgery. No GI complaints, bowel regimen on board. Patient Vitals for the past 72 hrs:   % Diet Eaten   03/09/21 0825 100 %   03/07/21 1946 0 %     Wt Readings from Last 5 Encounters:   03/08/21 76.4 kg (168 lb 7 oz)   08/17/19 68.9 kg (152 lb)   10/30/18 61.8 kg (136 lb 3.2 oz)   08/29/18 59.9 kg (132 lb)   06/09/18 68.9 kg (152 lb)   ]    Estimated Daily Nutrient Needs:  Energy (kcal): 1790 kcal (BMR x 1. 3AF); Weight Used for Energy Requirements: Current  Protein (g): 76-92g (1-1.2g/kg); Weight Used for Protein Requirements: Current  Fluid (ml/day): 1800mL; Method Used for Fluid Requirements: 1 ml/kcal      Nutrition Related Findings:  Labs: -273mg/dL. Meds: oscal +D3, B12, lantus, humalog, miralax, pericolace. Edema 2+RLE. BM 3/9. Wounds:    Surgical incision       Current Nutrition Therapies:  DIET DIABETIC CONSISTENT CARB Mechanical Soft    Anthropometric Measures:  · Height:  5' 6\" (167.6 cm)  · Current Body Wt:  76.4 kg (168 lb 6.9 oz)   · Ideal Body Wt:  142 lbs:  118.6 %   · BMI Category: Overweight (BMI 25.0-29. 9)       Nutrition Diagnosis:   · Increased nutrient needs related to (wound healing) as evidenced by (s/p hip surgery)      Nutrition Interventions:   Food and/or Nutrient Delivery: Continue current diet, Start oral nutrition supplement  Nutrition Education and Counseling: No recommendations at this time  Coordination of Nutrition Care: Continue to monitor while inpatient    Goals:  PO intake >75% meals and ONS next 5-7 days       Nutrition Monitoring and Evaluation:   Behavioral-Environmental Outcomes: None identified  Food/Nutrient Intake Outcomes: Food and nutrient intake, Supplement intake  Physical Signs/Symptoms Outcomes: Biochemical data, Skin, Weight, GI status, Fluid status or edema    Discharge Planning:    Continue current diet, Continue oral nutrition supplement     Electronically signed by Srinivas Butler RD on 3/10/2021 at 10:33 AM    Contact: [phone]  Pager [phone]

## 2021-03-10 NOTE — PROGRESS NOTES
ORTHO - Progress Note  Post Op day: 3 Days [address]     [id number]  male    80 y.o.    10/6/1932    Admit date:3/5/2021  Date of Surgery:3/7/2021   Procedures:Procedure(s):RIGHT FEMUR INSERTION INTRA MEDULLARY NAIL (URGENT)  Surgeon:Surgeon(s) and Role:   Maria Del Carmen Alvarado, DO - Primary        SUBJECTIVE:     Christiano Zuniga is a 80 y.o. male resting in the bed. Patient has complaints of appropriate post-op pain, smiling and very positive. Denies F/C, nausea, vomiting, dizziness, lightheadedness, chest pain, or shortness of breath. OBJECTIVE:       Physical Exam:  General: alert, cooperative, no distress. Gastrointestinal:  non-distended . Cardiovascular: equal pulses in the  lower extremities,  Brisk cap refill in all distal extremities   Genitourinary: Voiding independently   Respiratory: No respiratory distress   Neurological:Neurovascular exam within normal limits. Senstion intact: LE bilat. Motor: + DF/PF/EHL. Musculoskeletal: Thelma's sign negative in bilateral lower extremities. Calves soft, supple, non-tender upon palpation or with passive stretch. Dressing/Wound:  Trace drainage, dry and intact. No erythema, + right thigh swelling. No palpable collection or sig ttp.   Vital Signs:         Patient Vitals for the past 8 hrs:   BP Temp Pulse Resp SpO2 Height   03/10/21 1119 (!) 153/90 (!) 100.9 °F (38.3 °C) 94 16 99 %    03/10/21 1030      5' 6\" (1.676 m)   03/10/21 0727 (!) 146/92 98.8 °F (37.1 °C) 95 16 100 %                                           Temp (24hrs), Av.3 °F (37.4 °C), Min:98.6 °F (37 °C), Max:100.9 °F (38.3 °C)      Labs:        Recent Labs     03/10/21  0336   HCT 25.6*   HGB 8.7*     PT/OT:              ASSESSMENT / PLAN:   Active Problems:    Hyperkalemia (3/5/2021)      Hip fracture ([address] (3/5/2021)      Seizure ([address] (3/5/2021)      LUCY (acute kidney injury) ([facility] [address] (3/5/2021)       -  Hb improved after x-fuse PRBCs 3/9  -  Continue PT/OT WBAT  -  DVT prophylaxis- SCD w/ Heparin  -  DC planning - SNF likely     Signed By: Mahalia Kanner, PA-C

## 2021-03-10 NOTE — PROGRESS NOTES
Nephrology Progress Note  Johnnie Milian     [email or web address]  Phone - [phone]   Patient: Brittanie Anders    YOB: 1932        Date- 3/10/2021   Admit Date: 3/5/2021  CC: Follow up for  LUCY on CKD          IMPRESSION & PLAN:    LUCY on CKD    DM2   HTN   Hip fracture    Anemia    PLAN-   Suspect LUCY on CKD.  Cr has been stable along with lytes   CKD sec to HTN and DM2, suspect  B/L Cr at 2.4 range.  Voiding well without Alvarez     Subjective: Interval History:   -  Seen this am. Feels better    Objective:   Vitals:    03/09/21 1917 03/09/21 2315 03/10/21 0300 03/10/21 0727   BP: (!) 168/90 (!) 152/88 (!) 168/97 (!) 146/92   Pulse: 91 75 97 95   Resp: 18 18 18 16   Temp: 99.5 °F (37.5 °C) 98.6 °F (37 °C) 99.1 °F (37.3 °C) 98.8 °F (37.1 °C)   SpO2: 96% 100% 97% 100%   Weight:       Height:          03/09 0701 - 03/10 0700  In: -   Out: 5389 [Urine:1125]  Last 3 Recorded Weights in this Encounter    03/06/21 1529 03/07/21 0312 03/08/21 0307   Weight: 76.7 kg (169 lb 1.5 oz) 74.9 kg (165 lb 3.2 oz) 76.4 kg (168 lb 7 oz)      Physical exam:   GEN: NAD  NECK- Supple, no mass  RESP: No wheezing, Clear b/l  CVS: S1,S2  RRR  NEURO: Normal speech, Non focal  EXT: No Edema       Chart reviewed. Pertinent Notes reviewed. Data Review :  Recent Labs     03/10/21  0336 03/09/21  0326 03/08/21  0138   * 136 136   K 4.7 4.3 4.2    106 106   CO2 25 22 23   BUN 31* 31* 28*   CREA 2.34* 2.69* 2.44*   * 157* 115*   CA 8.7 7.9* 7.7*     Recent Labs     03/10/21  0336 03/09/21  1657 03/09/21  0326 03/08/21  0138   WBC 6.6  --  6.9 8.1   HGB 8.7* 7.4* 6.2* 7.5*   HCT 25.6* 21.7* 19.2* 23.5*   *  --  109* 120*     No results for input(s): FE, TIBC, PSAT, FERR in the last 72 hours.    Medication list  reviewed  Current Facility-Administered Medications   Medication Dose Route Frequency    0.9% sodium chloride infusion 250 mL  250 mL IntraVENous PRN    alogliptin (NESINA) tablet 6.25 mg  6.25 mg Oral DAILY    insulin glargine (LANTUS) injection 5 Units  5 Units SubCUTAneous QHS    artificial tears (dextran-hypromellose-glycerin) (GENTEAL) ophthalmic solution 1 Drop  1 Drop Both Eyes QID PRN    alcohol 62% (NOZIN) nasal  1 Ampule  1 Ampule Topical Q12H    sodium chloride (NS) flush 5-40 mL  5-40 mL IntraVENous Q8H    sodium chloride (NS) flush 5-40 mL  5-40 mL IntraVENous PRN    acetaminophen (TYLENOL) tablet 650 mg  650 mg Oral Q6H    naloxone (NARCAN) injection 0.4 mg  0.4 mg IntraVENous PRN    ondansetron (ZOFRAN ODT) tablet 4 mg  4 mg Oral Q4H PRN    calcium-vitamin D (OS-EDUARDO +D3) 500 mg-200 unit per tablet 1 Tab  1 Tab Oral TID WITH MEALS    senna-docusate (PERICOLACE) 8.6-50 mg per tablet 1 Tab  1 Tab Oral BID    polyethylene glycol (MIRALAX) packet 17 g  17 g Oral DAILY    bisacodyL (DULCOLAX) suppository 10 mg  10 mg Rectal DAILY PRN    heparin (porcine) injection 5,000 Units  5,000 Units SubCUTAneous Q8H    oxyCODONE IR (ROXICODONE) tablet 2.5 mg  2.5 mg Oral Q4H PRN    allopurinoL (ZYLOPRIM) tablet 100 mg  100 mg Oral DAILY    pregabalin (LYRICA) capsule 50 mg  50 mg Oral DAILY    traZODone (DESYREL) tablet 50 mg  50 mg Oral QHS PRN    latanoprost (XALATAN) 0.005 % ophthalmic solution 1 Drop  1 Drop Both Eyes QHS    cyanocobalamin (VITAMIN B12) tablet 1,000 mcg  1,000 mcg Oral DAILY    acetaminophen (TYLENOL) tablet 650 mg  650 mg Oral Q6H PRN    Or    acetaminophen (TYLENOL) suppository 650 mg  650 mg Rectal Q6H PRN    polyethylene glycol (MIRALAX) packet 17 g  17 g Oral DAILY PRN    promethazine (PHENERGAN) tablet 12.5 mg  12.5 mg Oral Q6H PRN    insulin lispro (HUMALOG) injection   SubCUTAneous AC&HS    glucose chewable tablet 16 g  4 Tab Oral PRN    dextrose (D50W) injection syrg 12.5-25 g  12.5-25 g IntraVENous PRN    glucagon (GLUCAGEN) injection 1 mg  1 mg IntraMUSCular PRN          Gail Aiken Oleg Li, [address] [facility]  [facility]  [address]  [address] [facility] [address], [address]  Phone - [phone]               Fax - [phone]

## 2021-03-10 NOTE — PROGRESS NOTES
3/10/2021 10:20 AM    Admit Date: 3/5/2021    Admit Diagnosis: Hip fracture ([facility] Marta Bowman; [facility] [R56.9]; LUCY (acute kidney injury) ([facility] [N17.9]; Hyperkalemia [E87.5]    Subjective:     Talat Woodall   denies chest pain, chest pressure/discomfort, dyspnea, palpitations, irregular heart beats, near-syncope, syncope, fatigue, orthopnea, paroxysmal nocturnal dyspnea, exertional chest pressure/discomfort, claudication.     Visit Vitals  BP (!) 146/92   Pulse 95   Temp 98.8 °F (37.1 °C)   Resp 16   Ht 5' 6\" (1.676 m)   Wt 168 lb 7 oz (76.4 kg)   SpO2 100%   BMI 27.19 kg/m²     Current Facility-Administered Medications   Medication Dose Route Frequency    0.9% sodium chloride infusion 250 mL  250 mL IntraVENous PRN    alogliptin (NESINA) tablet 6.25 mg  6.25 mg Oral DAILY    insulin glargine (LANTUS) injection 5 Units  5 Units SubCUTAneous QHS    artificial tears (dextran-hypromellose-glycerin) (GENTEAL) ophthalmic solution 1 Drop  1 Drop Both Eyes QID PRN    alcohol 62% (NOZIN) nasal  1 Ampule  1 Ampule Topical Q12H    sodium chloride (NS) flush 5-40 mL  5-40 mL IntraVENous Q8H    sodium chloride (NS) flush 5-40 mL  5-40 mL IntraVENous PRN    acetaminophen (TYLENOL) tablet 650 mg  650 mg Oral Q6H    naloxone (NARCAN) injection 0.4 mg  0.4 mg IntraVENous PRN    ondansetron (ZOFRAN ODT) tablet 4 mg  4 mg Oral Q4H PRN    calcium-vitamin D (OS-EDUARDO +D3) 500 mg-200 unit per tablet 1 Tab  1 Tab Oral TID WITH MEALS    senna-docusate (PERICOLACE) 8.6-50 mg per tablet 1 Tab  1 Tab Oral BID    polyethylene glycol (MIRALAX) packet 17 g  17 g Oral DAILY    bisacodyL (DULCOLAX) suppository 10 mg  10 mg Rectal DAILY PRN    heparin (porcine) injection 5,000 Units  5,000 Units SubCUTAneous Q8H    oxyCODONE IR (ROXICODONE) tablet 2.5 mg  2.5 mg Oral Q4H PRN    allopurinoL (ZYLOPRIM) tablet 100 mg  100 mg Oral DAILY    pregabalin (LYRICA) capsule 50 mg  50 mg Oral DAILY    traZODone (DESYREL) tablet 50 mg  50 mg Oral QHS PRN    latanoprost (XALATAN) 0.005 % ophthalmic solution 1 Drop  1 Drop Both Eyes QHS    cyanocobalamin (VITAMIN B12) tablet 1,000 mcg  1,000 mcg Oral DAILY    acetaminophen (TYLENOL) tablet 650 mg  650 mg Oral Q6H PRN    Or    acetaminophen (TYLENOL) suppository 650 mg  650 mg Rectal Q6H PRN    polyethylene glycol (MIRALAX) packet 17 g  17 g Oral DAILY PRN    promethazine (PHENERGAN) tablet 12.5 mg  12.5 mg Oral Q6H PRN    insulin lispro (HUMALOG) injection   SubCUTAneous AC&HS    glucose chewable tablet 16 g  4 Tab Oral PRN    dextrose (D50W) injection syrg 12.5-25 g  12.5-25 g IntraVENous PRN    glucagon (GLUCAGEN) injection 1 mg  1 mg IntraMUSCular PRN         Objective:      Visit Vitals  BP (!) 146/92   Pulse 95   Temp 98.8 °F (37.1 °C)   Resp 16   Ht 5' 6\" (1.676 m)   Wt 168 lb 7 oz (76.4 kg)   SpO2 100%   BMI 27.19 kg/m²       Physical Exam:  Resting comfortably. No resp distress.    Heart: regular rate and rhythm, S1, S2 normal, no murmur, click, rub or gallop  Lungs: clear to auscultation bilaterally  Neurologic: Grossly normal    Data Review:   Labs:    Recent Results (from the past 24 hour(s))   GLUCOSE, POC    Collection Time: 03/09/21 11:22 AM   Result Value Ref Range    Glucose (POC) 273 (H) 65 - 100 mg/dL    Performed by Karen Hernandez (PCT)    COVID-19 RAPID TEST    Collection Time: 03/09/21  3:17 PM   Result Value Ref Range    Specimen source Please find results under separate order      COVID-19 rapid test Not detected NOTD     GLUCOSE, POC    Collection Time: 03/09/21  4:02 PM   Result Value Ref Range    Glucose (POC) 199 (H) 65 - 100 mg/dL    Performed by Karen Hernandez (PCT)    HGB & HCT    Collection Time: 03/09/21  4:57 PM   Result Value Ref Range    HGB 7.4 (L) 12.1 - 17.0 g/dL    HCT 21.7 (L) 36.6 - 50.3 %   GLUCOSE, POC    Collection Time: 03/09/21  9:20 PM   Result Value Ref Range    Glucose (POC) 194 (H) 65 - 100 mg/dL Performed by Rene Weaver    CBC W/O DIFF    Collection Time: 03/10/21  3:36 AM   Result Value Ref Range    WBC 6.6 4.1 - 11.1 K/uL    RBC 2.81 (L) 4.10 - 5.70 M/uL    HGB 8.7 (L) 12.1 - 17.0 g/dL    HCT 25.6 (L) 36.6 - 50.3 %    MCV 91.1 80.0 - 99.0 FL    MCH 31.0 26.0 - 34.0 PG    MCHC 34.0 30.0 - 36.5 g/dL    RDW 13.9 11.5 - 14.5 %    PLATELET 430 (L) 280 - 400 K/uL    MPV 11.4 8.9 - 12.9 FL    NRBC 0.0 0  WBC    ABSOLUTE NRBC 0.00 0.00 - 7.29 K/uL   METABOLIC PANEL, BASIC    Collection Time: 03/10/21  3:36 AM   Result Value Ref Range    Sodium 134 (L) 136 - 145 mmol/L    Potassium 4.7 3.5 - 5.1 mmol/L    Chloride 103 97 - 108 mmol/L    CO2 25 21 - 32 mmol/L    Anion gap 6 5 - 15 mmol/L    Glucose 172 (H) 65 - 100 mg/dL    BUN 31 (H) 6 - 20 MG/DL    Creatinine 2.34 (H) 0.70 - 1.30 MG/DL    BUN/Creatinine ratio 13 12 - 20      GFR est AA 32 (L) >60 ml/min/1.73m2    GFR est non-AA 26 (L) >60 ml/min/1.73m2    Calcium 8.7 8.5 - 10.1 MG/DL   GLUCOSE, POC    Collection Time: 03/10/21  6:41 AM   Result Value Ref Range    Glucose (POC) 214 (H) 65 - 100 mg/dL    Performed by Rene Weaver        Telemetry: SR      Assessment:     Active Problems:    Hyperkalemia (3/5/2021)      Hip fracture (HCC) (3/5/2021)      Se[facility] ([facility] (3/5/2021)      LUCY (acute kidney injury) ([facility] (3/5/2021)        Plan:     Stable post op. S/p BT. Cr stable. No further cardiac work up. Will f/u as out pt. Please call if can be of any further assistance.

## 2021-03-11 ENCOUNTER — APPOINTMENT (OUTPATIENT)
Dept: ULTRASOUND IMAGING | Age: 86
DRG: 481 | End: 2021-03-11
Attending: HOSPITALIST
Payer: MEDICARE

## 2021-03-11 LAB
ANION GAP SERPL CALC-SCNC: 6 MMOL/L (ref 5–15)
BUN SERPL-MCNC: 34 MG/DL (ref 6–20)
BUN/CREAT SERPL: 14 (ref 12–20)
CALCIUM SERPL-MCNC: 8.3 MG/DL (ref 8.5–10.1)
CHLORIDE SERPL-SCNC: 100 MMOL/L (ref 97–108)
CO2 SERPL-SCNC: 26 MMOL/L (ref 21–32)
CREAT SERPL-MCNC: 2.35 MG/DL (ref 0.7–1.3)
CREAT UR-MCNC: 59.7 MG/DL
ERYTHROCYTE [DISTWIDTH] IN BLOOD BY AUTOMATED COUNT: 13.8 % (ref 11.5–14.5)
GLUCOSE BLD STRIP.AUTO-MCNC: 189 MG/DL (ref 65–100)
GLUCOSE BLD STRIP.AUTO-MCNC: 224 MG/DL (ref 65–100)
GLUCOSE BLD STRIP.AUTO-MCNC: 243 MG/DL (ref 65–100)
GLUCOSE BLD STRIP.AUTO-MCNC: 245 MG/DL (ref 65–100)
GLUCOSE SERPL-MCNC: 153 MG/DL (ref 65–100)
HCT VFR BLD AUTO: 23.9 % (ref 36.6–50.3)
HGB BLD-MCNC: 7.8 G/DL (ref 12.1–17)
MCH RBC QN AUTO: 30.1 PG (ref 26–34)
MCHC RBC AUTO-ENTMCNC: 32.6 G/DL (ref 30–36.5)
MCV RBC AUTO: 92.3 FL (ref 80–99)
NRBC # BLD: 0 K/UL (ref 0–0.01)
NRBC BLD-RTO: 0 PER 100 WBC
OSMOLALITY UR: 280 MOSM/KG H2O
PLATELET # BLD AUTO: 157 K/UL (ref 150–400)
PMV BLD AUTO: 10.5 FL (ref 8.9–12.9)
POTASSIUM SERPL-SCNC: 4.6 MMOL/L (ref 3.5–5.1)
RBC # BLD AUTO: 2.59 M/UL (ref 4.1–5.7)
SERVICE CMNT-IMP: ABNORMAL
SODIUM SERPL-SCNC: 132 MMOL/L (ref 136–145)
SODIUM UR-SCNC: 50 MMOL/L
WBC # BLD AUTO: 6.9 K/UL (ref 4.1–11.1)

## 2021-03-11 PROCEDURE — 84300 ASSAY OF URINE SODIUM: CPT

## 2021-03-11 PROCEDURE — 74011636637 HC RX REV CODE- 636/637: Performed by: ORTHOPAEDIC SURGERY

## 2021-03-11 PROCEDURE — 36415 COLL VENOUS BLD VENIPUNCTURE: CPT

## 2021-03-11 PROCEDURE — 74011250637 HC RX REV CODE- 250/637: Performed by: ORTHOPAEDIC SURGERY

## 2021-03-11 PROCEDURE — 74011250637 HC RX REV CODE- 250/637: Performed by: PHYSICIAN ASSISTANT

## 2021-03-11 PROCEDURE — 97116 GAIT TRAINING THERAPY: CPT

## 2021-03-11 PROCEDURE — 93970 EXTREMITY STUDY: CPT

## 2021-03-11 PROCEDURE — 65270000029 HC RM PRIVATE

## 2021-03-11 PROCEDURE — 85027 COMPLETE CBC AUTOMATED: CPT

## 2021-03-11 PROCEDURE — 74011250637 HC RX REV CODE- 250/637: Performed by: HOSPITALIST

## 2021-03-11 PROCEDURE — 74011000258 HC RX REV CODE- 258: Performed by: HOSPITALIST

## 2021-03-11 PROCEDURE — 74011250636 HC RX REV CODE- 250/636: Performed by: HOSPITALIST

## 2021-03-11 PROCEDURE — 94760 N-INVAS EAR/PLS OXIMETRY 1: CPT

## 2021-03-11 PROCEDURE — 80048 BASIC METABOLIC PNL TOTAL CA: CPT

## 2021-03-11 PROCEDURE — 83935 ASSAY OF URINE OSMOLALITY: CPT

## 2021-03-11 PROCEDURE — 74011636637 HC RX REV CODE- 636/637: Performed by: HOSPITALIST

## 2021-03-11 PROCEDURE — 82962 GLUCOSE BLOOD TEST: CPT

## 2021-03-11 PROCEDURE — 97110 THERAPEUTIC EXERCISES: CPT

## 2021-03-11 PROCEDURE — 97530 THERAPEUTIC ACTIVITIES: CPT

## 2021-03-11 PROCEDURE — 74011250636 HC RX REV CODE- 250/636: Performed by: ORTHOPAEDIC SURGERY

## 2021-03-11 PROCEDURE — 74011250636 HC RX REV CODE- 250/636: Performed by: INTERNAL MEDICINE

## 2021-03-11 PROCEDURE — 82570 ASSAY OF URINE CREATININE: CPT

## 2021-03-11 PROCEDURE — 74011250637 HC RX REV CODE- 250/637: Performed by: INTERNAL MEDICINE

## 2021-03-11 RX ADMIN — ALOGLIPTIN 6.25 MG: 6.25 TABLET, FILM COATED ORAL at 08:47

## 2021-03-11 RX ADMIN — OXYCODONE 5 MG: 5 TABLET ORAL at 17:18

## 2021-03-11 RX ADMIN — Medication 1 TABLET: at 17:18

## 2021-03-11 RX ADMIN — DOCUSATE SODIUM 50MG AND SENNOSIDES 8.6MG 1 TABLET: 8.6; 5 TABLET, FILM COATED ORAL at 08:32

## 2021-03-11 RX ADMIN — INSULIN LISPRO 3 UNITS: 100 INJECTION, SOLUTION INTRAVENOUS; SUBCUTANEOUS at 12:20

## 2021-03-11 RX ADMIN — CYANOCOBALAMIN TAB 500 MCG 1000 MCG: 500 TAB at 08:31

## 2021-03-11 RX ADMIN — CEFEPIME HYDROCHLORIDE 1 G: 1 INJECTION, POWDER, FOR SOLUTION INTRAMUSCULAR; INTRAVENOUS at 08:47

## 2021-03-11 RX ADMIN — ACETAMINOPHEN 650 MG: 325 TABLET ORAL at 05:02

## 2021-03-11 RX ADMIN — OXYCODONE 5 MG: 5 TABLET ORAL at 05:02

## 2021-03-11 RX ADMIN — HEPARIN SODIUM 5000 UNITS: 5000 INJECTION INTRAVENOUS; SUBCUTANEOUS at 17:18

## 2021-03-11 RX ADMIN — IRON SUCROSE 200 MG: 20 INJECTION, SOLUTION INTRAVENOUS at 12:20

## 2021-03-11 RX ADMIN — PREGABALIN 50 MG: 25 CAPSULE ORAL at 08:31

## 2021-03-11 RX ADMIN — Medication 1 TABLET: at 12:20

## 2021-03-11 RX ADMIN — EPOETIN ALFA-EPBX 20000 UNITS: 10000 INJECTION, SOLUTION INTRAVENOUS; SUBCUTANEOUS at 22:14

## 2021-03-11 RX ADMIN — CEFEPIME HYDROCHLORIDE 1 G: 1 INJECTION, POWDER, FOR SOLUTION INTRAMUSCULAR; INTRAVENOUS at 22:10

## 2021-03-11 RX ADMIN — Medication 10 ML: at 05:06

## 2021-03-11 RX ADMIN — Medication 10 ML: at 22:11

## 2021-03-11 RX ADMIN — ACETAMINOPHEN 650 MG: 325 TABLET ORAL at 17:18

## 2021-03-11 RX ADMIN — Medication 10 ML: at 15:08

## 2021-03-11 RX ADMIN — HEPARIN SODIUM 5000 UNITS: 5000 INJECTION INTRAVENOUS; SUBCUTANEOUS at 08:32

## 2021-03-11 RX ADMIN — ACETAMINOPHEN 650 MG: 325 TABLET ORAL at 22:09

## 2021-03-11 RX ADMIN — ALLOPURINOL 100 MG: 100 TABLET ORAL at 08:32

## 2021-03-11 RX ADMIN — DEXTRAN 70, GLYCERIN, HYPROMELLOSE 1 DROP: 1; 2; 3 SOLUTION/ DROPS OPHTHALMIC at 22:10

## 2021-03-11 RX ADMIN — HEPARIN SODIUM 5000 UNITS: 5000 INJECTION INTRAVENOUS; SUBCUTANEOUS at 00:21

## 2021-03-11 RX ADMIN — VANCOMYCIN HYDROCHLORIDE 750 MG: 750 INJECTION, POWDER, LYOPHILIZED, FOR SOLUTION INTRAVENOUS at 15:08

## 2021-03-11 RX ADMIN — ACETAMINOPHEN 650 MG: 325 TABLET ORAL at 00:19

## 2021-03-11 RX ADMIN — INSULIN LISPRO 2 UNITS: 100 INJECTION, SOLUTION INTRAVENOUS; SUBCUTANEOUS at 08:32

## 2021-03-11 RX ADMIN — INSULIN GLARGINE 5 UNITS: 100 INJECTION, SOLUTION SUBCUTANEOUS at 22:09

## 2021-03-11 RX ADMIN — Medication 1 TABLET: at 08:31

## 2021-03-11 RX ADMIN — TRAZODONE HYDROCHLORIDE 50 MG: 50 TABLET ORAL at 22:09

## 2021-03-11 RX ADMIN — INSULIN LISPRO 2 UNITS: 100 INJECTION, SOLUTION INTRAVENOUS; SUBCUTANEOUS at 22:10

## 2021-03-11 RX ADMIN — POLYETHYLENE GLYCOL 3350 17 G: 17 POWDER, FOR SOLUTION ORAL at 08:32

## 2021-03-11 RX ADMIN — DOCUSATE SODIUM 50MG AND SENNOSIDES 8.6MG 1 TABLET: 8.6; 5 TABLET, FILM COATED ORAL at 17:18

## 2021-03-11 RX ADMIN — INSULIN LISPRO 3 UNITS: 100 INJECTION, SOLUTION INTRAVENOUS; SUBCUTANEOUS at 17:18

## 2021-03-11 RX ADMIN — Medication 1 AMPULE: at 22:09

## 2021-03-11 RX ADMIN — Medication 1 AMPULE: at 08:31

## 2021-03-11 RX ADMIN — TRAZODONE HYDROCHLORIDE 50 MG: 50 TABLET ORAL at 00:19

## 2021-03-11 RX ADMIN — ACETAMINOPHEN 650 MG: 325 TABLET ORAL at 12:20

## 2021-03-11 NOTE — PROGRESS NOTES
Po hip fracture. Pain improving. Still slow with pt  hgb 7.8    Patient Vitals for the past 24 hrs:   Temp Pulse Resp BP SpO2   03/11/21 0725 98.5 °F (36.9 °C) 75 16 123/63 98 %   03/11/21 0330 98.9 °F (37.2 °C) 82 18 135/73 100 %   03/10/21 1928 98.5 °F (36.9 °C) 100 18 134/76 100 %   03/10/21 1538 99.7 °F (37.6 °C) 99 15 (!) 152/77 100 %   03/10/21 1119 (!) 100.9 °F (38.3 °C) 94 16 (!) 153/90 99 %     Dressings clean and dry  Musculoskeletal: Thelma's sign negative in bilateral lower extremities. Calves soft, supple, non-tender upon palpation or with passive stretch. ASSESSMENT: Patient continues with skilled PT services and is progressing slowly towards goals, no gross LOB (some knee buckling x2), no SOB, needs a lot of assist for bed mob, did fair with transfers, needs verbal and tactile cues for foot placement and sequence, vc's for safety and proper RW use.     Current Level of Function Impacting Discharge (mobility/balance): mod assist x2          PLAN : Patient continues to benefit from skilled intervention to address the above impairments.   Continue treatment per established plan of care  to address goals.     Recommendation for discharge: (in order for the patient to meet his/her long term goals) Therapy up to 5 days/week in SNF setting     This discharge recommendation: Has not yet been discussed the attending provider and/or case management     IF patient discharges home will need the following DME: rolling walker     oob with pt  Dc - snf  wbat

## 2021-03-11 NOTE — CONSULTS
Johnnie Rukhsana         NAME:Jabari Cash  [facility]:[id number]   :10/6/1932     Pt seen                        Matthew Gibson MD  [facility]  [facility]  [facility], [facility], [address]  Phone - [phone]         Fax - [phone] [facility]  [address] [place] [address]  Phone - [phone]        Fax - [phone]     [email or web address]

## 2021-03-11 NOTE — PROGRESS NOTES
Transition of Care Plan:  Disposition: SNF- [facility]  Follow up appointments: PCP, Ortho   DME needed: n/a  Transportation at Discharge: likely BLS  [place] or means to access home:  N/A    IM Medicare letter: To be given prior to d/c  Caregiver Contact: DaughterSean ([phone]). Discharge Caregiver contacted prior to discharge? CM will contact pt's cg 24 hours prior to d/c     [address] [place] [address] has accepted patient for SNF. Insurance Dell Stade has been obtained. CM informed [facility] LILIANA/Aaron liaison that we are still waiting for patient to be medically stable for d/c, per MD. CM requested that the insurance Dell Stade is kept up with in the event patient d/c over the weekend.      If patient does not d/c tomorrow, 3/12, he will need a new COVID test, which can be a RAPID test.     Cyn Holcomb, [facility], [facility]

## 2021-03-11 NOTE — PROGRESS NOTES
Spiritual Care Assessment/Progress Note  [facility]      NAME: Jabari Caruso      MRN: [id number]  AGE: 88 y.o. SEX: male  Jewish Affiliation: Tenriism   Language: English     3/11/2021     Total Time (in minutes): 5     Spiritual Assessment begun in [facility] through conversation with:         [x]Patient        [x] Family    [] Friend(s)        Reason for Consult: Initial/Spiritual assessment, patient floor     Spiritual beliefs: (Please include comment if needed)     [] Identifies with a harika tradition:         [] Supported by a harika community:            [] Claims no spiritual orientation:           [] Seeking spiritual identity:                [] Adheres to an individual form of spirituality:           [x] Not able to assess:                           Identified resources for coping:      [] Prayer                               [] Music                  [] Guided Imagery     [] Family/friends                 [] Pet visits     [] Devotional reading                         [x] Unknown     [] Other:                                              Interventions offered during this visit: (See comments for more details)    Patient Interventions: Initial/Spiritual assessment, patient floor, Initial visit, Affirmation of emotions/emotional suffering     Family/Friend(s): Initial Assessment, Affirmation of emotions/emotional suffering     Plan of Care:     [] Support spiritual and/or cultural needs    [] Support AMD and/or advance care planning process      [] Support grieving process   [] Coordinate Rites and/or Rituals    [] Coordination with community clergy   [] No spiritual needs identified at this time   [] Detailed Plan of Care below (See Comments)  [] Make referral to Music Therapy  [] Make referral to Pet Therapy     [] Make referral to Addiction services  [] Make referral to [facility]  [] Make referral to Spiritual Care Partner  [] No future visits requested        [x]  Follow up upon further referrals     Comments:     Initial Spiritual Care Assessment visit for Mr. Compa Shepherd in [phone]. Patient and male family member were present during the visit. Patient shared about his ongoing pain,  reminded him of [facility]'s caring hands who are working together to make him feel better. Advised of  availability.       [id number] [facility] Chase Chamberlain, M.S., Th.M.  Spiritual Care Provider   Paging Service [phone])

## 2021-03-11 NOTE — PROGRESS NOTES
Physician Progress Note      PATIENT:               Yvonne Nicole  CSN #:                  [id number]  :                       10/6/1932  ADMIT DATE:       3/5/2021 4:26 PM  [address] [place] DATE:  RESPONDING  PROVIDER #:        AFSANEH Aguilar MD        QUERY TEXT:    Stage of Chronic Kidney Disease: Please provide further specificity, if known. Clinical indicators include: ckd, cr, bun, creatinine, chronic kidney disease, creatinines  Options provided:  -- Chronic kidney disease stage 1  -- Chronic kidney disease stage 2  -- Chronic kidney disease stage 3  -- Chronic kidney disease stage 3a  -- Chronic kidney disease stage 3b  -- Chronic kidney disease stage 4  -- Chronic kidney disease stage 5  -- Chronic kidney disease stage 5, requiring dialysis  -- End stage renal disease  -- Other - I will add my own diagnosis  -- Disagree - Not applicable / Not valid  -- Disagree - Clinically Unable to determine / Unknown        PROVIDER RESPONSE TEXT:    The patient has chronic kidney disease stage 3.       Electronically signed by:  Ru Miguel MD 3/11/2021 5:51 PM

## 2021-03-11 NOTE — PROGRESS NOTES
Problem: Mobility Impaired (Adult and Pediatric)  Goal: *Acute Goals and Plan of Care (Insert Text)  Description: FUNCTIONAL STATUS PRIOR TO ADMISSION: Patient was ambulatory with rollator at home and in community, but had had a few falls stepping up on curb in parking lot. HOME SUPPORT PRIOR TO ADMISSION: Patient lives with his girlfriend, was indep with ADLs    Physical Therapy Goals  Initiated 3/8/2021  1. Patient will move from supine to sit and sit to supine  in bed with minimal assistance/contact guard assist within 7 day(s). 2.  Patient will transfer from bed to chair and chair to bed with minimal assistance/contact guard assist using the least restrictive device within 7 day(s). 3.  Patient will perform sit to stand with minimal assistance/contact guard assist within 7 day(s). 4.  Patient will ambulate with minimal assistance/contact guard assist for 100 feet with the least restrictive device within 7 day(s). Outcome: Progressing Towards Goal     PHYSICAL THERAPY TREATMENT  Patient: Denisha Del Toro (21 y.o. male)  Date: 3/11/2021    Diagnosis: Hip fracture ([facility] [S72.009A]        Seizure ([facility] [R56.9]        LUCY (acute kidney injury) ([facility] [N17.9]        Hyperkalemia [E87.5]     Procedure(s) (LRB): RIGHT FEMUR INSERTION INTRA MEDULLARY NAIL (URGENT) (Right) 4 Days Post-Op    Precautions:  FALLS!!    Chart, physical therapy assessment, plan of care and goals were reviewed. ASSESSMENT: Patient continues with skilled PT services and is progressing slowly towards goals, no LOB (still some knee buckling when first on his feet), no SOB, did better with bed mob and transfers, does well with some encouragement, did well with ther-ex, still having pain, vc's for safety and proper RW use. Current Level of Function Impacting Discharge (mobility/balance): mod assist x1         PLAN : Patient continues to benefit from skilled intervention to address the above impairments.   Continue treatment per established plan of care  to address goals. Recommendation for discharge: (in order for the patient to meet his/her long term goals) Therapy up to 5 days/week in SNF setting    This discharge recommendation: Has not yet been discussed the attending provider and/or case management    IF patient discharges home will need the following DME: rolling walker     OBJECTIVE DATA SUMMARY:     Critical Behavior:  Neurologic State: Alert, Confused  Orientation Level: Oriented to person, Oriented to place, Oriented to situation, Disoriented to time  Cognition: Follows commands  Safety/Judgement: Decreased awareness of need for assistance, Decreased awareness of need for safety, Decreased insight into deficits, Fall prevention    Functional Mobility Training:  Bed Mobility:  Rolling: Moderate assistance;Assist x1;Additional time  Supine to Sit: Moderate assistance;Assist x1;Additional time  Scooting: Contact guard assistance;Assist x1;Additional time  Level of Assistance: Moderate assistance  Interventions: Tactile cues; Verbal cues    Transfers:  Sit to Stand: Minimum assistance;Assist x1;Additional time  Stand to Sit: Minimum assistance;Assist x1;Additional time  Bed to Chair: Minimum assistance;Assist x1;Additional time  Interventions: Tactile cues; Verbal cues  Level of Assistance: Minimum assistance;Assist x1;Additional time    Balance:  Sitting: Intact; With support  Sitting - Static: Good (unsupported)  Sitting - Dynamic: Not tested  Standing: Impaired; With support  Standing - Static: Fair;Constant support  Standing - Dynamic : Fair;Constant support    Ambulation/Gait Training:  Distance (ft): 4 Feet (ft)  Assistive Device: Gait belt;Walker, rolling  Ambulation - Level of Assistance: Minimal assistance;Assist x1;Additional time  Gait Abnormalities: Antalgic;Decreased step clearance; Step to gait;Trunk sway increased  Right Side Weight Bearing: As tolerated  Left Side Weight Bearing: Full  Base of Support: Shift to left  Stance: Right decreased  Speed/Georgette: Slow  Step Length: Left shortened;Right shortened    Therapeutic Exercises:   sitting  EXERCISE   Sets   Reps   Active Active Assist   Passive   Comments   Ankle pumps 1 10 [x] [] [] bilat   Heel raises 1 10 [x] [] [] \"   Toe tap 1 10 [x] [] [] \"   Knee ext 1 10 [x] [] [] \"   Hip flex 1 10 [x] [] [] \"     Pain Ratin    Activity Tolerance: Poor    After treatment patient left in no apparent distress: Sitting in chair, Call bell within reach, and Caregiver / family present    COMMUNICATION/COLLABORATION:   The patients plan of care was discussed with: Registered nurse.      Dontae Diego PTA   Time Calculation: 30 mins
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
## 2021-03-11 NOTE — PROGRESS NOTES
Nephrology Progress Note  Johnnie Milian     www. [facility]  Phone - [phone]   Patient: Buck Yanez    YOB: 1932        Date- 3/11/2021   Admit Date: 3/5/2021  CC: Follow up for anthony on CKD          IMPRESSION & PLAN:    Acute kidney injury on CKD    CKD sec to HTN and DM2, suspect  B/L Cr at 2.4 range.  Hyperkalemia- s/p kayexalate and veltassa   DM2   Anemia    seizures   hyponatremia   HTN   Hip fracture -right hip- s/p ORIF- 3/7   Anemia    PLAN-   Continue current care   Fluid restriction 1500 ml per day   Give venofer and epogen   He will need out patient follow up after discharge.  D/w pt and family     Subjective: Interval History:   -  Seen this am  Cr stable   c/o sob with activity  No c/o chest pain,   No c/o nausea or vomiting, No c/o  fever. Objective:   Vitals:    03/10/21 1538 03/10/21 1928 03/11/21 0330 03/11/21 0725   BP: (!) 152/77 134/76 135/73 123/63   Pulse: 99 100 82 75   Resp: 15 18 18 16   Temp: 99.7 °F (37.6 °C) 98.5 °F (36.9 °C) 98.9 °F (37.2 °C) 98.5 °F (36.9 °C)   SpO2: 100% 100% 100% 98%   Weight:       Height:          03/10 0701 - 03/11 0700  In: 550 [I.V.:550]  Out: -   Last 3 Recorded Weights in this Encounter    03/06/21 1529 03/07/21 0312 03/08/21 0307   Weight: 76.7 kg (169 lb 1.5 oz) 74.9 kg (165 lb 3.2 oz) 76.4 kg (168 lb 7 oz)      Physical exam:   GEN: nad  NECK- Supple, no mass  RESP: No wheezing, Clear b/l, no rales  CVS: S1,S2  RRR  NEURO: Normal speech, Non focal  EXT: No Edema       Chart reviewed. Pertinent Notes reviewed.      Data Review :  Recent Labs     03/11/21  0240 03/10/21  0336 03/09/21  0326   * 134* 136   K 4.6 4.7 4.3    103 106   CO2 26 25 22   BUN 34* 31* 31*   CREA 2.35* 2.34* 2.69*   * 172* 157*   CA 8.3* 8.7 7.9*     Recent Labs     03/11/21  0240 03/10/21  0336 03/09/21  1657 03/09/21  0326   WBC 6.9 6.6  --  6.9   HGB 7.8* 8.7* 7.4* 6.2*   HCT 23.9* 25.6* 21.7* 19.2*    141*  --  109*     No results for input(s): FE, TIBC, PSAT, FERR in the last 72 hours.    Medication list  reviewed  Current Facility-Administered Medications   Medication Dose Route Frequency    cefepime (MAXIPIME) 1 g in 0.9% sodium chloride (MBP/ADV) 50 mL MBP  1 g IntraVENous Q12H    vancomycin (VANCOCIN) 750 mg in 0.9% sodium chloride 250 mL (VIAL-MATE)  750 mg IntraVENous Q24H    oxyCODONE IR (ROXICODONE) tablet 5 mg  5 mg Oral Q4H PRN    0.9% sodium chloride infusion 250 mL  250 mL IntraVENous PRN    alogliptin (NESINA) tablet 6.25 mg  6.25 mg Oral DAILY    insulin glargine (LANTUS) injection 5 Units  5 Units SubCUTAneous QHS    artificial tears (dextran-hypromellose-glycerin) (GENTEAL) ophthalmic solution 1 Drop  1 Drop Both Eyes QID PRN    alcohol 62% (NOZIN) nasal  1 Ampule  1 Ampule Topical Q12H    sodium chloride (NS) flush 5-40 mL  5-40 mL IntraVENous Q8H    sodium chloride (NS) flush 5-40 mL  5-40 mL IntraVENous PRN    acetaminophen (TYLENOL) tablet 650 mg  650 mg Oral Q6H    naloxone (NARCAN) injection 0.4 mg  0.4 mg IntraVENous PRN    ondansetron (ZOFRAN ODT) tablet 4 mg  4 mg Oral Q4H PRN    calcium-vitamin D (OS-EDUARDO +D3) 500 mg-200 unit per tablet 1 Tab  1 Tab Oral TID WITH MEALS    senna-docusate (PERICOLACE) 8.6-50 mg per tablet 1 Tab  1 Tab Oral BID    polyethylene glycol (MIRALAX) packet 17 g  17 g Oral DAILY    bisacodyL (DULCOLAX) suppository 10 mg  10 mg Rectal DAILY PRN    heparin (porcine) injection 5,000 Units  5,000 Units SubCUTAneous Q8H    oxyCODONE IR (ROXICODONE) tablet 2.5 mg  2.5 mg Oral Q4H PRN    allopurinoL (ZYLOPRIM) tablet 100 mg  100 mg Oral DAILY    pregabalin (LYRICA) capsule 50 mg  50 mg Oral DAILY    traZODone (DESYREL) tablet 50 mg  50 mg Oral QHS PRN    latanoprost (XALATAN) 0.005 % ophthalmic solution 1 Drop  1 Drop Both Eyes QHS    cyanocobalamin (VITAMIN B12) tablet 1,000 mcg  1,000 mcg Oral DAILY    acetaminophen (TYLENOL) tablet 650 mg  650 mg Oral Q6H PRN    Or   • acetaminophen (TYLENOL) suppository 650 mg  650 mg Rectal Q6H PRN   • polyethylene glycol (MIRALAX) packet 17 g  17 g Oral DAILY PRN   • promethazine (PHENERGAN) tablet 12.5 mg  12.5 mg Oral Q6H PRN   • insulin lispro (HUMALOG) injection   SubCUTAneous AC&HS   • glucose chewable tablet 16 g  4 Tab Oral PRN   • dextrose (D50W) injection syrg 12.5-25 g  12.5-25 g IntraVENous PRN   • glucagon (GLUCAGEN) injection 1 mg  1 mg IntraMUSCular PRN          Demetrius Spears MD              [facility]  [facility] [place] Office  [address], [address]  [place] [address]  Phone - [phone]               Fax - [phone]

## 2021-03-11 NOTE — PROGRESS NOTES
Pt complaining of pain. Received PRN oxy at 18:32 and pain is unrelieved.  Paged MD.    8:27 PM Received call back and orders from [address] [place] [address].

## 2021-03-11 NOTE — PROGRESS NOTES
Bedside and Verbal shift change report given to Ryder Montalvo (oncoming nurse) by Adilson Shine (offgoing nurse). Report included the following information SBAR, Kardex, OR Summary, Procedure Summary, Intake/Output, MAR, Recent Results and Med Rec Status.

## 2021-03-12 VITALS
WEIGHT: 168.44 LBS | DIASTOLIC BLOOD PRESSURE: 68 MMHG | OXYGEN SATURATION: 100 % | HEIGHT: 66 IN | RESPIRATION RATE: 18 BRPM | BODY MASS INDEX: 27.07 KG/M2 | SYSTOLIC BLOOD PRESSURE: 141 MMHG | TEMPERATURE: 98.9 F | HEART RATE: 98 BPM

## 2021-03-12 LAB
ANION GAP SERPL CALC-SCNC: 3 MMOL/L (ref 5–15)
BASOPHILS # BLD: 0 K/UL (ref 0–0.1)
BASOPHILS NFR BLD: 0 % (ref 0–1)
BUN SERPL-MCNC: 38 MG/DL (ref 6–20)
BUN/CREAT SERPL: 16 (ref 12–20)
CALCIUM SERPL-MCNC: 8.4 MG/DL (ref 8.5–10.1)
CHLORIDE SERPL-SCNC: 102 MMOL/L (ref 97–108)
CO2 SERPL-SCNC: 27 MMOL/L (ref 21–32)
CREAT SERPL-MCNC: 2.4 MG/DL (ref 0.7–1.3)
DIFFERENTIAL METHOD BLD: ABNORMAL
EOSINOPHIL # BLD: 0.2 K/UL (ref 0–0.4)
EOSINOPHIL NFR BLD: 4 % (ref 0–7)
ERYTHROCYTE [DISTWIDTH] IN BLOOD BY AUTOMATED COUNT: 13.6 % (ref 11.5–14.5)
GLUCOSE BLD STRIP.AUTO-MCNC: 195 MG/DL (ref 65–100)
GLUCOSE SERPL-MCNC: 146 MG/DL (ref 65–100)
HCT VFR BLD AUTO: 23.2 % (ref 36.6–50.3)
HGB BLD-MCNC: 7.6 G/DL (ref 12.1–17)
IMM GRANULOCYTES # BLD AUTO: 0 K/UL (ref 0–0.04)
IMM GRANULOCYTES NFR BLD AUTO: 1 % (ref 0–0.5)
LYMPHOCYTES # BLD: 1.4 K/UL (ref 0.8–3.5)
LYMPHOCYTES NFR BLD: 24 % (ref 12–49)
MCH RBC QN AUTO: 30.3 PG (ref 26–34)
MCHC RBC AUTO-ENTMCNC: 32.8 G/DL (ref 30–36.5)
MCV RBC AUTO: 92.4 FL (ref 80–99)
MONOCYTES # BLD: 0.6 K/UL (ref 0–1)
MONOCYTES NFR BLD: 11 % (ref 5–13)
NEUTS SEG # BLD: 3.4 K/UL (ref 1.8–8)
NEUTS SEG NFR BLD: 60 % (ref 32–75)
NRBC # BLD: 0 K/UL (ref 0–0.01)
NRBC BLD-RTO: 0 PER 100 WBC
PLATELET # BLD AUTO: 165 K/UL (ref 150–400)
PMV BLD AUTO: 9.8 FL (ref 8.9–12.9)
POTASSIUM SERPL-SCNC: 4.7 MMOL/L (ref 3.5–5.1)
PROCALCITONIN SERPL-MCNC: 0.2 NG/ML
RBC # BLD AUTO: 2.51 M/UL (ref 4.1–5.7)
SERVICE CMNT-IMP: ABNORMAL
SODIUM SERPL-SCNC: 132 MMOL/L (ref 136–145)
WBC # BLD AUTO: 5.7 K/UL (ref 4.1–11.1)

## 2021-03-12 PROCEDURE — 97116 GAIT TRAINING THERAPY: CPT

## 2021-03-12 PROCEDURE — 97110 THERAPEUTIC EXERCISES: CPT

## 2021-03-12 PROCEDURE — 74011250636 HC RX REV CODE- 250/636: Performed by: ORTHOPAEDIC SURGERY

## 2021-03-12 PROCEDURE — 74011250637 HC RX REV CODE- 250/637: Performed by: ORTHOPAEDIC SURGERY

## 2021-03-12 PROCEDURE — 80048 BASIC METABOLIC PNL TOTAL CA: CPT

## 2021-03-12 PROCEDURE — 82962 GLUCOSE BLOOD TEST: CPT

## 2021-03-12 PROCEDURE — 74011250636 HC RX REV CODE- 250/636: Performed by: HOSPITALIST

## 2021-03-12 PROCEDURE — 74011250637 HC RX REV CODE- 250/637: Performed by: HOSPITALIST

## 2021-03-12 PROCEDURE — 85025 COMPLETE CBC W/AUTO DIFF WBC: CPT

## 2021-03-12 PROCEDURE — 74011000250 HC RX REV CODE- 250: Performed by: ORTHOPAEDIC SURGERY

## 2021-03-12 PROCEDURE — 84145 PROCALCITONIN (PCT): CPT

## 2021-03-12 PROCEDURE — 74011000258 HC RX REV CODE- 258: Performed by: HOSPITALIST

## 2021-03-12 PROCEDURE — 97530 THERAPEUTIC ACTIVITIES: CPT

## 2021-03-12 PROCEDURE — 74011636637 HC RX REV CODE- 636/637: Performed by: ORTHOPAEDIC SURGERY

## 2021-03-12 PROCEDURE — 36415 COLL VENOUS BLD VENIPUNCTURE: CPT

## 2021-03-12 RX ORDER — TRAMADOL HYDROCHLORIDE 50 MG/1
50 TABLET ORAL
COMMUNITY

## 2021-03-12 RX ORDER — ACETAMINOPHEN 325 MG/1
650 TABLET ORAL EVERY 6 HOURS
Qty: 72 TAB | Refills: 0 | Status: SHIPPED
Start: 2021-03-12 | End: 2021-03-21

## 2021-03-12 RX ORDER — POLYETHYLENE GLYCOL 3350 17 G/17G
17 POWDER, FOR SOLUTION ORAL DAILY
Qty: 30 PACKET | Refills: 0 | Status: SHIPPED
Start: 2021-03-13 | End: 2021-04-12

## 2021-03-12 RX ORDER — INSULIN GLARGINE 100 [IU]/ML
14 INJECTION, SOLUTION SUBCUTANEOUS DAILY
COMMUNITY

## 2021-03-12 RX ORDER — FLUOXETINE HYDROCHLORIDE 20 MG/1
20 CAPSULE ORAL DAILY
COMMUNITY

## 2021-03-12 RX ORDER — HEPARIN SODIUM 5000 [USP'U]/ML
5000 INJECTION, SOLUTION INTRAVENOUS; SUBCUTANEOUS EVERY 12 HOURS
Qty: 42 ML | Refills: 0 | Status: SHIPPED
Start: 2021-03-12 | End: 2021-04-02

## 2021-03-12 RX ORDER — DOCUSATE SODIUM 100 MG/1
100 CAPSULE, LIQUID FILLED ORAL DAILY
COMMUNITY

## 2021-03-12 RX ORDER — BACLOFEN 20 MG
500 TABLET ORAL DAILY
COMMUNITY

## 2021-03-12 RX ORDER — AMOXICILLIN 250 MG
1 CAPSULE ORAL 2 TIMES DAILY
Qty: 60 TAB | Refills: 0 | Status: SHIPPED
Start: 2021-03-12 | End: 2021-04-11

## 2021-03-12 RX ORDER — FERROUS SULFATE 325(65) MG
325 TABLET, DELAYED RELEASE (ENTERIC COATED) ORAL 2 TIMES DAILY WITH MEALS
COMMUNITY

## 2021-03-12 RX ORDER — DOXYCYCLINE 100 MG/1
100 CAPSULE ORAL 2 TIMES DAILY
Qty: 14 CAP | Refills: 0 | Status: SHIPPED
Start: 2021-03-12 | End: 2021-03-19

## 2021-03-12 RX ORDER — LEVOFLOXACIN 750 MG/1
750 TABLET ORAL DAILY
Qty: 7 TAB | Refills: 0 | Status: SHIPPED
Start: 2021-03-12 | End: 2021-03-19

## 2021-03-12 RX ORDER — INSULIN LISPRO 100 [IU]/ML
INJECTION, SOLUTION INTRAVENOUS; SUBCUTANEOUS
Qty: 1 VIAL | Refills: 0 | Status: SHIPPED
Start: 2021-03-12

## 2021-03-12 RX ORDER — FERROUS SULFATE, DRIED 160(50) MG
1 TABLET, EXTENDED RELEASE ORAL
Qty: 90 TAB | Refills: 1 | Status: SHIPPED
Start: 2021-03-12 | End: 2021-05-11

## 2021-03-12 RX ORDER — ESOMEPRAZOLE MAGNESIUM 40 MG/1
40 CAPSULE, DELAYED RELEASE ORAL DAILY
COMMUNITY

## 2021-03-12 RX ORDER — OXYCODONE HYDROCHLORIDE 5 MG/1
5 TABLET ORAL
Qty: 30 TAB | Refills: 0 | Status: SHIPPED | OUTPATIENT
Start: 2021-03-12 | End: 2021-03-26

## 2021-03-12 RX ADMIN — OXYCODONE 2.5 MG: 5 TABLET ORAL at 08:55

## 2021-03-12 RX ADMIN — Medication 1 AMPULE: at 08:22

## 2021-03-12 RX ADMIN — LATANOPROST 1 DROP: 50 SOLUTION OPHTHALMIC at 00:46

## 2021-03-12 RX ADMIN — PREGABALIN 50 MG: 25 CAPSULE ORAL at 08:18

## 2021-03-12 RX ADMIN — DOCUSATE SODIUM 50MG AND SENNOSIDES 8.6MG 1 TABLET: 8.6; 5 TABLET, FILM COATED ORAL at 08:18

## 2021-03-12 RX ADMIN — CYANOCOBALAMIN TAB 500 MCG 1000 MCG: 500 TAB at 08:18

## 2021-03-12 RX ADMIN — Medication 1 TABLET: at 08:18

## 2021-03-12 RX ADMIN — ALLOPURINOL 100 MG: 100 TABLET ORAL at 08:18

## 2021-03-12 RX ADMIN — HEPARIN SODIUM 5000 UNITS: 5000 INJECTION INTRAVENOUS; SUBCUTANEOUS at 08:57

## 2021-03-12 RX ADMIN — CEFEPIME HYDROCHLORIDE 1 G: 1 INJECTION, POWDER, FOR SOLUTION INTRAMUSCULAR; INTRAVENOUS at 08:33

## 2021-03-12 RX ADMIN — HEPARIN SODIUM 5000 UNITS: 5000 INJECTION INTRAVENOUS; SUBCUTANEOUS at 00:46

## 2021-03-12 RX ADMIN — POLYETHYLENE GLYCOL 3350 17 G: 17 POWDER, FOR SOLUTION ORAL at 08:18

## 2021-03-12 RX ADMIN — ALOGLIPTIN 6.25 MG: 6.25 TABLET, FILM COATED ORAL at 08:18

## 2021-03-12 RX ADMIN — INSULIN LISPRO 2 UNITS: 100 INJECTION, SOLUTION INTRAVENOUS; SUBCUTANEOUS at 08:18

## 2021-03-12 NOTE — PROGRESS NOTES
End of Shift Note    Bedside shift change report given to Ryder Montalvo (oncoming nurse) by Rosa Ag RN (offgoing nurse). Report included the following information SBAR, Kardex, OR Summary, Procedure Summary, Intake/Output, MAR, Recent Results and Med Rec Status    Shift worked:  7p-7a     Shift summary and any significant changes:          Concerns for physician to address:       Zone phone for oncoming shift:   [phone]       Activity:  Activity Level: Up with Assistance  Number times ambulated in hallways past shift: 0  Number of times OOB to chair past shift: 0    Cardiac:   Cardiac Monitoring: No      Cardiac Rhythm: Normal sinus rhythm    Access:   Current line(s): PIV     Genitourinary:   Urinary status: incontinent    Respiratory:   O2 Device: Room air  Chronic home O2 use?: NO  Incentive spirometer at bedside: NO  Actual Volume (ml): 1500 ml  GI:  Last Bowel Movement Date: 03/09/21  Current diet:  DIET DIABETIC CONSISTENT CARB Mechanical Soft  DIET NUTRITIONAL SUPPLEMENTS Breakfast, Lunch, Dinner; Glucerna Shake  Passing flatus: YES  Tolerating current diet: YES  % Diet Eaten: 100 %    Pain Management:   Patient states pain is manageable on current regimen: YES    Skin:  Aguila Score: 15  Interventions: float heels, increase time out of bed and PT/OT consult    Patient Safety:  Fall Score:  Total Score: 5  Interventions: assistive device (walker, cane, etc), gripper socks and pt to call before getting OOB  High Fall Risk: Yes    Length of Stay:  Expected LOS: 4d 7h  Actual LOS: 7      Lilly Meza RN

## 2021-03-12 NOTE — PROGRESS NOTES
Nephrology Progress Note  Johnnie Milian     www. [facility]  Phone - [phone]   Patient: Uyen Washington    YOB: 1932        Date- 3/12/2021   Admit Date: 3/5/2021  CC: Follow up for LUCY i on CKD          IMPRESSION & PLAN:    LUCY  on CKD    CKD sec to HTN and DM2, suspect  B/L Cr at 2.4 range.  Hyperkalemia- s/p kayexalate and veltassa   DM2   Anemia    seizures   hyponatremia   HTN   Hip fracture -right hip- s/p ORIF- 3/7   Anemia    PLAN-   Continue current care   Fluid restriction 1500 ml per day   S/P venofer and epogen   D/w pt      Subjective: Interval History:   -  Seen this am  Cr stable  NA Stable at 132  C/o leg pain  No c/o chest pain,   No c/o nausea or vomiting, No c/o  fever. Objective:   Vitals:    03/11/21 1547 03/11/21 1918 03/12/21 0046 03/12/21 0734   BP: 121/61 119/65 120/70 (!) 141/68   Pulse: 67 88 80 98   Resp: 18 18 18 18   Temp: 98.2 °F (36.8 °C) 98.8 °F (37.1 °C) 98.7 °F (37.1 °C) 98.9 °F (37.2 °C)   SpO2: 98% 95% 98% 100%   Weight:       Height:          03/11 0701 - 03/12 0700  In: 350 [I.V.:350]  Out: -   Last 3 Recorded Weights in this Encounter    03/06/21 1529 03/07/21 0312 03/08/21 0307   Weight: 76.7 kg (169 lb 1.5 oz) 74.9 kg (165 lb 3.2 oz) 76.4 kg (168 lb 7 oz)      Physical exam:   GEN: NAD  NECK- Supple, no mass  RESP: No wheezing, clear b/l  CVS: S1,S2  RRR  NEURO: Normal speech, non focal  EXT: No Edema       Chart reviewed. Pertinent Notes reviewed. Data Review :  Recent Labs     03/12/21  0301 03/11/21  0240 03/10/21  0336   * 132* 134*   K 4.7 4.6 4.7    100 103   CO2 27 26 25   BUN 38* 34* 31*   CREA 2.40* 2.35* 2.34*   * 153* 172*   CA 8.4* 8.3* 8.7     Recent Labs     03/12/21  0301 03/11/21  0240 03/10/21  0336   WBC 5.7 6.9 6.6   HGB 7.6* 7.8* 8.7*   HCT 23.2* 23.9* 25.6*    157 141*     No results for input(s): FE, TIBC, PSAT, FERR in the last 72 hours. Medication list  reviewed  Current Facility-Administered Medications   Medication Dose Route Frequency    epoetin maile-epbx (RETACRIT) injection 20,000 Units  20,000 Units SubCUTAneous Q TUE, THU & SAT    Vancomycin trough 15:00 3/12   1 Each Other ONCE    cefepime (MAXIPIME) 1 g in 0.9% sodium chloride (MBP/ADV) 50 mL MBP  1 g IntraVENous Q12H    vancomycin (VANCOCIN) 750 mg in 0.9% sodium chloride 250 mL (VIAL-MATE)  750 mg IntraVENous Q24H    oxyCODONE IR (ROXICODONE) tablet 5 mg  5 mg Oral Q4H PRN    0.9% sodium chloride infusion 250 mL  250 mL IntraVENous PRN    alogliptin (NESINA) tablet 6.25 mg  6.25 mg Oral DAILY    insulin glargine (LANTUS) injection 5 Units  5 Units SubCUTAneous QHS    artificial tears (dextran-hypromellose-glycerin) (GENTEAL) ophthalmic solution 1 Drop  1 Drop Both Eyes QID PRN    alcohol 62% (NOZIN) nasal  1 Ampule  1 Ampule Topical Q12H    sodium chloride (NS) flush 5-40 mL  5-40 mL IntraVENous Q8H    sodium chloride (NS) flush 5-40 mL  5-40 mL IntraVENous PRN    acetaminophen (TYLENOL) tablet 650 mg  650 mg Oral Q6H    naloxone (NARCAN) injection 0.4 mg  0.4 mg IntraVENous PRN    ondansetron (ZOFRAN ODT) tablet 4 mg  4 mg Oral Q4H PRN    calcium-vitamin D (OS-EDUARDO +D3) 500 mg-200 unit per tablet 1 Tab  1 Tab Oral TID WITH MEALS    senna-docusate (PERICOLACE) 8.6-50 mg per tablet 1 Tab  1 Tab Oral BID    polyethylene glycol (MIRALAX) packet 17 g  17 g Oral DAILY    bisacodyL (DULCOLAX) suppository 10 mg  10 mg Rectal DAILY PRN    heparin (porcine) injection 5,000 Units  5,000 Units SubCUTAneous Q8H    oxyCODONE IR (ROXICODONE) tablet 2.5 mg  2.5 mg Oral Q4H PRN    allopurinoL (ZYLOPRIM) tablet 100 mg  100 mg Oral DAILY    pregabalin (LYRICA) capsule 50 mg  50 mg Oral DAILY    traZODone (DESYREL) tablet 50 mg  50 mg Oral QHS PRN    latanoprost (XALATAN) 0.005 % ophthalmic solution 1 Drop  1 Drop Both Eyes QHS    cyanocobalamin (VITAMIN B12) tablet 1,000 mcg  1,000 mcg Oral DAILY    acetaminophen (TYLENOL) tablet 650 mg  650 mg Oral Q6H PRN    Or    acetaminophen (TYLENOL) suppository 650 mg  650 mg Rectal Q6H PRN    polyethylene glycol (MIRALAX) packet 17 g  17 g Oral DAILY PRN    promethazine (PHENERGAN) tablet 12.5 mg  12.5 mg Oral Q6H PRN    insulin lispro (HUMALOG) injection   SubCUTAneous AC&HS    glucose chewable tablet 16 g  4 Tab Oral PRN    dextrose (D50W) injection syrg 12.5-25 g  12.5-25 g IntraVENous PRN    glucagon (GLUCAGEN) injection 1 mg  1 mg IntraMUSCular PRN          Doris Madden MD              [facility]  Formerly [facility] [address]  [place], [address]  Phone - [phone]               Fax - [phone]

## 2021-03-12 NOTE — DISCHARGE INSTRUCTIONS
Discharge Instructions: How to [address] [place]  Surgery: Hip Fracture Repair  Surgeon:   Cristian Quijano DO  Surgery Date:  3/7/2021     To relieve pain:   Use ice/gel packs.  Put the ice pack directly over the wound, or anywhere you are hurting or swollen.  To control pain and swelling, keep ice on regularly, especially after physical activity.  The packs should stay cold for 3-4 hours. When it is not cold anymore, rotate with the packs in the freezer.  Elevate your leg. This will also keep swelling down.  Rest for at least 20 minutes between activity or exercises.  To keep track of your pain medications, write down what you take and when you take it.  The last dose of pain medication you got in the hospital was:     Medication    Dose    Date & Time           Choose your medications based on the pain scale below:     To keep your pain under control, take Tylenol every 6 hours for 14 days - even if you feel like you dont need it.  For mild to moderate pain (1-6 on pain scale), take one pain pill every 3-4 hours as needed.  For severe pain (7-10 on pain scale), take two pain pills every 3-4 hours as needed.  To prevent nausea, take your pain medications with food. Pain Scale                 As your pain lessens:     Slowly start taking less pain medication. You may do this by waiting longer between doses or by taking smaller doses.  Stop using the pain medications as soon as you no longer need it, usually in 2-3 weeks.  Heparin twice a day for 21 days. Can transition to Aspirin 325 mg twice a day for an additional two weeks .  To prevent stomach upset or bleeding:   Take Pepcid 20 mg twice a day, or a similar home medication, while you are taking a blood thinner.    Do not take non-steroidal anti-inflammatory (NSAID) medications (Ibuprofen, Advil, Motrin, Naproxen, etc.)                                                 OPSITE (Honeycomb dressing)     Keep your clear, waterproof dressing in place for 5-7 days after your leave the hospital.      If you are still having drainage, you will need to change your dressing in 5-7 days. You will be given one extra dressing to use at home.  If there is no more drainage from the wound, you may leave it open to the air. OPSITE DRESSING INSTRUCTIONS                 DO NOTs:   Do not rub your surgical wound   Do not put lotion or oils on your wound.  Do not take a tub bath or go swimming until your doctor says it is ok.  You may shower with this dressing over your wound.  After showering pat the dressing dry.  If you have staples a home health nurse will remove them in about 10 days.  To increase and promote healing:     Stop Smoking (or at least cut back on       Smoking).  Eat a well-balanced diet (high in protein       and vitamin C).  If you have a poor appetite, drink Ensure, Glucerna, or CarnationInstant Breakfast for the next 30 days.  If you are diabetic, you should control your blood         sugars to prevent infection and help your wound         to heal.       To prevent constipation, stay active & drink plenty of fluid.  While using pain medications, you should also take stool softeners and laxatives, such as Pericolace and Miralax.  If you are having too many bowel movements, then you may need  to stop taking the laxatives.  You should have a bowel movement 3-4 days after surgery and then at least every other day while on pain medication.  To improve your recovery, you must be active!  WEIGHT BEARING   As Tolerated = You can put as much weight on your leg as you can tolerate while walking.          THERAPY   If you go to a rehab facility, physical therapy (PT) will need to work with you daily, and sometimes twice a day to teach you how to:     Get in and out of the bed   Walk (gait training) and climb stairs   Do exercises and gain strength   Use a walker, crutches or cane     You may also need to have occupational therapy (OT) work with you to help you practice:     Getting on and off the toilet   Self-care (brushing teeth, eating, bathing, etc)     If you go directly home, home health physical therapy will come the day after you leave the hospital.  They will visit about 4 times over the next couple of weeks to teach you how to get out of bed, to safely walk in your home, and to do your exercises.  NO DRIVING until your surgeon tells you it is ok.  You can return to work when cleared by a physician.  Please call your physician immediately if you have:     Constant bleeding from your wound.  Increasing redness or swelling around your wound (Some warmth, bruising and swelling is normal).  Change in wound drainage (increase in amount, color, or bad smell).  Change in mental status (unusual behavior)     Temperature over 101.5 degrees Fahrenheit     Increased pain, swelling, or redness in the calf (back of your lower leg), thigh, ankle or foot.  Emergency: CALL 911 if you have:   Shortness of breath   Chest pain when you cough or taking a deep breath     Please call your surgeons office at [address] [place] [address] for a follow up appointment.  You should call as soon as you get home or the next day after discharge. Ask to make an appointment in 2 weeks.                            HOSPITALIST DISCHARGE INSTRUCTIONS    NAME: Talat Woodall   :  10/6/1932   MRN:  [id number]     Date/Time:  3/12/2021 9:11 AM    ADMIT DATE: 3/5/2021   DISCHARGE DATE: 3/12/2021       Medications: Per above medication reconciliation / see MAR    Pain Management: per above medications    Recommended diet: Diabetic Diet    Recommended activity: PT/OT Eval and Treat    Wound care: See surgical/procedure care instructions    Indwelling devices:  None    Supplemental Oxygen: None    Required Lab work: Weekly BMP to follow Cr. Glucose management:  Accucheck ACHS with sliding scale per SNF protocol    Code status: Full        Outside physician follow up: Follow-up Information     Follow up With Specialties Details Why [address] [facility] [address]  [phone]    Briseidasaleem Sands, DO Orthopedic Surgery In 2 weeks For Follow up after surgery [address] [place] [address] Drew Peña MD Internal Medicine   [facility] [address]  [phone]                 Skilled nursing facility/ SNF MD responsible for above on discharge. Information obtained by :  I understand that if any problems occur once I am at home I am to contact my physician. I understand and acknowledge receipt of the instructions indicated above.                                                                                                                                            Physician's or R.N.'s Signature                                                                  Date/Time                                                                                                                                              Patient or Repres

## 2021-03-12 NOTE — PROGRESS NOTES
Transition of Care Plan:  Disposition: SNF- [facility]  Follow up appointments: PCP, Jose Rose  DME needed: n/a  Transportation at Discharge: BLS via [facility] - transport set for 11:00 am  Keys or means to access home:  N/A    IM Medicare letter: Received on 3/12/21  Caregiver Contact: Daughter, Kory Alanis ([phone]) and Kel Quesada ([phone])  Discharge Caregiver contacted prior to discharge?  Yes    MD cleared pt for d/c. CM contacted Analia at [facility] and confirmed they had a bed available for pt this morning. CM contacted pt's grandson Alberta Arnold to discuss d/c plan. Pt is going to [facility] for SNF placement. BLS transport is set for 11:00 am via [facility]. Nurse to call report to [facility] at [phone] ask for [place] going to room 45A. Fer agrees with plan above and provided verbal consent for 2nd IM. Medicare pt has received, reviewed, and signed 2nd IM letter informing them of their right to appeal the discharge. Signed copy has been placed on pt bedside chart. No further needs identified at this time. CM updated the facility with transport time. Patient is ready to d/c on a CM standpoint. RN aware. Care Management Interventions  PCP Verified by CM: Yes  Palliative Care Criteria Met (RRAT>21 & CHF Dx)?: No  Mode of Transport at Discharge: BLS(BL[facility] via [facility])  Transition of Care Consult (CM Consult): Discharge Planning(SNF - [facility])  Discharge Durable Medical Equipment: No  Health Maintenance Reviewed: Yes  Physical Therapy Consult: Yes  Occupational Therapy Consult: Yes  Speech Therapy Consult: No  Current Support Network:  Other(Pt lives with his gf. Pt's grandson and children are supportive)  Confirm Follow Up Transport: Family  The Plan for Transition of Care is Related to the Following Treatment Goals : Rehab vs. HH pending PT/OT recommendations  The Patient and/or Patient Representative was Provided with a Choice of Provider and Agrees with the Discharge Plan?: Yes  Name of the Patient Representative Who was Provided with a Choice of Provider and Agrees with the Discharge Plan: Mando hurst  Freedom of Choice List was Provided with Basic Dialogue that Supports the Patient's Individualized Plan of Care/Goals, Treatment Preferences and Shares the Quality Data Associated with the Providers?: Yes  Discharge Location  Discharge Placement: Skilled nursing facility     CAS Nguyen  Care Manager [facility]  [phone]

## 2021-03-12 NOTE — PROGRESS NOTES
Patient transported with [facility]. All belongings removed from the room. Hospital to SNF note placed in folder to go to facility with patient.  Discharge paperwork prepared and sent with patient by charge nurse, Jose Bass RN
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
## 2021-03-12 NOTE — PROGRESS NOTES
Problem: Falls - Risk of  Goal: *Absence of Falls  Description: Document Suhail Ramos Fall Risk and appropriate interventions in the flowsheet. Outcome: Resolved/Met     Problem: Patient Education: Go to Patient Education Activity  Goal: Patient/Family Education  Outcome: Resolved/Met     Problem: Infection - Risk of, Urinary Catheter-Associated Urinary Tract Infection  Goal: *Absence of infection signs and symptoms  Outcome: Resolved/Met     Problem: Patient Education: Go to Patient Education Activity  Goal: Patient/Family Education  Outcome: Resolved/Met     Problem: Pressure Injury - Risk of  Goal: *Prevention of pressure injury  Description: Document Aguila Scale and appropriate interventions in the flowsheet. Outcome: Resolved/Met     Problem: Patient Education: Go to Patient Education Activity  Goal: Patient/Family Education  Outcome: Resolved/Met     Problem: Patient Education: Go to Patient Education Activity  Goal: Patient/Family Education  Outcome: Resolved/Met     Problem: Patient Education: Go to Patient Education Activity  Goal: Patient/Family Education  Outcome: Resolved/Met     Problem: Diabetes Self-Management  Goal: *Disease process and treatment process  Description: Define diabetes and identify own type of diabetes; list 3 options for treating diabetes. Outcome: Resolved/Met  Goal: *Incorporating nutritional management into lifestyle  Description: Describe effect of type, amount and timing of food on blood glucose; list 3 methods for planning meals. Outcome: Resolved/Met  Goal: *Incorporating physical activity into lifestyle  Description: State effect of exercise on blood glucose levels. Outcome: Resolved/Met  Goal: *Developing strategies to promote health/change behavior  Description: Define the ABC's of diabetes; identify appropriate screenings, schedule and personal plan for screenings.   Outcome: Resolved/Met  Goal: *Using medications safely  Description: State effect of diabetes medications on diabetes; name diabetes medication taking, action and side effects. Outcome: Resolved/Met  Goal: *Monitoring blood glucose, interpreting and using results  Description: Identify recommended blood glucose targets  and personal targets. Outcome: Resolved/Met  Goal: *Prevention, detection, treatment of acute complications  Description: List symptoms of hyper- and hypoglycemia; describe how to treat low blood sugar and actions for lowering  high blood glucose level. Outcome: Resolved/Met  Goal: *Prevention, detection and treatment of chronic complications  Description: Define the natural course of diabetes and describe the relationship of blood glucose levels to long term complications of diabetes.   Outcome: Resolved/Met  Goal: *Developing strategies to address psychosocial issues  Description: Describe feelings about living with diabetes; identify support needed and support network  Outcome: Resolved/Met  Goal: *Insulin pump training  Outcome: Resolved/Met  Goal: *Sick day guidelines  Outcome: Resolved/Met  Goal: *Patient Specific Goal (EDIT GOAL, INSERT TEXT)  Outcome: Resolved/Met     Problem: Patient Education: Go to Patient Education Activity  Goal: Patient/Family Education  Outcome: Resolved/Met

## 2021-03-12 NOTE — PROGRESS NOTES
Transition of Care Plan to SNF/Rehab    SNF/Rehab Transition:  Patient has been accepted to [facility] and meets criteria for admission. Patient will transported by [facility] and expected to leave at 11:00am.    Communication to Patient/Family:  Met with patient and grandson (identified care giver) and they are agreeable to the transition plan. Communication to SNF/Rehab:  Bedside RN, Micheline March, has been notified to update the transition plan to the facility and call report (phone number [phone]). Discharge information has been updated on the AVS.     Discharge instructions to be fax'd to facility at [facility] # [phone]). Nursing Please include all hard scripts for controlled substances, med rec and dc summary, and AVS in packet. Reviewed and confirmed with facility, [facility], can manage the patient care needs for the following:     Job Foots with (X) only those applicable:    Medication:  [x]  Medications will be available at the facility  []  IV Antibiotics   [x]  Controlled Substance - hard copy to be sent with patient   []  Weekly Labs   Documents:  [x] Hard RX  [x] MAR  [x] Kardex  [x] AVS  [x]Transfer Summary  [x]Discharge   Equipment:  []  CPAP/BiPAP  []  Wound Vacuum  []  Alvarez or Urinary Device  []  PICC/Central Line  []  Nebulizer  []  Ventilator   Treatment:  []Isolation (for MRSA, VRE, etc.)  []Surgical Drain Management  []Tracheostomy Care  []Dressing Changes  []Dialysis with transportation and chair time. []PEG Care  []Oxygen  []Daily Weights for Heart Failure   Dietary:  [x]Any diet limitations diabetic diet  []Tube Feedings   []Total Parenteral Management (TPN)   Eligible for Medicaid Long Term Services and Supports  Yes:  [] Eligible for medical assistance or will become eligible within 180 days and UAI completed. [] Provider/Patient and/or support system has requested screening. [] UAI copy provided to patient or responsible party.   [] UAI unavailable at discharge will send once processed to SNF provider. [] UAI unavailable at discharged mailed to patient  No:   [] Private pay and is not financially eligible for Medicaid within the next 180 days. [] Reside out-of-state.   [] A residents of a state owned/operated facility that is licensed  by [address] [facility] or [facility]  [] Enrollment in [facility] hospice services  [] [address] [place] [address]  [] Patient /Family declines to have screening completed or provide financial information for screening     Financial Resources:  Medicaid    [] Initiated and application pending   [] Full coverage     Advanced Care Plan:  []Surrogate Decision Maker of Care  []POA  [x]Communicated Code Status full code   Other     Jeff Calvillo, 5172 hospitals

## 2021-03-12 NOTE — PROGRESS NOTES
Hospital to [address] [place]                                                                        80 y.o.   male    [address]   Room: 3210/01    \[facility]\"" 3 ORTHOPEDICS  Unit Phone# :  [phone]      [address]  \[facility]\"" [address] [address]  Dept: [phone]  Loc: [phone]                    SITUATION     Admitted:  3/5/2021         Attending Provider:  Silas Woods MD       Consultations:  IP CONSULT TO ORTHOPEDIC SURGERY  IP CONSULT TO HOSPITALIST  IP CONSULT TO NEUROLOGY  IP CONSULT TO NEPHROLOGY  IP CONSULT TO CARDIOLOGY    PCP:  Rupesh Pritchett MD   [phone]    Treatment Team: Attending Provider: Silas Woods MD; Consulting Provider: Bel Hi NP; Consulting Provider: René Cooper MD; Consulting Provider: Verdia Snellen, DO; Consulting Provider: Trisha Tolentino MD; Consulting Provider: Spencer Mcbride MD; Consulting Provider: Malvin Butler MD; Utilization Review: Calin Mackey; Care Manager: Jorge Vaz; Care Manager: CAS Abdi; Primary Nurse: Michelle Schumacher    Admitting Dx: Hip fracture ([facility] [S72.009A]  Seizure ([facility] [R56.9]  LUCY (acute kidney injury) ([facility] [N17.9]  Hyperkalemia [E87.5]       Principal Problem: <principal problem not specified>    5 Days Post-Op of   Procedure(s):  RIGHT FEMUR INSERTION INTRA MEDULLARY NAIL (URGENT)   BY: Verdia Snellen, DO             ON: 3/7/2021                  Code Status: Full Code                Advance Directives:   Advance Care Planning 3/7/2021   Confirm Advance Directive None   Patient Would Like to Complete Advance Directive No   Some encounter information is confidential and restricted. Go to Review Flowsheets activity to see all data.     (Send w/patient)   No Doesnt Have       Isolation:  There are currently no Active Isolations       MDRO: No current active infections    Pain Medications given:  Oxycoden    Last dose: 3/12/2021 at  [address] [facility] needed: no  Type of equipment:         BACKGROUND     Allergies: Allergies   Allergen Reactions    Codeine Itching    Fentanyl Other (comments)     Questionable Sz with IV admin       Past Medical History:   Diagnosis Date    Chronic kidney disease     [facility]     Diabetes ([facility]     Gastrointestinal disorder     Glaucoma     Gout     Hypercholesteremia     Hypertension     Other ill-defined conditions(799.89)     gout, glaucoma    Other ill-defined conditions(799.89)     high cholesterol    Seizures (HCC)     Possible seizure 3/6 following administration of fentanyl       Past Surgical History:   Procedure Laterality Date    HX ORTHOPAEDIC      cervical surgery       Medications Prior to Admission   Medication Sig    insulin glargine (Lantus U-100 Insulin) 100 unit/mL injection 14 Units by SubCUTAneous route daily.  traMADoL (ULTRAM) 50 mg tablet Take 50 mg by mouth two (2) times daily as needed for Pain.  esomeprazole (NexIUM) 40 mg capsule Take 40 mg by mouth daily.  ferrous sulfate (IRON) 325 mg (65 mg iron) EC tablet Take 325 mg by mouth two (2) times daily (with meals).  lidocaine 1.8 % ptmd 1 Patch by Apply Externally route daily as needed for Pain.  magnesium oxide 500 mg tab Take 500 mg by mouth daily.  docusate sodium (Colace) 100 mg capsule Take 100 mg by mouth daily.  FLUoxetine (PROzac) 20 mg capsule Take 20 mg by mouth daily.  LYRICA 50 mg capsule TAKE 1 CAPSULE BY MOUTH TWICE DAILY    allopurinol (ZYLOPRIM) 100 mg tablet TAKE 1 TABLET BY MOUTH ONCE DAILY    LUMIGAN 0.01 % ophthalmic drops INSTILL 1 DROP INTO EACH EYE ONCE DAILY AT BEDTIME    cyanocobalamin (VITAMIN B-12) 1,000 mcg tablet Take 1,000 mcg by mouth daily.  traZODone (DESYREL) 50 mg tablet Take 1 Tab by mouth nightly as needed for Sleep.  Indications: insomnia associated with depression    CQuotientE 14 DAY SENSOR kit USE AS DIRECTED       Hard scripts included in transfer packet yes    Vaccinations:    Immunization History   Administered Date(s) Administered    Pneumococcal Polysaccharide (PPSV-23) 07/26/2014       Readmission Risks:    Known Risks: The Charlson CoMorbitiy Index tool is an evidenced based tool that has more automatic generated information. The tool looks at many different items such as the age of the patient, how many times they were admitted in the last calendar year, current length of stay in the hospital and their diagnosis. All of these items are pulled automatically from information documented in the chart from various places and will generate a score that predicts whether a patient is at low (less than 13), medium (13-20) or high (21 or greater) risk of being readmitted.         ASSESSMENT                Temp: 98.9 °F (37.2 °C) (03/12/21 0734) Pulse (Heart Rate): 98 (03/12/21 0734)     Resp Rate: 18 (03/12/21 0734)           BP: (!) 141/68 (03/12/21 0734)     O2 Sat (%): 100 % (03/12/21 0734)     Weight: 76.4 kg (168 lb 7 oz)    Height: 5' 6\" (167.6 cm) (03/10/21 1030)       If above not within 1 hour of discharge:    BP:_____  P:____  R:____ T:_____ O2 Sat: ___%  O2: ______    Active Orders   Diet    DIET DIABETIC CONSISTENT CARB Mechanical Soft         Orientation: only aware of  place, person and situation     Active Behaviors: None                                   Active Lines/Drains:  (Peg Tube / Alvarez / CL or S/L?): no    Urinary Status: Voiding, Diaper     Last BM: Last Bowel Movement Date: 03/09/21     Skin Integrity: Incision (comment), Intact             Mobility: Very limited   Weight Bearing Status: WBAT (Weight Bearing as Tolerated)      Gait Training  Assistive Device: Gait belt, Walker, rolling  Ambulation - Level of Assistance: Minimal assistance, Assist x1, Additional time  Distance (ft): 5 Feet (ft)         Lab Results   Component Value Date/Time    Glucose 146 (H) 03/12/2021 03:01 AM    Hemoglobin A1c 6.7 (H) 03/10/2021 03:36 AM    INR 1.1 03/05/2021 04:50 PM    INR 1.2 (H) 04/07/2012 03:32 PM    HGB 7.6 (L) 03/12/2021 03:01 AM    HGB 7.8 (L) 03/11/2021 02:40 AM        RECOMMENDATION     See After Visit Summary (AVS) for:  · Discharge instructions  · After [address]   · Special equipment needed (entered pre-discharge by Care Management)  · Medication Reconciliation    · Follow up Appointment(s)         Report given/sent by:  Corina Castillo                    Verbal report given to: Bryson Clark LPN  FAXED to:           Estimated discharge time:  3/12/2021 at 1100

## 2021-03-12 NOTE — PROGRESS NOTES
AVS, MAR report, H&P, discharge summary placed in discharge folder for [facility] to take to receiving RN at facility. PIV taken out with cath tip intact and extra dressing placed in pt's belonging bags with personal belongings.

## 2021-03-12 NOTE — PROGRESS NOTES
Problem: Mobility Impaired (Adult and Pediatric)  Goal: *Acute Goals and Plan of Care (Insert Text)  Description: FUNCTIONAL STATUS PRIOR TO ADMISSION: Patient was ambulatory with rollator at home and in community, but had had a few falls stepping up on curb in parking lot. HOME SUPPORT PRIOR TO ADMISSION: Patient lives with his girlfriend, was indep with ADLs    Physical Therapy Goals  Initiated 3/8/2021  1. Patient will move from supine to sit and sit to supine  in bed with minimal assistance/contact guard assist within 7 day(s). 2.  Patient will transfer from bed to chair and chair to bed with minimal assistance/contact guard assist using the least restrictive device within 7 day(s). 3.  Patient will perform sit to stand with minimal assistance/contact guard assist within 7 day(s). 4.  Patient will ambulate with minimal assistance/contact guard assist for 100 feet with the least restrictive device within 7 day(s). Outcome: Progressing Towards Goal   PHYSICAL THERAPY TREATMENT  Patient: Jennifer May (29 y.o. male)  Date: 3/12/2021    Diagnosis: Hip fracture ([facility] [address] [S72.009A]        Seizure ([facility] [address] [facility] [R56.9]        LUCY (acute kidney injury) ([facility] [address] [N17.9]        Hyperkalemia [E87.5]     Procedure(s) (LRB): RIGHT FEMUR INSERTION INTRA MEDULLARY NAIL (URGENT) (Right) 5 Days Post-Op    Precautions: falls    Chart, physical therapy assessment, plan of care and goals were reviewed. ASSESSMENT: Patient continues with skilled PT services and is progressing towards goals, no LOB or SOB, seemed to be in a lot of pain this am, good improvement with bed mob and transfers, good motivation, does well with ther-ex, vc's for safety and proper RW use. Current Level of Function Impacting Discharge (mobility/balance): min assist x1         PLAN : Patient continues to benefit from skilled intervention to address the above impairments.   Continue treatment per established plan of care  to address goals. Recommendation for discharge: (in order for the patient to meet his/her long term goals) Therapy up to 5 days/week in SNF setting    This discharge recommendation: Has been made in collaboration with the attending provider and/or case management    IF patient discharges home will need the following DME: rolling walker     OBJECTIVE DATA SUMMARY:     Critical Behavior:  Neurologic State: Alert, Confused  Orientation Level: Oriented to person, Oriented to place, Disoriented to situation, Disoriented to time  Cognition: Follows commands  Safety/Judgement: Decreased awareness of need for assistance, Decreased awareness of need for safety, Decreased insight into deficits, Fall prevention    Functional Mobility Training:  Bed Mobility:  Supine to Sit: Stand-by assistance; Additional time  Scooting: Stand-by assistance; Additional time  Level of Assistance: Stand-by assistance  Interventions: Verbal cues    Transfers:  Sit to Stand: Contact guard assistance;Assist x1;Additional time  Stand to Sit: Contact guard assistance;Assist x1  Bed to Chair: Minimum assistance;Assist x1;Additional time  Interventions: Tactile cues; Verbal cues  Level of Assistance: Minimum assistance    Balance:  Sitting: Intact; Without support  Sitting - Static: Good (unsupported)  Sitting - Dynamic: Not tested  Standing: Impaired; With support  Standing - Static: Fair;Constant support  Standing - Dynamic : Fair;Constant support    Ambulation/Gait Training:  Distance (ft): 5 Feet (ft)  Assistive Device: Gait belt;Walker, rolling  Ambulation - Level of Assistance: Minimal assistance;Assist x1;Additional time  Gait Abnormalities: Antalgic;Decreased step clearance; Step to gait  Right Side Weight Bearing: As tolerated  Left Side Weight Bearing: Full  Base of Support: Shift to left  Stance: Right decreased  Speed/Georgette: Slow  Step Length: Left shortened;Right shortened    Therapeutic Exercises:   sitting  EXERCISE   Sets   Reps   Active Active Assist   Passive   Comments   Ankle pumps 1 10 [x] [] [] bilat   Heel raises 1 10 [x] [] [] \"   Toe tap 1 10 [x] [] [] \"   Knee ext 1 10 [x] [] [] \"   Hip flex 1 10 [x] [] [] \"     Pain Ratin    Activity Tolerance: Fair    After treatment patient left in no apparent distress: Sitting in chair and Call bell within reach    COMMUNICATION/COLLABORATION:   The patients plan of care was discussed with: Registered nurse.      Phoenix Zamora PTA   Time Calculation: 30 mins

## 2021-03-12 NOTE — PROGRESS NOTES
End of Shift Note    Bedside shift change report given to Lilly (oncoming nurse) by Carlito Lilly (offgoing nurse). Report included the following information SBAR, Kardex, Intake/Output, MAR and Accordion    Shift worked:  [id number]     Shift summary and any significant changes:     occult blood stool sample still needed, patient did not have a BM during shift      Concerns for physician to address:       Zone phone for oncoming shift:   [phone]       Activity:  Activity Level: Up with Assistance  Number times ambulated in hallways past shift: 0  Number of times OOB to chair past shift: 1    Cardiac:   Cardiac Monitoring: No      Cardiac Rhythm: Normal sinus rhythm    Access:   Current line(s): PIV     Genitourinary:   Urinary status: voiding    Respiratory:   O2 Device: Room air  Chronic home O2 use?: NO  Incentive spirometer at bedside: YES  Actual Volume (ml): 1500 ml  GI:  Last Bowel Movement Date: 03/09/21  Current diet:  DIET DIABETIC CONSISTENT CARB Mechanical Soft  DIET NUTRITIONAL SUPPLEMENTS Breakfast, Lunch, Dinner; Glucerna Shake  Passing flatus: YES  Tolerating current diet: YES  % Diet Eaten: 100 %    Pain Management:   Patient states pain is manageable on current regimen: YES    Skin:  Aguila Score: 15  Interventions: increase time out of bed and limit briefs    Patient Safety:  Fall Score:  Total Score: 5  Interventions: bed/chair alarm, assistive device (walker, cane, etc), gripper socks and pt to call before getting OOB  High Fall Risk: Yes    Length of Stay:  Expected LOS: 4d 7h  Actual LOS: [facility]

## 2021-03-12 NOTE — PROGRESS NOTES
Pharmacy Medication Reconciliation     Patient advised me to contact Adali Landry, his grandson, for medications. Adali Landry referred me to the PCP's office, Dr. Gaby Stoner. They faxed a list of the patient's current medications and these were confirmed with [address] Prior to Admission Medications   Prescriptions Last Dose Informant Taking? FLUoxetine (PROzac) 20 mg capsule  Care Giver Yes   Sig: Take 20 mg by mouth daily. FREESTYLE LAVINIA 14 DAY SENSOR kit  Care Giver No   Sig: USE AS DIRECTED   LUMIGAN 0.01 % ophthalmic drops  Care Giver Yes   Sig: INSTILL 1 DROP INTO EACH EYE ONCE DAILY AT BEDTIME   LYRICA 50 mg capsule  Care Giver Yes   Sig: TAKE 1 CAPSULE BY MOUTH TWICE DAILY   allopurinol (ZYLOPRIM) 100 mg tablet  Care Giver Yes   Sig: TAKE 1 TABLET BY MOUTH ONCE DAILY   cyanocobalamin (VITAMIN B-12) 1,000 mcg tablet  Care Giver Yes   Sig: Take 1,000 mcg by mouth daily. docusate sodium (Colace) 100 mg capsule  Care Giver Yes   Sig: Take 100 mg by mouth daily. esomeprazole (NexIUM) 40 mg capsule  Care Giver Yes   Sig: Take 40 mg by mouth daily. ferrous sulfate (IRON) 325 mg (65 mg iron) EC tablet  Care Giver Yes   Sig: Take 325 mg by mouth two (2) times daily (with meals). insulin glargine (Lantus U-100 Insulin) 100 unit/mL injection  Care Giver Yes   Si Units by SubCUTAneous route daily. lidocaine 1.8 % ptmd  Care Giver Yes   Si Patch by Apply Externally route daily as needed for Pain.   magnesium oxide 500 mg tab  Care Giver Yes   Sig: Take 500 mg by mouth daily. traMADoL (ULTRAM) 50 mg tablet  Care Giver Yes   Sig: Take 50 mg by mouth two (2) times daily as needed for Pain. traZODone (DESYREL) 50 mg tablet  Care Giver Yes   Sig: Take 1 Tab by mouth nightly as needed for Sleep.  Indications: insomnia associated with depression      Facility-Administered Medications: None        Thank you,  Sonja Maria, PHARMD

## 2021-03-15 ENCOUNTER — TELEPHONE (OUTPATIENT)
Dept: CARDIOLOGY CLINIC | Age: 86
End: 2021-03-15

## 2021-03-15 LAB
BACTERIA SPEC CULT: NORMAL
SERVICE CMNT-IMP: NORMAL

## 2021-03-15 NOTE — TELEPHONE ENCOUNTER
Spoke to family member to schedule appt for Mr. Lanie Jones and was informed he is in a rehab facility because he broke his hip. Asked them to call us back to schedule an appointment once he is home.     Dennis Alcantara

## 2021-04-06 ENCOUNTER — TELEPHONE (OUTPATIENT)
Dept: CARDIOLOGY CLINIC | Age: 86
End: 2021-04-06

## 2021-04-06 NOTE — TELEPHONE ENCOUNTER
Patient still in rehab. Advised family member to call for appt when he gets back home.     Dennis Alcantara

## 2022-03-18 PROBLEM — S72.009A HIP FRACTURE (HCC): Status: ACTIVE | Noted: 2021-03-05

## 2022-03-19 PROBLEM — E87.5 HYPERKALEMIA: Status: ACTIVE | Noted: 2021-03-05

## 2022-03-19 PROBLEM — N17.9 AKI (ACUTE KIDNEY INJURY) (HCC): Status: ACTIVE | Noted: 2021-03-05

## 2022-03-19 PROBLEM — F32.A DEPRESSIVE DISORDER: Status: ACTIVE | Noted: 2018-09-04

## 2022-03-19 PROBLEM — R56.9 SEIZURE (HCC): Status: ACTIVE | Noted: 2021-03-05

## 2022-03-20 PROBLEM — F11.24 OPIOID-INDUCED DEPRESSIVE DISORDER WITH MODERATE OR SEVERE USE DISORDER (HCC): Status: ACTIVE | Noted: 2018-09-05

## 2023-05-23 RX ORDER — PSEUDOEPHEDRINE HCL 30 MG
100 TABLET ORAL DAILY
COMMUNITY

## 2023-05-23 RX ORDER — BACLOFEN 20 MG
500 TABLET ORAL DAILY
COMMUNITY

## 2023-05-23 RX ORDER — TRAZODONE HYDROCHLORIDE 50 MG/1
50 TABLET ORAL
COMMUNITY
Start: 2018-09-07

## 2023-05-23 RX ORDER — INSULIN LISPRO 100 [IU]/ML
INJECTION, SOLUTION INTRAVENOUS; SUBCUTANEOUS
COMMUNITY
Start: 2021-03-12

## 2023-05-23 RX ORDER — LANOLIN ALCOHOL/MO/W.PET/CERES
325 CREAM (GRAM) TOPICAL 2 TIMES DAILY WITH MEALS
COMMUNITY

## 2023-05-23 RX ORDER — INSULIN GLARGINE 100 [IU]/ML
14 INJECTION, SOLUTION SUBCUTANEOUS DAILY
COMMUNITY

## 2023-05-23 RX ORDER — ESOMEPRAZOLE MAGNESIUM 40 MG/1
40 CAPSULE, DELAYED RELEASE ORAL DAILY
COMMUNITY

## 2023-05-23 RX ORDER — TRAMADOL HYDROCHLORIDE 50 MG/1
50 TABLET ORAL 2 TIMES DAILY PRN
COMMUNITY

## 2023-05-23 RX ORDER — FLUOXETINE HYDROCHLORIDE 20 MG/1
20 CAPSULE ORAL DAILY
COMMUNITY

## 2023-05-23 RX ORDER — ALLOPURINOL 100 MG/1
1 TABLET ORAL DAILY
COMMUNITY
Start: 2019-08-09

## 2023-05-23 RX ORDER — PREGABALIN 50 MG/1
1 CAPSULE ORAL 2 TIMES DAILY
COMMUNITY
Start: 2019-06-27

## 2023-05-23 RX ORDER — BIMATOPROST 0.1 MG/ML
SOLUTION/ DROPS OPHTHALMIC
COMMUNITY
Start: 2019-07-20

## 2023-12-05 ENCOUNTER — APPOINTMENT (OUTPATIENT)
Facility: HOSPITAL | Age: 88
End: 2023-12-05
Payer: MEDICAID

## 2023-12-05 ENCOUNTER — HOSPITAL ENCOUNTER (EMERGENCY)
Facility: HOSPITAL | Age: 88
Discharge: HOME | End: 2023-12-05
Attending: EMERGENCY MEDICINE
Payer: MEDICAID

## 2023-12-05 VITALS
BODY MASS INDEX: 23.49 KG/M2 | DIASTOLIC BLOOD PRESSURE: 82 MMHG | HEART RATE: 66 BPM | RESPIRATION RATE: 18 BRPM | OXYGEN SATURATION: 98 % | HEIGHT: 71 IN | SYSTOLIC BLOOD PRESSURE: 135 MMHG | TEMPERATURE: 98.3 F

## 2023-12-05 DIAGNOSIS — R20.2 NUMBNESS AND TINGLING: ICD-10-CM

## 2023-12-05 DIAGNOSIS — I10 ESSENTIAL HYPERTENSION: ICD-10-CM

## 2023-12-05 DIAGNOSIS — R20.0 NUMBNESS AND TINGLING: ICD-10-CM

## 2023-12-05 DIAGNOSIS — N18.9 CHRONIC KIDNEY DISEASE, UNSPECIFIED CKD STAGE: ICD-10-CM

## 2023-12-05 DIAGNOSIS — E11.65 UNCONTROLLED TYPE 2 DIABETES MELLITUS WITH HYPERGLYCEMIA (HCC): ICD-10-CM

## 2023-12-05 DIAGNOSIS — M79.2 NEUROPATHIC PAIN: Primary | ICD-10-CM

## 2023-12-05 LAB
ALBUMIN SERPL-MCNC: 3.8 G/DL (ref 3.5–5)
ALBUMIN/GLOB SERPL: 1 (ref 1.1–2.2)
ALP SERPL-CCNC: 122 U/L (ref 45–117)
ALT SERPL-CCNC: 23 U/L (ref 12–78)
ANION GAP SERPL CALC-SCNC: 11 MMOL/L (ref 5–15)
APPEARANCE UR: CLEAR
AST SERPL-CCNC: 28 U/L (ref 15–37)
BACTERIA URNS QL MICRO: NEGATIVE /HPF
BASOPHILS # BLD: 0.1 K/UL (ref 0–0.1)
BASOPHILS NFR BLD: 1 % (ref 0–1)
BILIRUB SERPL-MCNC: 0.4 MG/DL (ref 0.2–1)
BILIRUB UR QL: NEGATIVE
BUN SERPL-MCNC: 39 MG/DL (ref 6–20)
BUN/CREAT SERPL: 17 (ref 12–20)
CALCIUM SERPL-MCNC: 9.1 MG/DL (ref 8.5–10.1)
CHLORIDE SERPL-SCNC: 98 MMOL/L (ref 97–108)
CO2 SERPL-SCNC: 25 MMOL/L (ref 21–32)
COLOR UR: ABNORMAL
CREAT SERPL-MCNC: 2.28 MG/DL (ref 0.7–1.3)
DIFFERENTIAL METHOD BLD: ABNORMAL
EKG ATRIAL RATE: 70 BPM
EKG DIAGNOSIS: NORMAL
EKG P AXIS: 83 DEGREES
EKG P-R INTERVAL: 214 MS
EKG Q-T INTERVAL: 392 MS
EKG QRS DURATION: 68 MS
EKG QTC CALCULATION (BAZETT): 423 MS
EKG R AXIS: 13 DEGREES
EKG T AXIS: 36 DEGREES
EKG VENTRICULAR RATE: 70 BPM
EOSINOPHIL # BLD: 0.2 K/UL (ref 0–0.4)
EOSINOPHIL NFR BLD: 4 % (ref 0–7)
EPITH CASTS URNS QL MICRO: ABNORMAL /LPF
ERYTHROCYTE [DISTWIDTH] IN BLOOD BY AUTOMATED COUNT: 12.3 % (ref 11.5–14.5)
GLOBULIN SER CALC-MCNC: 3.8 G/DL (ref 2–4)
GLUCOSE BLD STRIP.AUTO-MCNC: 250 MG/DL (ref 65–117)
GLUCOSE SERPL-MCNC: 229 MG/DL (ref 65–100)
GLUCOSE UR STRIP.AUTO-MCNC: 500 MG/DL
HCT VFR BLD AUTO: 33.3 % (ref 36.6–50.3)
HGB BLD-MCNC: 11 G/DL (ref 12.1–17)
HGB UR QL STRIP: NEGATIVE
IMM GRANULOCYTES # BLD AUTO: 0 K/UL (ref 0–0.04)
IMM GRANULOCYTES NFR BLD AUTO: 0 % (ref 0–0.5)
INR PPP: 1.2 (ref 0.9–1.1)
KETONES UR QL STRIP.AUTO: NEGATIVE MG/DL
LEUKOCYTE ESTERASE UR QL STRIP.AUTO: NEGATIVE
LYMPHOCYTES # BLD: 2.1 K/UL (ref 0.8–3.5)
LYMPHOCYTES NFR BLD: 33 % (ref 12–49)
MCH RBC QN AUTO: 30.9 PG (ref 26–34)
MCHC RBC AUTO-ENTMCNC: 33 G/DL (ref 30–36.5)
MCV RBC AUTO: 93.5 FL (ref 80–99)
MONOCYTES # BLD: 0.5 K/UL (ref 0–1)
MONOCYTES NFR BLD: 8 % (ref 5–13)
NEUTS SEG # BLD: 3.5 K/UL (ref 1.8–8)
NEUTS SEG NFR BLD: 54 % (ref 32–75)
NITRITE UR QL STRIP.AUTO: NEGATIVE
NRBC # BLD: 0 K/UL (ref 0–0.01)
NRBC BLD-RTO: 0 PER 100 WBC
NT PRO BNP: 236 PG/ML (ref 0–450)
PH UR STRIP: 6 (ref 5–8)
PLATELET # BLD AUTO: 178 K/UL (ref 150–400)
PMV BLD AUTO: 10.3 FL (ref 8.9–12.9)
POTASSIUM SERPL-SCNC: 4.5 MMOL/L (ref 3.5–5.1)
PROT SERPL-MCNC: 7.6 G/DL (ref 6.4–8.2)
PROT UR STRIP-MCNC: NEGATIVE MG/DL
PROTHROMBIN TIME: 12.1 SEC (ref 9–11.1)
RBC # BLD AUTO: 3.56 M/UL (ref 4.1–5.7)
RBC #/AREA URNS HPF: ABNORMAL /HPF (ref 0–5)
SERVICE CMNT-IMP: ABNORMAL
SODIUM SERPL-SCNC: 134 MMOL/L (ref 136–145)
SP GR UR REFRACTOMETRY: 1.01
TROPONIN I SERPL HS-MCNC: 40 NG/L (ref 0–76)
URINE CULTURE IF INDICATED: ABNORMAL
UROBILINOGEN UR QL STRIP.AUTO: 0.2 EU/DL (ref 0.2–1)
WBC # BLD AUTO: 6.5 K/UL (ref 4.1–11.1)
WBC URNS QL MICRO: ABNORMAL /HPF (ref 0–4)

## 2023-12-05 PROCEDURE — 81001 URINALYSIS AUTO W/SCOPE: CPT

## 2023-12-05 PROCEDURE — 80053 COMPREHEN METABOLIC PANEL: CPT

## 2023-12-05 PROCEDURE — 36415 COLL VENOUS BLD VENIPUNCTURE: CPT

## 2023-12-05 PROCEDURE — 96374 THER/PROPH/DIAG INJ IV PUSH: CPT

## 2023-12-05 PROCEDURE — 93005 ELECTROCARDIOGRAM TRACING: CPT | Performed by: EMERGENCY MEDICINE

## 2023-12-05 PROCEDURE — 85025 COMPLETE CBC W/AUTO DIFF WBC: CPT

## 2023-12-05 PROCEDURE — 70450 CT HEAD/BRAIN W/O DYE: CPT

## 2023-12-05 PROCEDURE — 6370000000 HC RX 637 (ALT 250 FOR IP)

## 2023-12-05 PROCEDURE — 85610 PROTHROMBIN TIME: CPT

## 2023-12-05 PROCEDURE — 6360000002 HC RX W HCPCS

## 2023-12-05 PROCEDURE — 83880 ASSAY OF NATRIURETIC PEPTIDE: CPT

## 2023-12-05 PROCEDURE — 84484 ASSAY OF TROPONIN QUANT: CPT

## 2023-12-05 PROCEDURE — 71045 X-RAY EXAM CHEST 1 VIEW: CPT

## 2023-12-05 PROCEDURE — 99285 EMERGENCY DEPT VISIT HI MDM: CPT

## 2023-12-05 PROCEDURE — 82962 GLUCOSE BLOOD TEST: CPT

## 2023-12-05 PROCEDURE — 93010 ELECTROCARDIOGRAM REPORT: CPT | Performed by: SPECIALIST

## 2023-12-05 RX ORDER — LIDOCAINE 4 G/G
1 PATCH TOPICAL DAILY
Qty: 30 PATCH | Refills: 0 | Status: SHIPPED | OUTPATIENT
Start: 2023-12-05 | End: 2024-01-04

## 2023-12-05 RX ORDER — METHOCARBAMOL 500 MG/1
TABLET, FILM COATED ORAL
Status: COMPLETED
Start: 2023-12-05 | End: 2023-12-05

## 2023-12-05 RX ORDER — KETOROLAC TROMETHAMINE 30 MG/ML
INJECTION, SOLUTION INTRAMUSCULAR; INTRAVENOUS
Status: COMPLETED
Start: 2023-12-05 | End: 2023-12-05

## 2023-12-05 RX ORDER — KETOROLAC TROMETHAMINE 30 MG/ML
15 INJECTION, SOLUTION INTRAMUSCULAR; INTRAVENOUS
Status: COMPLETED | OUTPATIENT
Start: 2023-12-05 | End: 2023-12-05

## 2023-12-05 RX ORDER — METHOCARBAMOL 500 MG/1
500 TABLET, FILM COATED ORAL 3 TIMES DAILY PRN
Qty: 15 TABLET | Refills: 0 | Status: SHIPPED | OUTPATIENT
Start: 2023-12-05 | End: 2023-12-15

## 2023-12-05 RX ORDER — METHOCARBAMOL 500 MG/1
500 TABLET, FILM COATED ORAL
Status: COMPLETED | OUTPATIENT
Start: 2023-12-05 | End: 2023-12-05

## 2023-12-05 RX ADMIN — KETOROLAC TROMETHAMINE 15 MG: 30 INJECTION, SOLUTION INTRAMUSCULAR; INTRAVENOUS at 05:07

## 2023-12-05 RX ADMIN — METHOCARBAMOL 500 MG: 500 TABLET, FILM COATED ORAL at 05:07

## 2023-12-05 RX ADMIN — METHOCARBAMOL 500 MG: 500 TABLET ORAL at 05:07

## 2023-12-05 RX ADMIN — KETOROLAC TROMETHAMINE 15 MG: 30 INJECTION, SOLUTION INTRAMUSCULAR at 05:07

## 2023-12-05 ASSESSMENT — PAIN DESCRIPTION - LOCATION: LOCATION: ARM;LEG

## 2023-12-05 ASSESSMENT — PAIN DESCRIPTION - DESCRIPTORS: DESCRIPTORS: ACHING

## 2023-12-05 ASSESSMENT — PAIN DESCRIPTION - ORIENTATION: ORIENTATION: RIGHT;LEFT

## 2023-12-05 ASSESSMENT — PAIN - FUNCTIONAL ASSESSMENT: PAIN_FUNCTIONAL_ASSESSMENT: 0-10

## 2023-12-05 ASSESSMENT — PAIN SCALES - GENERAL: PAINLEVEL_OUTOF10: 10

## 2023-12-05 NOTE — ED NOTES
Patient (s)  given copy of dc instructions and 3 script(s). Patient/family (s)  verbalized understanding of instructions and script (s). Patient given a current medication reconciliation form and verbalized understanding of their medications. Patient (s) verbalized understanding of the importance of discussing medications with  his or her physician or clinic they will be following up with. Patient alert and oriented and in no acute distress. Patient discharged home ambulatory with esme.         Reny Lazar RN  12/05/23 0468

## 2023-12-05 NOTE — ED NOTES
System Downtime Recovery  The EMR experienced a system downtime. This downtime occurred on December 5 at 0150 AM for a duration of 4 hour(s). During this downtime paper charting was completed by  ED RN & MD .  The following was documented on paper during the downtime and is now being back-entered: MAR, discharge instructions. The following is remaining on paper and will be scanned into Epic: nursing assessment.            Aicha Montemayor RN  12/05/23 [id number]

## 2023-12-05 NOTE — ED TRIAGE NOTES
Bilateral leg swelling x today along w/ bilateral arm pain. Pts son is primary care taker and reports seeing worsening swelling today. Pts son also reports increased confusion and urinary frequency. X a few days. Hx of stroke x a few months back. Pt was staying at nursing facility during recovery until recently.  Home health care comes daily to check on pt

## 2023-12-05 NOTE — ED PROVIDER NOTES
EMERGENCY DEPARTMENT HISTORY AND PHYSICAL EXAM            Please note that this dictation was completed with the assistance of \"Dragon\", the computer voice recognition software. Quite often unanticipated grammatical, syntax, homophones, and other interpretive errors are inadvertently transcribed by the computer software. Please disregard these errors and any errors that have escaped final proofreading. Thank you. Date of Evaluation: 12/05/23  Patient: Mynor Walters  Patient Age and Sex: 80 y.o. male   MRN: [id number]  CSN: [id number]  PCP: Teofilo Closs, MD    History of Present Illness     Chief Complaint   Patient presents with    Leg Swelling     bilateral    Arm Pain     bilateral    Urinary Frequency     History Provided By: Patient/family/EMS (if available)    History is limited by: Nothing     HPI: Mynor Walters, 80 y.o. male with past medical history as documented below presents to the ED with c/o of bilateral arm pain, right worse than left, described as burning pain. Also c/o of right arm numbness. Sx's onset about 2 hours ago. Per son, pt appears to be slightly more confused than baseline. Per records, [place] [facility] [place] to rehab. No falls or injuries. Pt does have hx of dementia, CKD, diabetes. Pt denies any other exacerbating or ameliorating factors. There are no other complaints, changes or physical findings pertinent to the HPI at this time. Nursing notes were all reviewed and agreed with or any disagreements were addressed in the HPI.     Past History   Past Medical History:  Past Medical History:   Diagnosis Date    Chronic kidney disease     Dementia ([address])     Diabetes ([address])     Gastrointestinal disorder     Glaucoma     Gout     Hypercholesteremia     Hypertension     Other ill-defined conditions(799.89)     gout, glaucoma    Other ill-defined conditions(799.89)     high cholesterol    Seizures ([address])     Possible seizure 3/6 following administration of RA with good pleth    Cardiac Monitor:   Rate: 85 bpm  The cardiac monitor revealed the following rhythm as interpreted by me: Normal Sinus Rhythm  Cardiac and pulse ox monitoring were ordered to monitor patient for signs of cardiac dysrhythmia, which they are at risk for based on their history and/or risk for cardiovascular disease and/or metabolic abnormalities. ED EKG interpretation:  Billable EKG reviewed by ED Physician in the absence of a cardiologist: Yes  Rhythm: normal sinus rhythm; and regular . Rate (approx.): 70; Axis: normal; P wave: normal; QRS interval: normal ; ST/T wave: normal; Other findings: normal. This EKG was interpreted by Leeanna Hilton M.D. Records Reviewed: Nursing Notes, Old Medical Records, Previous electrocardiograms, Previous Radiology Studies and Previous Laboratory Studies, EMS reports    DIFFERENTIAL DIAGNOSIS AND PLAN:  Patient presents with stroke-like symptoms and seen immediately by me on arrival. Spoke with EMS and/or family regarding symptoms, Time of onset, vitals and normal glucose. DDx: ischemic vs. Hemorrhagic stroke, complex migraine, hieu's paralysis. Given the history and physical, will get a CT head and Neurology consult to determine if tNK warranted. Will continue to monitor closely in the ED and will ultimately need admission for further w/u. After discussion with Tele-Neuro, tNK will not be administered to this patient because risk of tPA with low score on NIH stroke scale.     Pertinent Chronic Medical Conditions Affecting Care:  Past Medical History:   Diagnosis Date    Chronic kidney disease     Dementia ([address])     Diabetes ([address])     Gastrointestinal disorder     Glaucoma     Gout     Hypercholesteremia     Hypertension     Other ill-defined conditions(799.89)     gout, glaucoma    Other ill-defined conditions(799.89)     high cholesterol    Seizures ([address])     Possible seizure 3/6 following administration of fentanyl     HYPERTENSION COUNSELING  For

## 2023-12-07 ASSESSMENT — ENCOUNTER SYMPTOMS
VOMITING: 0
DIARRHEA: 0
SORE THROAT: 0
SHORTNESS OF BREATH: 0
EYE PAIN: 0
COUGH: 0
RHINORRHEA: 0
ABDOMINAL PAIN: 0
NAUSEA: 0

## 2023-12-07 NOTE — DISCHARGE INSTRUCTIONS
Thank You! It was a pleasure taking care of you in our Emergency Department today. We know that when you come to [facility], you are entrusting us with your health, comfort, and safety. Our physicians and nurses honor that trust, and truly appreciate the opportunity to care for you and your loved ones. We also value your feedback. If you receive a survey about your Emergency Department experience today, please fill it out. We care about our patients' feedback, and we listen to what you have to say. Thank you. Dr. Acacia Salcedo M.D.      ____________________________________________________________________  I have included a copy of your lab results and/or radiologic studies from today's visit so you can have them easily available at your follow-up visit. We hope you feel better and please do not hesitate to contact the ED if you have any questions at all! No results found for this or any previous visit (from the past 12 hour(s)). XR CHEST PORTABLE   Final Result      No acute process. CT HEAD WO CONTRAST   Final Result   No acute intracranial process. Chronic right sphenoid sinus disease. [unfilled]  The exam and treatment you received in the Emergency Department were for an urgent problem and are not intended as complete care. It is important that you follow up with a doctor, nurse practitioner, or physician assistant for ongoing care. If your symptoms become worse or you do not improve as expected and you are unable to reach your usual health care provider, you should return to the Emergency Department. We are available 24 hours a day. Please take your discharge instructions with you when you go to your follow-up appointment. If a prescription has been provided, please have it filled as soon as possible to prevent a delay in treatment. Read the entire medication instruction sheet provided to you by the pharmacy.  If you have any questions or reservations about taking the medication due to side effects or interactions with other medications, please call your primary care physician or contact the ER to speak with the charge nurse. Please make an appointment with your family doctor or the physician you were referred to for follow-up of this visit as instructed on your discharge paperwork. Return to the ER if you are unable to be seen or if you are unable to be seen in a timely manner. If you have any problem arranging the follow-up visit, contact the Emergency Department immediately.

## 2024-02-14 ENCOUNTER — APPOINTMENT (OUTPATIENT)
Facility: HOSPITAL | Age: 89
End: 2024-02-14
Payer: MEDICARE

## 2024-02-14 ENCOUNTER — HOSPITAL ENCOUNTER (OUTPATIENT)
Facility: HOSPITAL | Age: 89
Setting detail: OBSERVATION
Discharge: NURSING, REHAB OR LONG-TERM CARE | End: 2024-02-19
Attending: EMERGENCY MEDICINE | Admitting: STUDENT IN AN ORGANIZED HEALTH CARE EDUCATION/TRAINING PROGRAM
Payer: MEDICARE

## 2024-02-14 DIAGNOSIS — G44.221 CHRONIC TENSION-TYPE HEADACHE, INTRACTABLE: Primary | ICD-10-CM

## 2024-02-14 DIAGNOSIS — Z78.9 UNABLE TO CARE FOR SELF: ICD-10-CM

## 2024-02-14 LAB
ALBUMIN SERPL-MCNC: 4.2 G/DL (ref 3.5–5)
ALBUMIN/GLOB SERPL: 1 (ref 1.1–2.2)
ALP SERPL-CCNC: 137 U/L (ref 45–117)
ALT SERPL-CCNC: 34 U/L (ref 12–78)
ANION GAP SERPL CALC-SCNC: 6 MMOL/L (ref 5–15)
APPEARANCE UR: CLEAR
AST SERPL-CCNC: 33 U/L (ref 15–37)
BACTERIA URNS QL MICRO: NEGATIVE /HPF
BASOPHILS # BLD: 0.1 K/UL (ref 0–0.1)
BASOPHILS NFR BLD: 1 % (ref 0–1)
BILIRUB SERPL-MCNC: 0.3 MG/DL (ref 0.2–1)
BILIRUB UR QL: NEGATIVE
BUN SERPL-MCNC: 49 MG/DL (ref 6–20)
BUN/CREAT SERPL: 21 (ref 12–20)
CALCIUM SERPL-MCNC: 9.7 MG/DL (ref 8.5–10.1)
CHLORIDE SERPL-SCNC: 105 MMOL/L (ref 97–108)
CO2 SERPL-SCNC: 27 MMOL/L (ref 21–32)
COLOR UR: ABNORMAL
CREAT SERPL-MCNC: 2.35 MG/DL (ref 0.7–1.3)
DIFFERENTIAL METHOD BLD: ABNORMAL
EKG ATRIAL RATE: 77 BPM
EKG DIAGNOSIS: NORMAL
EKG P AXIS: 107 DEGREES
EKG P-R INTERVAL: 202 MS
EKG Q-T INTERVAL: 392 MS
EKG QRS DURATION: 72 MS
EKG QTC CALCULATION (BAZETT): 443 MS
EKG R AXIS: 6 DEGREES
EKG T AXIS: 58 DEGREES
EKG VENTRICULAR RATE: 77 BPM
EOSINOPHIL # BLD: 0.3 K/UL (ref 0–0.4)
EOSINOPHIL NFR BLD: 4 % (ref 0–7)
EPITH CASTS URNS QL MICRO: ABNORMAL /LPF
ERYTHROCYTE [DISTWIDTH] IN BLOOD BY AUTOMATED COUNT: 12.2 % (ref 11.5–14.5)
GLOBULIN SER CALC-MCNC: 4.3 G/DL (ref 2–4)
GLUCOSE SERPL-MCNC: 218 MG/DL (ref 65–100)
GLUCOSE UR STRIP.AUTO-MCNC: 500 MG/DL
HCT VFR BLD AUTO: 38 % (ref 36.6–50.3)
HGB BLD-MCNC: 12.6 G/DL (ref 12.1–17)
HGB UR QL STRIP: NEGATIVE
HYALINE CASTS URNS QL MICRO: ABNORMAL /LPF (ref 0–2)
IMM GRANULOCYTES # BLD AUTO: 0 K/UL (ref 0–0.04)
IMM GRANULOCYTES NFR BLD AUTO: 0 % (ref 0–0.5)
KETONES UR QL STRIP.AUTO: NEGATIVE MG/DL
LEUKOCYTE ESTERASE UR QL STRIP.AUTO: NEGATIVE
LYMPHOCYTES # BLD: 1.3 K/UL (ref 0.8–3.5)
LYMPHOCYTES NFR BLD: 17 % (ref 12–49)
MCH RBC QN AUTO: 31.1 PG (ref 26–34)
MCHC RBC AUTO-ENTMCNC: 33.2 G/DL (ref 30–36.5)
MCV RBC AUTO: 93.8 FL (ref 80–99)
MONOCYTES # BLD: 0.6 K/UL (ref 0–1)
MONOCYTES NFR BLD: 7 % (ref 5–13)
NEUTS SEG # BLD: 5.7 K/UL (ref 1.8–8)
NEUTS SEG NFR BLD: 71 % (ref 32–75)
NITRITE UR QL STRIP.AUTO: NEGATIVE
NRBC # BLD: 0 K/UL (ref 0–0.01)
NRBC BLD-RTO: 0 PER 100 WBC
PH UR STRIP: 6 (ref 5–8)
PLATELET # BLD AUTO: 164 K/UL (ref 150–400)
PMV BLD AUTO: 10.7 FL (ref 8.9–12.9)
POTASSIUM SERPL-SCNC: 4.4 MMOL/L (ref 3.5–5.1)
PROT SERPL-MCNC: 8.5 G/DL (ref 6.4–8.2)
PROT UR STRIP-MCNC: 30 MG/DL
RBC # BLD AUTO: 4.05 M/UL (ref 4.1–5.7)
RBC #/AREA URNS HPF: ABNORMAL /HPF (ref 0–5)
SARS-COV-2 RDRP RESP QL NAA+PROBE: NOT DETECTED
SODIUM SERPL-SCNC: 138 MMOL/L (ref 136–145)
SOURCE: NORMAL
SP GR UR REFRACTOMETRY: 1.02
TROPONIN I SERPL HS-MCNC: 41 NG/L (ref 0–76)
URINE CULTURE IF INDICATED: ABNORMAL
UROBILINOGEN UR QL STRIP.AUTO: 0.2 EU/DL (ref 0.2–1)
WBC # BLD AUTO: 8 K/UL (ref 4.1–11.1)
WBC URNS QL MICRO: ABNORMAL /HPF (ref 0–4)

## 2024-02-14 PROCEDURE — 85025 COMPLETE CBC W/AUTO DIFF WBC: CPT

## 2024-02-14 PROCEDURE — 96374 THER/PROPH/DIAG INJ IV PUSH: CPT

## 2024-02-14 PROCEDURE — 99285 EMERGENCY DEPT VISIT HI MDM: CPT

## 2024-02-14 PROCEDURE — 80053 COMPREHEN METABOLIC PANEL: CPT

## 2024-02-14 PROCEDURE — 81001 URINALYSIS AUTO W/SCOPE: CPT

## 2024-02-14 PROCEDURE — 36415 COLL VENOUS BLD VENIPUNCTURE: CPT

## 2024-02-14 PROCEDURE — 84484 ASSAY OF TROPONIN QUANT: CPT

## 2024-02-14 PROCEDURE — 87635 SARS-COV-2 COVID-19 AMP PRB: CPT

## 2024-02-14 PROCEDURE — 71045 X-RAY EXAM CHEST 1 VIEW: CPT

## 2024-02-14 ASSESSMENT — PAIN SCALES - GENERAL: PAINLEVEL_OUTOF10: 6

## 2024-02-15 ENCOUNTER — APPOINTMENT (OUTPATIENT)
Facility: HOSPITAL | Age: 89
End: 2024-02-15
Payer: MEDICARE

## 2024-02-15 PROBLEM — R53.81 DEBILITY: Status: ACTIVE | Noted: 2024-02-15

## 2024-02-15 LAB
GLUCOSE BLD STRIP.AUTO-MCNC: 135 MG/DL (ref 65–117)
GLUCOSE BLD STRIP.AUTO-MCNC: 145 MG/DL (ref 65–117)
GLUCOSE BLD STRIP.AUTO-MCNC: 183 MG/DL (ref 65–117)
GLUCOSE BLD STRIP.AUTO-MCNC: 198 MG/DL (ref 65–117)
GLUCOSE BLD STRIP.AUTO-MCNC: 214 MG/DL (ref 65–117)
SERVICE CMNT-IMP: ABNORMAL

## 2024-02-15 PROCEDURE — 6360000002 HC RX W HCPCS: Performed by: STUDENT IN AN ORGANIZED HEALTH CARE EDUCATION/TRAINING PROGRAM

## 2024-02-15 PROCEDURE — 6370000000 HC RX 637 (ALT 250 FOR IP): Performed by: STUDENT IN AN ORGANIZED HEALTH CARE EDUCATION/TRAINING PROGRAM

## 2024-02-15 PROCEDURE — 82962 GLUCOSE BLOOD TEST: CPT

## 2024-02-15 PROCEDURE — 2580000003 HC RX 258: Performed by: STUDENT IN AN ORGANIZED HEALTH CARE EDUCATION/TRAINING PROGRAM

## 2024-02-15 PROCEDURE — 6370000000 HC RX 637 (ALT 250 FOR IP): Performed by: INTERNAL MEDICINE

## 2024-02-15 PROCEDURE — 97530 THERAPEUTIC ACTIVITIES: CPT

## 2024-02-15 PROCEDURE — 6370000000 HC RX 637 (ALT 250 FOR IP): Performed by: EMERGENCY MEDICINE

## 2024-02-15 PROCEDURE — 97162 PT EVAL MOD COMPLEX 30 MIN: CPT

## 2024-02-15 PROCEDURE — G0378 HOSPITAL OBSERVATION PER HR: HCPCS

## 2024-02-15 PROCEDURE — 6360000002 HC RX W HCPCS: Performed by: EMERGENCY MEDICINE

## 2024-02-15 PROCEDURE — 97535 SELF CARE MNGMENT TRAINING: CPT

## 2024-02-15 PROCEDURE — 2580000003 HC RX 258: Performed by: EMERGENCY MEDICINE

## 2024-02-15 PROCEDURE — 96374 THER/PROPH/DIAG INJ IV PUSH: CPT

## 2024-02-15 PROCEDURE — 96372 THER/PROPH/DIAG INJ SC/IM: CPT

## 2024-02-15 PROCEDURE — 96361 HYDRATE IV INFUSION ADD-ON: CPT

## 2024-02-15 PROCEDURE — 70450 CT HEAD/BRAIN W/O DYE: CPT

## 2024-02-15 PROCEDURE — 97167 OT EVAL HIGH COMPLEX 60 MIN: CPT

## 2024-02-15 RX ORDER — LATANOPROST 50 UG/ML
1 SOLUTION/ DROPS OPHTHALMIC NIGHTLY
Status: DISCONTINUED | OUTPATIENT
Start: 2024-02-15 | End: 2024-02-19 | Stop reason: HOSPADM

## 2024-02-15 RX ORDER — BUTALBITAL, ACETAMINOPHEN AND CAFFEINE 50; 325; 40 MG/1; MG/1; MG/1
1 TABLET ORAL EVERY 4 HOURS PRN
Status: DISCONTINUED | OUTPATIENT
Start: 2024-02-15 | End: 2024-02-19 | Stop reason: HOSPADM

## 2024-02-15 RX ORDER — CALCITRIOL 0.25 UG/1
0.25 CAPSULE, LIQUID FILLED ORAL EVERY OTHER DAY
COMMUNITY

## 2024-02-15 RX ORDER — SODIUM CHLORIDE 0.9 % (FLUSH) 0.9 %
5-40 SYRINGE (ML) INJECTION EVERY 12 HOURS SCHEDULED
Status: DISCONTINUED | OUTPATIENT
Start: 2024-02-15 | End: 2024-02-19 | Stop reason: HOSPADM

## 2024-02-15 RX ORDER — INSULIN LISPRO 100 [IU]/ML
0-8 INJECTION, SOLUTION INTRAVENOUS; SUBCUTANEOUS
Status: DISCONTINUED | OUTPATIENT
Start: 2024-02-15 | End: 2024-02-19 | Stop reason: HOSPADM

## 2024-02-15 RX ORDER — SODIUM CHLORIDE 0.9 % (FLUSH) 0.9 %
5-40 SYRINGE (ML) INJECTION PRN
Status: DISCONTINUED | OUTPATIENT
Start: 2024-02-15 | End: 2024-02-19 | Stop reason: HOSPADM

## 2024-02-15 RX ORDER — INSULIN LISPRO 100 [IU]/ML
0-4 INJECTION, SOLUTION INTRAVENOUS; SUBCUTANEOUS NIGHTLY
Status: DISCONTINUED | OUTPATIENT
Start: 2024-02-15 | End: 2024-02-19 | Stop reason: HOSPADM

## 2024-02-15 RX ORDER — TRAMADOL HYDROCHLORIDE 50 MG/1
50 TABLET ORAL EVERY 6 HOURS PRN
Status: DISCONTINUED | OUTPATIENT
Start: 2024-02-15 | End: 2024-02-19 | Stop reason: HOSPADM

## 2024-02-15 RX ORDER — CASTOR OIL AND BALSAM, PERU 788; 87 MG/G; MG/G
OINTMENT TOPICAL 2 TIMES DAILY
Status: DISCONTINUED | OUTPATIENT
Start: 2024-02-15 | End: 2024-02-19 | Stop reason: HOSPADM

## 2024-02-15 RX ORDER — BUTALBITAL, ACETAMINOPHEN AND CAFFEINE 50; 325; 40 MG/1; MG/1; MG/1
1 TABLET ORAL 3 TIMES DAILY PRN
COMMUNITY

## 2024-02-15 RX ORDER — METOCLOPRAMIDE HYDROCHLORIDE 5 MG/ML
10 INJECTION INTRAMUSCULAR; INTRAVENOUS ONCE
Status: COMPLETED | OUTPATIENT
Start: 2024-02-15 | End: 2024-02-15

## 2024-02-15 RX ORDER — DEXTROSE MONOHYDRATE 100 MG/ML
INJECTION, SOLUTION INTRAVENOUS CONTINUOUS PRN
Status: DISCONTINUED | OUTPATIENT
Start: 2024-02-15 | End: 2024-02-19 | Stop reason: HOSPADM

## 2024-02-15 RX ORDER — HEPARIN SODIUM 5000 [USP'U]/ML
5000 INJECTION, SOLUTION INTRAVENOUS; SUBCUTANEOUS EVERY 8 HOURS SCHEDULED
Status: DISCONTINUED | OUTPATIENT
Start: 2024-02-15 | End: 2024-02-19 | Stop reason: HOSPADM

## 2024-02-15 RX ORDER — ACETAMINOPHEN 500 MG
1000 TABLET ORAL
Status: COMPLETED | OUTPATIENT
Start: 2024-02-15 | End: 2024-02-15

## 2024-02-15 RX ORDER — ACETAMINOPHEN 325 MG/1
650 TABLET ORAL EVERY 6 HOURS PRN
Status: DISCONTINUED | OUTPATIENT
Start: 2024-02-15 | End: 2024-02-19 | Stop reason: HOSPADM

## 2024-02-15 RX ORDER — POLYETHYLENE GLYCOL 3350 17 G/17G
17 POWDER, FOR SOLUTION ORAL DAILY PRN
Status: DISCONTINUED | OUTPATIENT
Start: 2024-02-15 | End: 2024-02-19 | Stop reason: HOSPADM

## 2024-02-15 RX ORDER — SODIUM BICARBONATE 650 MG/1
1300 TABLET ORAL 2 TIMES DAILY
Status: ON HOLD | COMMUNITY
End: 2024-02-19

## 2024-02-15 RX ORDER — DORZOLAMIDE HCL 20 MG/ML
1 SOLUTION/ DROPS OPHTHALMIC 2 TIMES DAILY
COMMUNITY

## 2024-02-15 RX ORDER — ACETAMINOPHEN 650 MG/1
650 SUPPOSITORY RECTAL EVERY 6 HOURS PRN
Status: DISCONTINUED | OUTPATIENT
Start: 2024-02-15 | End: 2024-02-19 | Stop reason: HOSPADM

## 2024-02-15 RX ORDER — GABAPENTIN 100 MG/1
100 CAPSULE ORAL 2 TIMES DAILY
COMMUNITY

## 2024-02-15 RX ORDER — ATORVASTATIN CALCIUM 20 MG/1
20 TABLET, FILM COATED ORAL DAILY
COMMUNITY

## 2024-02-15 RX ORDER — ONDANSETRON 2 MG/ML
4 INJECTION INTRAMUSCULAR; INTRAVENOUS EVERY 6 HOURS PRN
Status: DISCONTINUED | OUTPATIENT
Start: 2024-02-15 | End: 2024-02-19 | Stop reason: HOSPADM

## 2024-02-15 RX ORDER — INSULIN GLARGINE 100 [IU]/ML
18 INJECTION, SOLUTION SUBCUTANEOUS DAILY
Status: ON HOLD | COMMUNITY
End: 2024-02-19

## 2024-02-15 RX ORDER — ASPIRIN 81 MG/1
81 TABLET ORAL DAILY
COMMUNITY

## 2024-02-15 RX ORDER — ONDANSETRON 4 MG/1
4 TABLET, ORALLY DISINTEGRATING ORAL EVERY 8 HOURS PRN
Status: DISCONTINUED | OUTPATIENT
Start: 2024-02-15 | End: 2024-02-19 | Stop reason: HOSPADM

## 2024-02-15 RX ORDER — GLUCAGON 1 MG/ML
1 KIT INJECTION PRN
Status: DISCONTINUED | OUTPATIENT
Start: 2024-02-15 | End: 2024-02-19 | Stop reason: HOSPADM

## 2024-02-15 RX ORDER — 0.9 % SODIUM CHLORIDE 0.9 %
1000 INTRAVENOUS SOLUTION INTRAVENOUS ONCE
Status: COMPLETED | OUTPATIENT
Start: 2024-02-15 | End: 2024-02-15

## 2024-02-15 RX ORDER — AMLODIPINE BESYLATE 5 MG/1
5 TABLET ORAL DAILY
COMMUNITY

## 2024-02-15 RX ORDER — SODIUM CHLORIDE 9 MG/ML
INJECTION, SOLUTION INTRAVENOUS PRN
Status: DISCONTINUED | OUTPATIENT
Start: 2024-02-15 | End: 2024-02-19 | Stop reason: HOSPADM

## 2024-02-15 RX ORDER — SODIUM CHLORIDE, SODIUM LACTATE, POTASSIUM CHLORIDE, CALCIUM CHLORIDE 600; 310; 30; 20 MG/100ML; MG/100ML; MG/100ML; MG/100ML
INJECTION, SOLUTION INTRAVENOUS CONTINUOUS
Status: ACTIVE | OUTPATIENT
Start: 2024-02-15 | End: 2024-02-15

## 2024-02-15 RX ADMIN — BUTALBITAL, ACETAMINOPHEN, AND CAFFEINE 1 TABLET: 50; 325; 40 TABLET ORAL at 22:18

## 2024-02-15 RX ADMIN — SODIUM CHLORIDE 1000 ML: 9 INJECTION, SOLUTION INTRAVENOUS at 00:47

## 2024-02-15 RX ADMIN — BUTALBITAL, ACETAMINOPHEN, AND CAFFEINE 1 TABLET: 50; 325; 40 TABLET ORAL at 16:24

## 2024-02-15 RX ADMIN — HEPARIN SODIUM 5000 UNITS: 5000 INJECTION INTRAVENOUS; SUBCUTANEOUS at 21:39

## 2024-02-15 RX ADMIN — ACETAMINOPHEN 1000 MG: 500 TABLET ORAL at 00:47

## 2024-02-15 RX ADMIN — SODIUM CHLORIDE, POTASSIUM CHLORIDE, SODIUM LACTATE AND CALCIUM CHLORIDE: 600; 310; 30; 20 INJECTION, SOLUTION INTRAVENOUS at 06:12

## 2024-02-15 RX ADMIN — ACETAMINOPHEN 650 MG: 325 TABLET ORAL at 10:57

## 2024-02-15 RX ADMIN — HEPARIN SODIUM 5000 UNITS: 5000 INJECTION INTRAVENOUS; SUBCUTANEOUS at 13:22

## 2024-02-15 RX ADMIN — Medication: at 21:41

## 2024-02-15 RX ADMIN — SODIUM CHLORIDE, PRESERVATIVE FREE 10 ML: 5 INJECTION INTRAVENOUS at 22:18

## 2024-02-15 RX ADMIN — SODIUM CHLORIDE, PRESERVATIVE FREE 10 ML: 5 INJECTION INTRAVENOUS at 08:21

## 2024-02-15 RX ADMIN — METOCLOPRAMIDE 10 MG: 5 INJECTION, SOLUTION INTRAMUSCULAR; INTRAVENOUS at 00:47

## 2024-02-15 RX ADMIN — LATANOPROST 1 DROP: 50 SOLUTION OPHTHALMIC at 23:51

## 2024-02-15 ASSESSMENT — PAIN SCALES - GENERAL
PAINLEVEL_OUTOF10: 3
PAINLEVEL_OUTOF10: 3
PAINLEVEL_OUTOF10: 8
PAINLEVEL_OUTOF10: 9
PAINLEVEL_OUTOF10: 5
PAINLEVEL_OUTOF10: 7
PAINLEVEL_OUTOF10: 2
PAINLEVEL_OUTOF10: 0

## 2024-02-15 ASSESSMENT — PAIN DESCRIPTION - DESCRIPTORS
DESCRIPTORS: ACHING

## 2024-02-15 ASSESSMENT — PAIN DESCRIPTION - LOCATION
LOCATION: HEAD

## 2024-02-15 ASSESSMENT — PAIN DESCRIPTION - ORIENTATION
ORIENTATION: ANTERIOR
ORIENTATION: ANTERIOR

## 2024-02-15 NOTE — H&P
Hospitalist Admission Note    NAME:  Anton Gomes   :  10/6/1932   MRN:  [id number]     Date/Time:  2/15/2024 5:43 AM    Patient PCP: Jason Ray MD    ______________________________________________________________________  Given the patient's current clinical presentation, I have a high level of concern for decompensation if discharged from the emergency department.  Complex decision making was performed, which includes reviewing the patient's available past medical records, laboratory results, and x-ray films.       My assessment of this patient's clinical condition and my plan of care is as follows.    Assessment / Plan:    Active Problems:  Debility  Care needs exceeding family's capacity  Dementia  Essential hypertension  Hyperlipidemia  Headache  Glaucoma  Diabetes mellitus    Plan:  Debility  Care needs exceeding family's capacity  Dementia  Admit to medical observation  PT/OT consulted  Case management consulted  Unable to contact patient's family at this time.  Reports he he is unsure why he was sent and simply saying that he just did not feel well and asked to do what his nephew who cares for him says.  Reports he believes they are tired of them and do not want to care for him anymore.  Sounds as though he has become progressively more debilitated now essentially bedbound requiring significant assistance to ambulate.    Headache  Trialed Tylenol and Reglan-modest improvement  Given 1 L normal saline in ED starting maintenance fluids  Resume PTA tramadol-possibly withdrawal seizure    Essential hypertension  Hyperlipidemia  Verifying medications    Glaucoma  Continue listed latanoprost eyedrops    Diabetes mellitus  Corrective coverage insulin  Accu-Cheks  Diabetic diet  Hypoglycemia protocol in place    Patient with underlying dementia and unreliable historian.  Will need to verify medications prior to reinitiation.  Pharmacy consulted for medication reconciliation.    Medical Decision

## 2024-02-15 NOTE — ED NOTES
0608: Attempted to call report, RN unavailable. Will call back.     0621: Attempted to call report, RN unable to come to phone. Verbalized that report miust be given before day shift. Callback number given.

## 2024-02-15 NOTE — ED PROVIDER NOTES
[facility] EMERGENCY DEPT  EMERGENCY DEPARTMENT ENCOUNTER         Pt Name: Anton Gomes  MRN: [id number]  Birthdate 10/6/1932  Date of evaluation: 2/14/2024  Provider: Albina Brian MD   PCP: Jason Ray MD  Note Started: 12:35 AM 2/15/24     CHIEF COMPLAINT       Chief Complaint   Patient presents with    Fatigue     BIBEMS for complaint of generalized weakness and body aches x 4 days.  He lives with his son.  No fever.           HISTORY OF PRESENT ILLNESS: 1 or more elements      History From: Patient, EMS, and patient's grandson  HPI Limitations: Dementia     Anton Gomes is a 89 y.o. male whose history is listed below and includes Dementia, CKD stage 4 with Proteinuria, secondary hyperparathyroidism, Vitamin D deficiency, HTN, Gout, peripheral neuropathy T2DM and insuline dependent, who presents to the ED via ambulance from his grandson's house with generalized weakness. History is limited due to patient's dementia. Patient himself states that he has had a headache for months and that his nephew (refers to grandson as nephew) called the ambulance because he was \"tired of taking care of me.\" Collateral info obtained from grandson who states that patient had been living with his girlfriend until last fall when caring for him became too difficult for her and he was moved to the grandson's house. Grandcecelia lives with wife and three children. They get no outside help in care for the patient. Grandcecelia reports that he has been helping him bathe, has been feeding him, taking him to appointments. Over last four days patient has been refusing to eat or get out of bed and has been just moaning most of the day complaining about his head hurting and him not feeling well. Today he finally called the ambulance.  Per chart review patient has a history of tension headaches.      Please see more comprehensive history below under MDM  Nursing Notes were all reviewed in real time as they are made available. Any

## 2024-02-15 NOTE — CARE COORDINATION
Care Management Initial Assessment       RUR: obs   Readmission? No  1st IM letter given? No  1st  letter given: No      CM spoke with pts nephew: Victor Hugo Gomes, via telephone.  Pt is known to reside with family in their one story home.  Pt reported that pt is active with PCP and use [facility] ([address]  Pt is known to need assistance with ADLs.  Pt has DME at home and reports of SNF Placement at [facility].    Victor Hugo shared concerns of pt returning home and beng able to appropriately care for pt.  Victor Hugo reported that he is currently interested in placement, however, he would like to discuss options with family.  Victor Hugo reported that he would like referral to be sent to [facility], in case family are agreeable to placement options.   CM placed referral to [facility] to initiate process of acceptance.  CM will continue to follow.    AMPARO Frias CM  [phone]       02/15/24 1150   Service Assessment   Patient Orientation Other (see comment)   Cognition Alert   History Provided By Child/Family   Primary Caregiver Family   Support Systems Family Members   PCP Verified by CM Yes   Last Visit to PCP Within last 3 months   Prior Functional Level Assistance with the following:;Bathing;Dressing;Toileting;Cooking;Housework;Shopping;Mobility   Current Functional Level Assistance with the following:;Bathing;Dressing;Toileting;Cooking;Housework;Shopping;Mobility   Can patient return to prior living arrangement Yes   Ability to make needs known: Fair   Family able to assist with home care needs: Yes   Would you like for me to discuss the discharge plan with any other family members/significant others, and if so, who? Yes   Financial Resources Medicare   Social/Functional History   Lives With Family   Type of Home House   Active  No   Mode of Transportation Family   Discharge Planning   Type of Residence House   Living Arrangements Family Members

## 2024-02-15 NOTE — ED NOTES
0030: MD at bedside. Pt lives with nephew, says he is unable to walk. C/o headache, able to follow commands and answer most questions. When asked why he came in today and who called 911, pt said \"I think they got tired of me\"    0445: Assisted pt back into bed, Brief changed.    0530: Roc TRAN and Eliseo TRAN at bedside. Pt reports he requires a soft diet.     0550: Pt's caregiver/grandson called for update, phone passed to Dr Brian to speak w family. Grandson said he is unable to care for pt on his own and meet all of his needs w no assistance. Pt requires full assistance w bathing, feeding, unable to get around on own.

## 2024-02-16 LAB
ANION GAP SERPL CALC-SCNC: 6 MMOL/L (ref 5–15)
BASOPHILS # BLD: 0.1 K/UL (ref 0–0.1)
BASOPHILS NFR BLD: 1 % (ref 0–1)
BUN SERPL-MCNC: 38 MG/DL (ref 6–20)
BUN/CREAT SERPL: 17 (ref 12–20)
CALCIUM SERPL-MCNC: 9.4 MG/DL (ref 8.5–10.1)
CHLORIDE SERPL-SCNC: 105 MMOL/L (ref 97–108)
CO2 SERPL-SCNC: 26 MMOL/L (ref 21–32)
CREAT SERPL-MCNC: 2.27 MG/DL (ref 0.7–1.3)
DIFFERENTIAL METHOD BLD: ABNORMAL
EOSINOPHIL # BLD: 0.5 K/UL (ref 0–0.4)
EOSINOPHIL NFR BLD: 7 % (ref 0–7)
ERYTHROCYTE [DISTWIDTH] IN BLOOD BY AUTOMATED COUNT: 12.3 % (ref 11.5–14.5)
GLUCOSE BLD STRIP.AUTO-MCNC: 117 MG/DL (ref 65–117)
GLUCOSE BLD STRIP.AUTO-MCNC: 140 MG/DL (ref 65–117)
GLUCOSE BLD STRIP.AUTO-MCNC: 144 MG/DL (ref 65–117)
GLUCOSE BLD STRIP.AUTO-MCNC: 200 MG/DL (ref 65–117)
GLUCOSE SERPL-MCNC: 200 MG/DL (ref 65–100)
HCT VFR BLD AUTO: 38.3 % (ref 36.6–50.3)
HGB BLD-MCNC: 12.6 G/DL (ref 12.1–17)
IMM GRANULOCYTES # BLD AUTO: 0 K/UL (ref 0–0.04)
IMM GRANULOCYTES NFR BLD AUTO: 0 % (ref 0–0.5)
LYMPHOCYTES # BLD: 1.9 K/UL (ref 0.8–3.5)
LYMPHOCYTES NFR BLD: 29 % (ref 12–49)
MCH RBC QN AUTO: 30 PG (ref 26–34)
MCHC RBC AUTO-ENTMCNC: 32.9 G/DL (ref 30–36.5)
MCV RBC AUTO: 91.2 FL (ref 80–99)
MONOCYTES # BLD: 0.5 K/UL (ref 0–1)
MONOCYTES NFR BLD: 7 % (ref 5–13)
NEUTS SEG # BLD: 3.7 K/UL (ref 1.8–8)
NEUTS SEG NFR BLD: 56 % (ref 32–75)
NRBC # BLD: 0 K/UL (ref 0–0.01)
NRBC BLD-RTO: 0 PER 100 WBC
PLATELET # BLD AUTO: 154 K/UL (ref 150–400)
PMV BLD AUTO: 10.7 FL (ref 8.9–12.9)
POTASSIUM SERPL-SCNC: 4.3 MMOL/L (ref 3.5–5.1)
RBC # BLD AUTO: 4.2 M/UL (ref 4.1–5.7)
SERVICE CMNT-IMP: ABNORMAL
SERVICE CMNT-IMP: NORMAL
SODIUM SERPL-SCNC: 137 MMOL/L (ref 136–145)
WBC # BLD AUTO: 6.6 K/UL (ref 4.1–11.1)

## 2024-02-16 PROCEDURE — 82962 GLUCOSE BLOOD TEST: CPT

## 2024-02-16 PROCEDURE — 36415 COLL VENOUS BLD VENIPUNCTURE: CPT

## 2024-02-16 PROCEDURE — 96372 THER/PROPH/DIAG INJ SC/IM: CPT

## 2024-02-16 PROCEDURE — 85025 COMPLETE CBC W/AUTO DIFF WBC: CPT

## 2024-02-16 PROCEDURE — 6370000000 HC RX 637 (ALT 250 FOR IP): Performed by: STUDENT IN AN ORGANIZED HEALTH CARE EDUCATION/TRAINING PROGRAM

## 2024-02-16 PROCEDURE — 2580000003 HC RX 258: Performed by: STUDENT IN AN ORGANIZED HEALTH CARE EDUCATION/TRAINING PROGRAM

## 2024-02-16 PROCEDURE — G0378 HOSPITAL OBSERVATION PER HR: HCPCS

## 2024-02-16 PROCEDURE — 6370000000 HC RX 637 (ALT 250 FOR IP): Performed by: INTERNAL MEDICINE

## 2024-02-16 PROCEDURE — 6360000002 HC RX W HCPCS: Performed by: STUDENT IN AN ORGANIZED HEALTH CARE EDUCATION/TRAINING PROGRAM

## 2024-02-16 PROCEDURE — 80048 BASIC METABOLIC PNL TOTAL CA: CPT

## 2024-02-16 RX ORDER — CALCITRIOL 0.25 UG/1
0.25 CAPSULE, LIQUID FILLED ORAL EVERY OTHER DAY
Status: DISCONTINUED | OUTPATIENT
Start: 2024-02-16 | End: 2024-02-19 | Stop reason: HOSPADM

## 2024-02-16 RX ORDER — ATORVASTATIN CALCIUM 20 MG/1
20 TABLET, FILM COATED ORAL DAILY
Status: DISCONTINUED | OUTPATIENT
Start: 2024-02-16 | End: 2024-02-19 | Stop reason: HOSPADM

## 2024-02-16 RX ORDER — ALLOPURINOL 100 MG/1
100 TABLET ORAL DAILY
Status: DISCONTINUED | OUTPATIENT
Start: 2024-02-16 | End: 2024-02-19 | Stop reason: HOSPADM

## 2024-02-16 RX ORDER — ASPIRIN 81 MG/1
81 TABLET ORAL DAILY
Status: DISCONTINUED | OUTPATIENT
Start: 2024-02-16 | End: 2024-02-19 | Stop reason: HOSPADM

## 2024-02-16 RX ORDER — GABAPENTIN 100 MG/1
100 CAPSULE ORAL 2 TIMES DAILY
Status: DISCONTINUED | OUTPATIENT
Start: 2024-02-16 | End: 2024-02-19 | Stop reason: HOSPADM

## 2024-02-16 RX ORDER — INSULIN GLARGINE 100 [IU]/ML
10 INJECTION, SOLUTION SUBCUTANEOUS DAILY
Status: DISCONTINUED | OUTPATIENT
Start: 2024-02-16 | End: 2024-02-19 | Stop reason: HOSPADM

## 2024-02-16 RX ORDER — SODIUM BICARBONATE 650 MG/1
1300 TABLET ORAL 2 TIMES DAILY
Status: DISCONTINUED | OUTPATIENT
Start: 2024-02-16 | End: 2024-02-19 | Stop reason: HOSPADM

## 2024-02-16 RX ORDER — AMLODIPINE BESYLATE 5 MG/1
5 TABLET ORAL DAILY
Status: DISCONTINUED | OUTPATIENT
Start: 2024-02-16 | End: 2024-02-19 | Stop reason: HOSPADM

## 2024-02-16 RX ADMIN — ASPIRIN 81 MG: 81 TABLET, COATED ORAL at 15:02

## 2024-02-16 RX ADMIN — Medication: at 09:40

## 2024-02-16 RX ADMIN — HEPARIN SODIUM 5000 UNITS: 5000 INJECTION INTRAVENOUS; SUBCUTANEOUS at 21:09

## 2024-02-16 RX ADMIN — BUTALBITAL, ACETAMINOPHEN, AND CAFFEINE 1 TABLET: 50; 325; 40 TABLET ORAL at 15:03

## 2024-02-16 RX ADMIN — GABAPENTIN 100 MG: 100 CAPSULE ORAL at 21:09

## 2024-02-16 RX ADMIN — INSULIN LISPRO 2 UNITS: 100 INJECTION, SOLUTION INTRAVENOUS; SUBCUTANEOUS at 12:02

## 2024-02-16 RX ADMIN — ALLOPURINOL 100 MG: 100 TABLET ORAL at 15:03

## 2024-02-16 RX ADMIN — AMLODIPINE BESYLATE 5 MG: 5 TABLET ORAL at 15:02

## 2024-02-16 RX ADMIN — BUTALBITAL, ACETAMINOPHEN, AND CAFFEINE 1 TABLET: 50; 325; 40 TABLET ORAL at 09:39

## 2024-02-16 RX ADMIN — HEPARIN SODIUM 5000 UNITS: 5000 INJECTION INTRAVENOUS; SUBCUTANEOUS at 15:02

## 2024-02-16 RX ADMIN — CALCITRIOL CAPSULES 0.25 MCG 0.25 MCG: 0.25 CAPSULE ORAL at 15:02

## 2024-02-16 RX ADMIN — INSULIN GLARGINE 10 UNITS: 100 INJECTION, SOLUTION SUBCUTANEOUS at 15:01

## 2024-02-16 RX ADMIN — HEPARIN SODIUM 5000 UNITS: 5000 INJECTION INTRAVENOUS; SUBCUTANEOUS at 06:13

## 2024-02-16 RX ADMIN — LATANOPROST 1 DROP: 50 SOLUTION OPHTHALMIC at 21:10

## 2024-02-16 RX ADMIN — GABAPENTIN 100 MG: 100 CAPSULE ORAL at 15:03

## 2024-02-16 RX ADMIN — SODIUM BICARBONATE 1300 MG: 650 TABLET ORAL at 21:08

## 2024-02-16 RX ADMIN — SODIUM CHLORIDE, PRESERVATIVE FREE 10 ML: 5 INJECTION INTRAVENOUS at 21:11

## 2024-02-16 RX ADMIN — TRAMADOL HYDROCHLORIDE 50 MG: 50 TABLET ORAL at 21:15

## 2024-02-16 RX ADMIN — SODIUM BICARBONATE 1300 MG: 650 TABLET ORAL at 15:02

## 2024-02-16 RX ADMIN — ATORVASTATIN CALCIUM 20 MG: 20 TABLET, FILM COATED ORAL at 15:03

## 2024-02-16 RX ADMIN — SODIUM CHLORIDE, PRESERVATIVE FREE 10 ML: 5 INJECTION INTRAVENOUS at 09:40

## 2024-02-16 RX ADMIN — Medication: at 21:10

## 2024-02-16 ASSESSMENT — PAIN SCALES - GENERAL
PAINLEVEL_OUTOF10: 2
PAINLEVEL_OUTOF10: 5
PAINLEVEL_OUTOF10: 8

## 2024-02-16 ASSESSMENT — PAIN DESCRIPTION - LOCATION
LOCATION: HEAD;NECK
LOCATION: HEAD
LOCATION: BACK

## 2024-02-16 ASSESSMENT — PAIN DESCRIPTION - DESCRIPTORS
DESCRIPTORS: ACHING
DESCRIPTORS: ACHING;SORE
DESCRIPTORS: ACHING

## 2024-02-16 ASSESSMENT — PAIN DESCRIPTION - ORIENTATION
ORIENTATION: LOWER
ORIENTATION: ANTERIOR;RIGHT;LEFT
ORIENTATION: ANTERIOR

## 2024-02-16 NOTE — CARE COORDINATION
UPDATE: 11:16AM    KIMBERLY spoke with pts family member and they are interested in placement at facility.  CM will move forward with placement     AMPARO Frias CM  [phone]        INITIAL NOTE: KIMBERLY spoke with admin coordinator at [facility], via telephone regarding referral sent on 2/15/24, via epic.  KIMBERLY informed that facility is able to accept pt, and will initiate insurance auth today, however, facility will not have any beds until Monday.      KIMBERLY spoke with pts nephew: Victor Hugo Gomes: [phone], via telephone regarding pts acceptance to facility.  Victor Hugo reported that he would like to speak with physician before making final decision about placement.  Victor Hugo reported that he is planning to visit pt at bedside and once he speaks to physician then he would contact CM with his final decision. Victor Hugo is hopeful that pt can return home, with family assistance.    KIMBERLY will inform clinical team of the following.    AMPARO Frias CM  [phone]

## 2024-02-16 NOTE — WOUND CARE
Wound care nurse consult for possible Deep Tissue injury to nose and boggy heels.    89 y/o male admitted for debility.   Past Medical History:   Diagnosis Date    Chronic kidney disease     Dementia (HCC)     Diabetes (HCC)     Gastrointestinal disorder     Glaucoma     Gout     Hypercholesteremia     Hypertension     Other ill-defined conditions(799.89)     gout, glaucoma    Other ill-defined conditions(799.89)     high cholesterol    Seizures (HCC)     Possible seizure 3/6 following administration of fentanyl     Past Surgical History:   Procedure Laterality Date    ORTHOPEDIC SURGERY      cervical surgery     Patient does NOT have a DTPI on end of his nose. Patient has a scar that healed a darker color then the rest of his skin:      Patients bilateral heels are currently clean, dry and intact and without wounds.    JAMIA KRUGER RN, CWON

## 2024-02-17 LAB
GLUCOSE BLD STRIP.AUTO-MCNC: 119 MG/DL (ref 65–117)
GLUCOSE BLD STRIP.AUTO-MCNC: 151 MG/DL (ref 65–117)
GLUCOSE BLD STRIP.AUTO-MCNC: 249 MG/DL (ref 65–117)
GLUCOSE BLD STRIP.AUTO-MCNC: 82 MG/DL (ref 65–117)
SERVICE CMNT-IMP: ABNORMAL
SERVICE CMNT-IMP: NORMAL

## 2024-02-17 PROCEDURE — 6370000000 HC RX 637 (ALT 250 FOR IP): Performed by: STUDENT IN AN ORGANIZED HEALTH CARE EDUCATION/TRAINING PROGRAM

## 2024-02-17 PROCEDURE — 96372 THER/PROPH/DIAG INJ SC/IM: CPT

## 2024-02-17 PROCEDURE — 6370000000 HC RX 637 (ALT 250 FOR IP): Performed by: INTERNAL MEDICINE

## 2024-02-17 PROCEDURE — 82962 GLUCOSE BLOOD TEST: CPT

## 2024-02-17 PROCEDURE — 6360000002 HC RX W HCPCS: Performed by: STUDENT IN AN ORGANIZED HEALTH CARE EDUCATION/TRAINING PROGRAM

## 2024-02-17 PROCEDURE — 2580000003 HC RX 258: Performed by: STUDENT IN AN ORGANIZED HEALTH CARE EDUCATION/TRAINING PROGRAM

## 2024-02-17 PROCEDURE — G0378 HOSPITAL OBSERVATION PER HR: HCPCS

## 2024-02-17 RX ADMIN — GABAPENTIN 100 MG: 100 CAPSULE ORAL at 09:10

## 2024-02-17 RX ADMIN — SODIUM CHLORIDE, PRESERVATIVE FREE 10 ML: 5 INJECTION INTRAVENOUS at 21:20

## 2024-02-17 RX ADMIN — Medication: at 21:20

## 2024-02-17 RX ADMIN — AMLODIPINE BESYLATE 5 MG: 5 TABLET ORAL at 09:10

## 2024-02-17 RX ADMIN — INSULIN LISPRO 2 UNITS: 100 INJECTION, SOLUTION INTRAVENOUS; SUBCUTANEOUS at 14:38

## 2024-02-17 RX ADMIN — LATANOPROST 1 DROP: 50 SOLUTION OPHTHALMIC at 21:21

## 2024-02-17 RX ADMIN — ATORVASTATIN CALCIUM 20 MG: 20 TABLET, FILM COATED ORAL at 09:11

## 2024-02-17 RX ADMIN — SODIUM CHLORIDE, PRESERVATIVE FREE 10 ML: 5 INJECTION INTRAVENOUS at 09:11

## 2024-02-17 RX ADMIN — Medication: at 09:11

## 2024-02-17 RX ADMIN — ALLOPURINOL 100 MG: 100 TABLET ORAL at 09:11

## 2024-02-17 RX ADMIN — HEPARIN SODIUM 5000 UNITS: 5000 INJECTION INTRAVENOUS; SUBCUTANEOUS at 14:39

## 2024-02-17 RX ADMIN — HEPARIN SODIUM 5000 UNITS: 5000 INJECTION INTRAVENOUS; SUBCUTANEOUS at 06:58

## 2024-02-17 RX ADMIN — SODIUM BICARBONATE 1300 MG: 650 TABLET ORAL at 21:20

## 2024-02-17 RX ADMIN — HEPARIN SODIUM 5000 UNITS: 5000 INJECTION INTRAVENOUS; SUBCUTANEOUS at 21:23

## 2024-02-17 RX ADMIN — SODIUM BICARBONATE 1300 MG: 650 TABLET ORAL at 09:10

## 2024-02-17 RX ADMIN — INSULIN GLARGINE 10 UNITS: 100 INJECTION, SOLUTION SUBCUTANEOUS at 09:11

## 2024-02-17 RX ADMIN — ASPIRIN 81 MG: 81 TABLET, COATED ORAL at 09:10

## 2024-02-17 RX ADMIN — GABAPENTIN 100 MG: 100 CAPSULE ORAL at 21:20

## 2024-02-18 LAB
GLUCOSE BLD STRIP.AUTO-MCNC: 126 MG/DL (ref 65–117)
GLUCOSE BLD STRIP.AUTO-MCNC: 136 MG/DL (ref 65–117)
GLUCOSE BLD STRIP.AUTO-MCNC: 217 MG/DL (ref 65–117)
GLUCOSE BLD STRIP.AUTO-MCNC: 257 MG/DL (ref 65–117)
SERVICE CMNT-IMP: ABNORMAL

## 2024-02-18 PROCEDURE — 6360000002 HC RX W HCPCS: Performed by: STUDENT IN AN ORGANIZED HEALTH CARE EDUCATION/TRAINING PROGRAM

## 2024-02-18 PROCEDURE — 96372 THER/PROPH/DIAG INJ SC/IM: CPT

## 2024-02-18 PROCEDURE — 6370000000 HC RX 637 (ALT 250 FOR IP): Performed by: STUDENT IN AN ORGANIZED HEALTH CARE EDUCATION/TRAINING PROGRAM

## 2024-02-18 PROCEDURE — 2580000003 HC RX 258: Performed by: STUDENT IN AN ORGANIZED HEALTH CARE EDUCATION/TRAINING PROGRAM

## 2024-02-18 PROCEDURE — 82962 GLUCOSE BLOOD TEST: CPT

## 2024-02-18 PROCEDURE — G0378 HOSPITAL OBSERVATION PER HR: HCPCS

## 2024-02-18 PROCEDURE — 6370000000 HC RX 637 (ALT 250 FOR IP): Performed by: INTERNAL MEDICINE

## 2024-02-18 RX ADMIN — SODIUM CHLORIDE, PRESERVATIVE FREE 10 ML: 5 INJECTION INTRAVENOUS at 08:39

## 2024-02-18 RX ADMIN — INSULIN LISPRO 4 UNITS: 100 INJECTION, SOLUTION INTRAVENOUS; SUBCUTANEOUS at 13:58

## 2024-02-18 RX ADMIN — BUTALBITAL, ACETAMINOPHEN, AND CAFFEINE 1 TABLET: 50; 325; 40 TABLET ORAL at 21:13

## 2024-02-18 RX ADMIN — LATANOPROST 1 DROP: 50 SOLUTION OPHTHALMIC at 21:04

## 2024-02-18 RX ADMIN — GABAPENTIN 100 MG: 100 CAPSULE ORAL at 08:37

## 2024-02-18 RX ADMIN — GABAPENTIN 100 MG: 100 CAPSULE ORAL at 21:04

## 2024-02-18 RX ADMIN — AMLODIPINE BESYLATE 5 MG: 5 TABLET ORAL at 08:37

## 2024-02-18 RX ADMIN — SODIUM BICARBONATE 1300 MG: 650 TABLET ORAL at 08:37

## 2024-02-18 RX ADMIN — INSULIN GLARGINE 10 UNITS: 100 INJECTION, SOLUTION SUBCUTANEOUS at 08:38

## 2024-02-18 RX ADMIN — HEPARIN SODIUM 5000 UNITS: 5000 INJECTION INTRAVENOUS; SUBCUTANEOUS at 13:58

## 2024-02-18 RX ADMIN — Medication: at 21:04

## 2024-02-18 RX ADMIN — CALCITRIOL CAPSULES 0.25 MCG 0.25 MCG: 0.25 CAPSULE ORAL at 08:44

## 2024-02-18 RX ADMIN — Medication: at 08:38

## 2024-02-18 RX ADMIN — SODIUM CHLORIDE, PRESERVATIVE FREE 10 ML: 5 INJECTION INTRAVENOUS at 21:05

## 2024-02-18 RX ADMIN — HEPARIN SODIUM 5000 UNITS: 5000 INJECTION INTRAVENOUS; SUBCUTANEOUS at 06:23

## 2024-02-18 RX ADMIN — HEPARIN SODIUM 5000 UNITS: 5000 INJECTION INTRAVENOUS; SUBCUTANEOUS at 21:14

## 2024-02-18 RX ADMIN — ALLOPURINOL 100 MG: 100 TABLET ORAL at 08:37

## 2024-02-18 RX ADMIN — ATORVASTATIN CALCIUM 20 MG: 20 TABLET, FILM COATED ORAL at 08:38

## 2024-02-18 RX ADMIN — ASPIRIN 81 MG: 81 TABLET, COATED ORAL at 08:37

## 2024-02-18 ASSESSMENT — PAIN DESCRIPTION - DESCRIPTORS: DESCRIPTORS: ACHING;GNAWING

## 2024-02-18 ASSESSMENT — PAIN DESCRIPTION - ORIENTATION: ORIENTATION: LEFT

## 2024-02-18 ASSESSMENT — PAIN DESCRIPTION - LOCATION: LOCATION: HEAD

## 2024-02-18 ASSESSMENT — PAIN SCALES - GENERAL: PAINLEVEL_OUTOF10: 8

## 2024-02-19 VITALS
HEART RATE: 78 BPM | DIASTOLIC BLOOD PRESSURE: 81 MMHG | BODY MASS INDEX: 23.52 KG/M2 | OXYGEN SATURATION: 96 % | WEIGHT: 168 LBS | RESPIRATION RATE: 16 BRPM | HEIGHT: 71 IN | TEMPERATURE: 98.2 F | SYSTOLIC BLOOD PRESSURE: 111 MMHG

## 2024-02-19 LAB
ANION GAP SERPL CALC-SCNC: 6 MMOL/L (ref 5–15)
BASOPHILS # BLD: 0.1 K/UL (ref 0–0.1)
BASOPHILS NFR BLD: 1 % (ref 0–1)
BUN SERPL-MCNC: 54 MG/DL (ref 6–20)
BUN/CREAT SERPL: 20 (ref 12–20)
CALCIUM SERPL-MCNC: 9.5 MG/DL (ref 8.5–10.1)
CHLORIDE SERPL-SCNC: 105 MMOL/L (ref 97–108)
CO2 SERPL-SCNC: 26 MMOL/L (ref 21–32)
CREAT SERPL-MCNC: 2.71 MG/DL (ref 0.7–1.3)
DIFFERENTIAL METHOD BLD: ABNORMAL
EOSINOPHIL # BLD: 0.5 K/UL (ref 0–0.4)
EOSINOPHIL NFR BLD: 7 % (ref 0–7)
ERYTHROCYTE [DISTWIDTH] IN BLOOD BY AUTOMATED COUNT: 12.1 % (ref 11.5–14.5)
GLUCOSE BLD STRIP.AUTO-MCNC: 136 MG/DL (ref 65–117)
GLUCOSE BLD STRIP.AUTO-MCNC: 234 MG/DL (ref 65–117)
GLUCOSE SERPL-MCNC: 145 MG/DL (ref 65–100)
HCT VFR BLD AUTO: 38.4 % (ref 36.6–50.3)
HGB BLD-MCNC: 12.4 G/DL (ref 12.1–17)
IMM GRANULOCYTES # BLD AUTO: 0 K/UL (ref 0–0.04)
IMM GRANULOCYTES NFR BLD AUTO: 0 % (ref 0–0.5)
LYMPHOCYTES # BLD: 2.3 K/UL (ref 0.8–3.5)
LYMPHOCYTES NFR BLD: 34 % (ref 12–49)
MCH RBC QN AUTO: 30.2 PG (ref 26–34)
MCHC RBC AUTO-ENTMCNC: 32.3 G/DL (ref 30–36.5)
MCV RBC AUTO: 93.7 FL (ref 80–99)
MONOCYTES # BLD: 0.6 K/UL (ref 0–1)
MONOCYTES NFR BLD: 8 % (ref 5–13)
NEUTS SEG # BLD: 3.3 K/UL (ref 1.8–8)
NEUTS SEG NFR BLD: 50 % (ref 32–75)
NRBC # BLD: 0 K/UL (ref 0–0.01)
NRBC BLD-RTO: 0 PER 100 WBC
PLATELET # BLD AUTO: 156 K/UL (ref 150–400)
PMV BLD AUTO: 10.4 FL (ref 8.9–12.9)
POTASSIUM SERPL-SCNC: 4.9 MMOL/L (ref 3.5–5.1)
RBC # BLD AUTO: 4.1 M/UL (ref 4.1–5.7)
SERVICE CMNT-IMP: ABNORMAL
SERVICE CMNT-IMP: ABNORMAL
SODIUM SERPL-SCNC: 137 MMOL/L (ref 136–145)
WBC # BLD AUTO: 6.6 K/UL (ref 4.1–11.1)

## 2024-02-19 PROCEDURE — 36415 COLL VENOUS BLD VENIPUNCTURE: CPT

## 2024-02-19 PROCEDURE — 6370000000 HC RX 637 (ALT 250 FOR IP): Performed by: INTERNAL MEDICINE

## 2024-02-19 PROCEDURE — 80048 BASIC METABOLIC PNL TOTAL CA: CPT

## 2024-02-19 PROCEDURE — 2580000003 HC RX 258: Performed by: STUDENT IN AN ORGANIZED HEALTH CARE EDUCATION/TRAINING PROGRAM

## 2024-02-19 PROCEDURE — G0378 HOSPITAL OBSERVATION PER HR: HCPCS

## 2024-02-19 PROCEDURE — 96372 THER/PROPH/DIAG INJ SC/IM: CPT

## 2024-02-19 PROCEDURE — 82962 GLUCOSE BLOOD TEST: CPT

## 2024-02-19 PROCEDURE — 6370000000 HC RX 637 (ALT 250 FOR IP): Performed by: STUDENT IN AN ORGANIZED HEALTH CARE EDUCATION/TRAINING PROGRAM

## 2024-02-19 PROCEDURE — 6360000002 HC RX W HCPCS: Performed by: STUDENT IN AN ORGANIZED HEALTH CARE EDUCATION/TRAINING PROGRAM

## 2024-02-19 PROCEDURE — 85025 COMPLETE CBC W/AUTO DIFF WBC: CPT

## 2024-02-19 RX ORDER — SODIUM BICARBONATE 650 MG/1
650 TABLET ORAL 2 TIMES DAILY
Qty: 60 TABLET | Refills: 3 | Status: SHIPPED
Start: 2024-02-19

## 2024-02-19 RX ORDER — ACETAMINOPHEN 500 MG
500 TABLET ORAL 4 TIMES DAILY PRN
Qty: 120 TABLET | Refills: 5 | Status: SHIPPED
Start: 2024-02-19

## 2024-02-19 RX ORDER — INSULIN GLARGINE 100 [IU]/ML
14 INJECTION, SOLUTION SUBCUTANEOUS DAILY
Qty: 10 ML | Refills: 3 | Status: SHIPPED
Start: 2024-02-19

## 2024-02-19 RX ADMIN — GABAPENTIN 100 MG: 100 CAPSULE ORAL at 08:31

## 2024-02-19 RX ADMIN — ATORVASTATIN CALCIUM 20 MG: 20 TABLET, FILM COATED ORAL at 08:31

## 2024-02-19 RX ADMIN — Medication: at 08:32

## 2024-02-19 RX ADMIN — ASPIRIN 81 MG: 81 TABLET, COATED ORAL at 08:31

## 2024-02-19 RX ADMIN — AMLODIPINE BESYLATE 5 MG: 5 TABLET ORAL at 08:31

## 2024-02-19 RX ADMIN — ALLOPURINOL 100 MG: 100 TABLET ORAL at 08:31

## 2024-02-19 RX ADMIN — SODIUM CHLORIDE, PRESERVATIVE FREE 10 ML: 5 INJECTION INTRAVENOUS at 08:32

## 2024-02-19 RX ADMIN — INSULIN LISPRO 2 UNITS: 100 INJECTION, SOLUTION INTRAVENOUS; SUBCUTANEOUS at 13:29

## 2024-02-19 RX ADMIN — BUTALBITAL, ACETAMINOPHEN, AND CAFFEINE 1 TABLET: 50; 325; 40 TABLET ORAL at 06:26

## 2024-02-19 RX ADMIN — INSULIN GLARGINE 10 UNITS: 100 INJECTION, SOLUTION SUBCUTANEOUS at 08:31

## 2024-02-19 RX ADMIN — HEPARIN SODIUM 5000 UNITS: 5000 INJECTION INTRAVENOUS; SUBCUTANEOUS at 13:30

## 2024-02-19 RX ADMIN — ACETAMINOPHEN 650 MG: 325 TABLET ORAL at 08:31

## 2024-02-19 RX ADMIN — HEPARIN SODIUM 5000 UNITS: 5000 INJECTION INTRAVENOUS; SUBCUTANEOUS at 06:24

## 2024-02-19 ASSESSMENT — PAIN DESCRIPTION - ORIENTATION
ORIENTATION: LEFT
ORIENTATION: RIGHT;LEFT;ANTERIOR;POSTERIOR;UPPER;LOWER

## 2024-02-19 ASSESSMENT — PAIN DESCRIPTION - DESCRIPTORS
DESCRIPTORS: ACHING;STABBING
DESCRIPTORS: DISCOMFORT

## 2024-02-19 ASSESSMENT — PAIN DESCRIPTION - LOCATION
LOCATION: GENERALIZED
LOCATION: HEAD

## 2024-02-19 ASSESSMENT — PAIN SCALES - GENERAL: PAINLEVEL_OUTOF10: 9

## 2024-02-19 NOTE — CARE COORDINATION
Transition of Care Plan to SNF/Rehab    Communication to Patient/Family:  Met with patient and family and they are agreeable to the transition plan. The Plan for Transition of Care is related to the following treatment goals: SNF    The Patient and/or patient representative was provided with a choice of provider and agrees  with the discharge plan.      Yes [x] No []    A Freedom of choice list was provided with basic dialogue that supports the patient's individualized plan of care/goals and shares the quality data associated with the providers.       Yes [x] No []    SNF/Rehab Transition:  Patient has been accepted to [facility] SNF/Rehab and meets criteria for admission.   Patient will transported by Medicaid and expected to leave at request  time at 4 pm    Communication to SNF/Rehab:  Bedside RN, Cristobal, has been notified to update the transition plan to the facility and call report (phone number). [phone] room# 205R  Discharge information has been updated on the AVS. And communicated to facility via [employer], or CC link.     Opened CC Link      Nursing Please include all hard scripts for controlled substances, med rec and dc summary, and AVS in packet.     Reviewed and confirmed with facility, [facility], can manage the patient care needs for the following:     Shane with (X) only those applicable:  Medication:  [x]Medications are available at the facility  []IV Antibiotics    []Controlled Substance - hard copies available sent.  []Weekly Labs    Equipment:  []CPAP/BiPAP  []Wound Vacuum  []Lewis or Urinary Device  []PICC/Central Line  []Nebulizer  []Ventilator    Treatment:  []Isolation (for MRSA, VRE, etc.)  []Surgical Drain Management  []Tracheostomy Care  []Dressing Changes  []Dialysis with transportation  []PEG Care  []Oxygen  []Daily Weights for Heart Failure    Dietary:  []Any diet limitations  []Tube Feedings   []Total Parenteral Management (TPN)    Financial

## 2024-02-19 NOTE — PLAN OF CARE
Problem: Occupational Therapy - Adult  Goal: By Discharge: Performs self-care activities at highest level of function for planned discharge setting.  See evaluation for individualized goals.  Description: FUNCTIONAL STATUS PRIOR TO ADMISSION:  Poor historian, family was helping him patient states, per chart Patient was not ambulatory with family( nephew/grandson) carrying and giving full care for patient, up to total A even for feeding.  Receives Help From: Family, ADL Assistance: Needs assistance,  ,  ,  ,  ,  ,  , Ambulation Assistance: Needs assistance, Transfer Assistance: Needs assistance,       HOME SUPPORT: Patient lived family providing increased care with progressive decline with cognition and some agitation.    Occupational Therapy Goals:  Initiated 2/15/2024  1.  Patient will perform self-feeding with Set-up within 7 day(s).  2.  Patient will perform grooming with Moderate Assist within 7 day(s).  3.  Patient will perform upper body dressing with Moderate Assist within 7 day(s).  4.  Patient will perform toilet transfers with Minimal Assist  within 7 day(s).  5.  Patient will perform all aspects of toileting with Maximal Assist within 7 day(s).  6.  Patient will participate in upper extremity therapeutic exercise/activities with Minimal Assist for 5 minutes within 7 day(s).      Outcome: Progressing   OCCUPATIONAL THERAPY EVALUATION    Patient: Anton Gomes (89 y.o. male)  Date: 2/15/2024  Primary Diagnosis: Debility [R53.81]         Precautions:                    ASSESSMENT :  The patient is limited by decreased functional mobility, independence in ADLs, strength, activity tolerance, safety awareness, cognition, coordination, balance, increased pain levels, s/p admission with increased confusion and family unable to care for patient .  Overall oriented to self and location only, confused but agreeable, unsettled at times but able to rest with shallow breathing  noted     Based on the 
  Problem: Pain  Goal: Verbalizes/displays adequate comfort level or baseline comfort level  Outcome: HH/HSPC Resolved Met     Problem: Safety - Adult  Goal: Free from fall injury  Outcome: HH/HSPC Resolved Met     Problem: Chronic Conditions and Co-morbidities  Goal: Patient's chronic conditions and co-morbidity symptoms are monitored and maintained or improved  Outcome: HH/HSPC Resolved Met     Problem: Skin/Tissue Integrity  Goal: Absence of new skin breakdown  Description: 1.  Monitor for areas of redness and/or skin breakdown  2.  Assess vascular access sites hourly  3.  Every 4-6 hours minimum:  Change oxygen saturation probe site  4.  Every 4-6 hours:  If on nasal continuous positive airway pressure, respiratory therapy assess nares and determine need for appliance change or resting period.  Outcome: HH/HSPC Resolved Met     
  Problem: Pain  Goal: Verbalizes/displays adequate comfort level or baseline comfort level  Outcome: Progressing     
  Problem: Pain  Goal: Verbalizes/displays adequate comfort level or baseline comfort level  Outcome: Progressing     Problem: Safety - Adult  Goal: Free from fall injury  Outcome: Progressing     Problem: Chronic Conditions and Co-morbidities  Goal: Patient's chronic conditions and co-morbidity symptoms are monitored and maintained or improved  Outcome: Progressing     
training;Verbal cues  Rolling: Contact-guard assistance  Supine to Sit: Minimum assistance;Assist X2  Sit to Supine: Minimum assistance  Transfers:     Transfer Training  Transfer Training: Yes  Sit to Stand: Assist X2;Moderate assistance  Stand to Sit: Assist X2;Minimum assistance  Bed to Chair: Other (comment) (side step only to R, mod A x2)  Balance:               Balance  Sitting: Impaired  Sitting - Static: Good (unsupported)  Sitting - Dynamic: Fair (occasional)  Standing: Impaired  Standing - Static: Constant support;Poor  Standing - Dynamic: Constant support;Poor  Ambulation/Gait Training:                       Gait  Gait Training: No                                                                                                                                                                                                                                               [facility]®      Basic Mobility Inpatient Short Form (6-Clicks) Version 2  How much HELP from another person do you currently need... (If the patient hasn't done an activity recently, how much help from another person do you think they would need if they tried?) Total A Lot A Little None   1.  Turning from your back to your side while in a flat bed without using bedrails? []  1 []  2 []  3  [x]  4   2.  Moving from lying on your back to sitting on the side of a flat bed without using bedrails? []  1 []  2 [x]  3  []  4   3.  Moving to and from a bed to a chair (including a wheelchair)? []  1 [x]  2 []  3  []  4   4. Standing up from a chair using your arms (e.g. wheelchair or bedside chair)? []  1 [x]  2 []  3  []  4   5.  Walking in hospital room? [x]  1 []  2 []  3  []  4   6.  Climbing 3-5 steps with a railing? [x]  1 []  2 []  3  []  4     Raw Score: 13/24                            Cutoff score ?171,2,3 had higher odds of discharging home with home health or need of SNF/IPR.    1. Rachel Kelly, Barrington Carnes

## 2024-02-19 NOTE — PROGRESS NOTES
Hospitalist Progress Note    NAME:   Anton Gomes   : 10/6/1932   MRN: [id number]     Date/Time: 2024 5:24 PM  Patient PCP: Jason Ray MD    Estimated discharge date: stable for discharge   Barriers: placement       Assessment / Plan:    Failure to thrive POA  Care needs exceeding family's capacity  Dementia POA  Admit to medical observation  PT/OT consulted  pCXR with no ASD  UA 0-4 WBC, 0-5 RBC, negative bacteria  CM consult for placement  Ready for discharge in AM     Headache left sided POA  Prn fioricet   Head CT IMPRESSION:  No acute intracranial process.   Unchanged chronic right sphenoid sinus disease.  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    Essential hypertension POA  Hyperlipidemia  Continue ASA, norvasc, lipitor  PRN hydralazine    Stage 4 chronic kidney disease  Baseline creat 2.2 to 2.5 range  Estimated CrCl 23  Stable at baseline  Hold NaHCO3 till sure serum HCO3 is stable     Glaucoma  Chronic decreased vision left eye POA  Continue latanoprost eyedrops     Diabetes mellitus  Corrective coverage insulin  Accu-Cheks  Diabetic diet  Hypoglycemia protocol in place  Lantus 10 units daily  , 151, 126, 257, 136      Medical Decision Making:   I personally reviewed labs: Yes, as listed below  I personally reviewed imaging: CT head, CXR  Toxic drug monitoring: None  Discussed case with:     Code Status: DNR  DVT Prophylaxis: Heparin  GI Prophylaxis: Not indicated  Baseline: Debilitated    Subjective:     Chief Complaint / Reason for Physician Visit  Follow-up debility\".  Discussed with RN events overnight.   No acute issues  \"The headache is a little bit better\"  Mild left headache  No other complaints    Objective:     VITALS:   Last 24hrs VS reviewed since prior progress note. Most recent are:  Patient Vitals for the past 24 hrs:   BP Temp Temp src Pulse Resp SpO2   24 0844 115/72 97.5 °F (36.4 °C) Oral 71 16 100 %   24 
      Hospitalist Progress Note    NAME:   Anton Gomes   : 10/6/1932   MRN: [id number]     Date/Time: 2024 7:26 PM  Patient PCP: Jason Ray MD    Estimated discharge date: stable for discharge   Barriers: placement       Assessment / Plan:    Debility  Care needs exceeding family's capacity  Dementia  Admit to medical observation  PT/OT consulted  Case management consulted  Unable to contact patient's family at this time.  Reports he he is unsure why he was sent and simply saying that he just did not feel well and asked to do what his nephew who cares for him says.  Reports he believes they are tired of them and do not want to care for him anymore.  Sounds as though he has become progressively more debilitated now essentially bedbound requiring significant assistance to ambulate.     Headache  Prn fioricet      Essential hypertension  Hyperlipidemia  Resume meds, verified with pharmacy     Glaucoma  Continue listed latanoprost eyedrops     Diabetes mellitus  Corrective coverage insulin  Accu-Cheks  Diabetic diet  Hypoglycemia protocol in place      Medical Decision Making:   I personally reviewed labs: Yes, as listed below  I personally reviewed imaging: CT head, CXR  Toxic drug monitoring: None  Discussed case with:     Code Status: DNR  DVT Prophylaxis: Heparin  GI Prophylaxis: Not indicated  Baseline: Debilitated    Subjective:     Chief Complaint / Reason for Physician Visit  Follow-up debility\".  Discussed with RN events overnight.   No acute issues, complaining of headaches    Objective:     VITALS:   Last 24hrs VS reviewed since prior progress note. Most recent are:  Patient Vitals for the past 24 hrs:   BP Temp Temp src Pulse Resp SpO2   24 0908 114/86 97.5 °F (36.4 °C) Oral 74 14 92 %   24 (!) 148/83 97.5 °F (36.4 °C) Oral 64 -- 100 %           Intake/Output Summary (Last 24 hours) at 2024  Last data filed at 2024  Gross per 24 hour 
..End of Shift Note    Bedside shift change report given to JIGAR Davila (oncoming nurse) by Cristobal Ni RN (offgoing nurse).  Report included the following information SBAR, Kardex, Intake/Output, MAR, and Recent Results    Shift worked:  2460-9573     Shift summary and any significant changes:     Pt given prescribed meds per MAR. No PRN meds given. Caring rounds completed.          Cristobal Ni RN                            
..End of Shift Note    Bedside shift change report given to JIGAR Lopez (oncoming nurse) by Cristobal Ni RN (offgoing nurse).  Report included the following information SBAR, Kardex, Intake/Output, MAR, and Recent Results    Shift worked:  4531-9105     Shift summary and any significant changes:     Pt given prescribed meds per MAR. Pt eating his meals and stating he is hungry in between meals, eating snacks. IV patent. Pt had x2 BM. Caring rounds completed.        Cristobal Ni RN                            
..End of Shift Note    Bedside shift change report given to JIGAR Uriarte (oncoming nurse) by Cristobal Ni RN (offgoing nurse).  Report included the following information SBAR, Kardex, Intake/Output, MAR, and Recent Results    Shift worked:  0985-6183     Shift summary and any significant changes:     Pt given prescribed meds per MAR. Pt given PRN Fioricet x1 for headache. Labs drawn and sent to lab per provider order. Caring rounds completed.            Cristobal Ni RN                            
..I have reviewed discharge instructions with the patient. The patient verbalized understanding. Discharge medications reviewed with patient and appropriate educational materials and side effects teaching were provided. Follow-up appointments reviewed. Opportunity for questions and clarification was provided.  Venous access removed without difficulty.  Patient's belongings gathered and sent with patient. Patient is ready for discharge.     Cristobal Ni RN    
0645 Patient arrived to the floor. VS's were taken.  
Attempted to call report to [facility] twice. Left a message for the admission nurse with name and callback number to give report on pt.   
End of Shift Note    Bedside shift change report given to Cristobal KIMBALL (oncoming nurse) by Chapito Keyes RN (offgoing nurse).  Report included the following information SBAR, Kardex, and MAR    Shift worked: Night   Shift summary and any significant changes:    Had a calm night, only waking up to use the urinal. Given/took all his due meds during the shift and VS remain stable. NO lab orders.     Concerns for physician to address:       Zone phone for oncoming shift:            Chapito Keyes RN                            
End of Shift Note    Bedside shift change report given to Cristobal KIMBALL (oncoming nurse) by Jessica Klein RN (offgoing nurse).  Report included the following information SBAR, Kardex, MAR, and Recent Results    Shift worked:  7p-7a     Shift summary and any significant changes:     Patient tolerated care. Scheduled meds given and PRN Fioricet for a headache taht started to come on. Caring rounds completed. Labs drawn.       Expected LOS: 5  Actual LOS: 0      Jessica Klein RN                            
End of Shift Note    Bedside shift change report given to JIGAR Jain (oncoming nurse) by Kelsey Chin RN (offgoing nurse).  Report included the following information SBAR, Kardex, Intake/Output, and MAR    Shift worked:  7P-7A     Shift summary and any significant changes:     Scheduled medications were given, see MAR. PRN Fioricet was given for headache. IV line is flushed and patent.  Hygiene care was provided. Frequent rounding has been done.   Concerns for physician to address:       Zone phone for oncoming shift:          Kelsey Chin RN                            
Patient admitted overnight for placement, family unable to take care of the patient  Agree with H&P, patient seen and examined  Spoke with , SNF referrals pending  
Pharmacy Medication Reconciliation     The patient was interviewed regarding current PTA medication list, use and drug allergies. The patient was questioned regarding use of any other inhalers, topical products, over the counter medications, herbal medications, vitamin products or ophthalmic/nasal/otic medication use.     Allergy Update: Codeine and Fentanyl    Recommendations/Findings:   The following amendments were made to the patient's active medication list on file at [facility]:   1) Additions: fioricet, atorvastatin, calcitriol, amlodipine, gabapentin, sodium bicarb, aspirin, dorzolamide 2% eye drops    2) Deletions: cyanocobalamin, diclofenac gel, docusate, nexium, ferrous sulfate, fluoxetine, lidocaine patch, magnesium oxide, pregabalin, tramadol, trazodone     3) Changes:       Pertinent Findings: Medication bottles were in the patient room. Nurse is going to call grandson about taking these back home, as there are controls among the medications.    Clarified PTA med list with physical medication bottles and rx query. PTA medication list was corrected to the following:     Prior to Admission Medications   Prescriptions Last Dose Informant   allopurinol (ZYLOPRIM) 100 MG tablet     Sig: Take 1 tablet by mouth daily   amLODIPine (NORVASC) 5 MG tablet     Sig: Take 1 tablet by mouth daily   aspirin 81 MG EC tablet     Sig: Take 1 tablet by mouth daily   atorvastatin (LIPITOR) 20 MG tablet     Sig: Take 1 tablet by mouth daily   bimatoprost (LUMIGAN) 0.01 % SOLN ophthalmic drops     Sig: INSTILL 1 DROP INTO EACH EYE ONCE DAILY AT BEDTIME   butalbital-acetaminophen-caffeine (FIORICET, ESGIC) -40 MG per tablet     Sig: Take 1 tablet by mouth 3 times daily as needed for Headaches   calcitRIOL (ROCALTROL) 0.25 MCG capsule     Sig: Take 1 capsule by mouth every other day   dorzolamide (TRUSOPT) 2 % ophthalmic solution     Sig: Place 1 drop into both eyes 2 times daily   gabapentin (NEURONTIN) 100 MG capsule   
Spiritual Care Assessment/Progress Note  [facility]    Name: Anton Gomes MRN: [id number]    Age: 89 y.o.     Sex: male   Language: English     Date: 2/19/2024            Total Time Calculated: 13 min              Spiritual Assessment begun in [facility] MED[facility]COLOG[facility]  Service Provided For:: Patient  Referral/Consult From:: Rounding  Encounter Overview/Reason : Initial Encounter    Spiritual beliefs:      [x] Involved in a kd tradition/spiritual practice:      [] Supported by a kd community:      [] Claims no spiritual orientation:      [] Seeking spiritual identity:           [] Adheres to an individual form of spirituality:      [] Not able to assess:                Identified resources for coping and support system:   Support System: Children       [x] Prayer                  [] Devotional reading               [] Music                  [] Guided Imagery     [] Pet visits                                        [] Other: (COMMENT)     Specific area/focus of visit   Encounter:    Crisis:    Spiritual/Emotional needs: Type: Spiritual Support  Ritual, Rites and Sacraments:    Grief, Loss, and Adjustments:    Ethics/Mediation:    Behavioral Health:    Palliative Care:    Advance Care Planning:      Plan/Referrals: Continue to visit, (comment), Continue Support (comment)    Narrative:  reviewed the patient's chart prior to the visit. Mr. Gomes was in bed awake when the  entered his room.  provided a presence, encouragement and prayer. Mr. Gomes thanked the  for the visit.    Spiritual Health Services are available 24 hours a day as requested.     Rev. VIDAL Juárez  [facility]   Paging Service [phone])  
ml   Output 450 ml   Net -210 ml        I had a face to face encounter and independently examined this patient on 2/16/2024, as outlined below:  PHYSICAL EXAM:  General: Alert, cooperative  EENT:  EOMI. Anicteric sclerae.  Resp:  CTA bilaterally, no wheezing or rales.  No accessory muscle use  CV:  Regular  rhythm,  No edema  GI:  Soft, Non distended, Non tender.  +Bowel sounds  Neurologic:  Alert and oriented X 3, normal speech,   Psych:   Good insight. Not anxious nor agitated  Skin:  No rashes.  No jaundice    Reviewed most current lab test results and cultures  YES  Reviewed most current radiology test results   YES  Review and summation of old records today    NO  Reviewed patient's current orders and MAR    YES  PMH/SH reviewed - no change compared to H&P    Procedures: see electronic medical records for all procedures/Xrays and details which were not copied into this note but were reviewed prior to creation of Plan.      LABS:  I reviewed today's most current labs and imaging studies.  Pertinent labs include:  Recent Labs     02/14/24  1754 02/16/24  1015   WBC 8.0 6.6   HGB 12.6 12.6   HCT 38.0 38.3    154     Recent Labs     02/14/24  1754 02/16/24  1015    137   K 4.4 4.3    105   CO2 27 26   GLUCOSE 218* 200*   BUN 49* 38*   CREATININE 2.35* 2.27*   CALCIUM 9.7 9.4   LABALBU 4.2  --    BILITOT 0.3  --    AST 33  --    ALT 34  --        Signed: Giancarlo Douglas MD

## 2024-02-19 NOTE — DISCHARGE INSTRUCTIONS
HOSPITALIST DISCHARGE INSTRUCTIONS    NAME: Anton Gomes   :  10/6/1932   MRN:  [id number]     Date/Time:  2024 3:05 PM    ADMIT DATE: 2024     DISCHARGE DATE: 2024     Attending Physician: Asher Goodson Jr, MD      Medications: Per above medication reconciliation.    Pain Management: per above medications    Recommended diet: diabetic diet, dysphagia soft and bite sized    Recommended activity: activity as tolerated    Wound care: None    Indwelling devices:  None    Supplemental Oxygen: None    Required Lab work: Per SNF routine    Glucose management:  Accucheck ACHS with sliding scale per SNF protocol    Code status: DNR/DNI

## 2024-02-19 NOTE — DISCHARGE SUMMARY
insulin  Accu-Cheks  Diabetic diet  Hypoglycemia protocol in place  Lantus 10 units daily  , 151, 126, 257, 136      Medical Decision Making:   I personally reviewed labs: Yes, as listed below  I personally reviewed imaging: CT head, CXR  Toxic drug monitoring: None  Discussed case with:     Code Status: DNR/DNI  DVT Prophylaxis: Heparin  GI Prophylaxis: Not indicated  Baseline: Debilitated    Subjective:     Chief Complaint / Reason for Physician Visit  Follow-up debility\".  Discussed with RN events overnight.   No acute issues  \"The headache is a little bit better\"  Mild left headache  No other complaints    Objective:     VITALS:   Last 24hrs VS reviewed since prior progress note. Most recent are:  Patient Vitals for the past 24 hrs:   BP Temp Temp src Pulse Resp SpO2   02/19/24 0829 111/81 98.2 °F (36.8 °C) Oral 78 16 96 %   02/18/24 1938 119/75 98.4 °F (36.9 °C) Oral 70 16 100 %   02/18/24 1936 -- -- -- 76 -- 100 %           Intake/Output Summary (Last 24 hours) at 2/19/2024 1509  Last data filed at 2/19/2024 0811  Gross per 24 hour   Intake --   Output 600 ml   Net -600 ml          I had a face to face encounter and independently examined this patient on 2/19/2024, as outlined below:  PHYSICAL EXAM:  General: Alert, cooperative  EENT:  Anicteric sclerae.  Resp:  CTA bilaterally, no wheezing or rales.  No accessory muscle use  CV:  Regular  rhythm,  No edema  GI:  Soft, Non distended, Non tender.  +Bowel sounds  Neurologic:  Alert and oriented X 2, normal speech,   Psych:   Not anxious nor agitated  Skin:  No rashes.  No jaundice    Reviewed most current lab test results and cultures  YES  Reviewed most current radiology test results   YES  Review and summation of old records today    NO  Reviewed patient's current orders and MAR    YES  PMH/SH reviewed - no change compared to H&P    Procedures: see electronic medical records for all procedures/Xrays and details which were not copied into this

## 2024-02-23 ASSESSMENT — ENCOUNTER SYMPTOMS
SHORTNESS OF BREATH: 0
COUGH: 0
NAUSEA: 0
DIARRHEA: 0
VOMITING: 0

## 2024-04-21 ENCOUNTER — HOSPITAL ENCOUNTER (EMERGENCY)
Facility: HOSPITAL | Age: 89
Discharge: NURSING, REHAB OR LONG-TERM CARE | End: 2024-04-21
Attending: EMERGENCY MEDICINE
Payer: MEDICARE

## 2024-04-21 ENCOUNTER — APPOINTMENT (OUTPATIENT)
Facility: HOSPITAL | Age: 89
End: 2024-04-21
Payer: MEDICARE

## 2024-04-21 VITALS
HEIGHT: 71 IN | SYSTOLIC BLOOD PRESSURE: 100 MMHG | TEMPERATURE: 98.8 F | OXYGEN SATURATION: 98 % | RESPIRATION RATE: 16 BRPM | WEIGHT: 153.22 LBS | HEART RATE: 65 BPM | BODY MASS INDEX: 21.45 KG/M2 | DIASTOLIC BLOOD PRESSURE: 60 MMHG

## 2024-04-21 DIAGNOSIS — R40.4 TRANSIENT ALTERATION OF AWARENESS: Primary | ICD-10-CM

## 2024-04-21 LAB
ALBUMIN SERPL-MCNC: 3.9 G/DL (ref 3.5–5)
ALBUMIN/GLOB SERPL: 1.1 (ref 1.1–2.2)
ALP SERPL-CCNC: 139 U/L (ref 45–117)
ALT SERPL-CCNC: 26 U/L (ref 12–78)
ANION GAP SERPL CALC-SCNC: 4 MMOL/L (ref 5–15)
APPEARANCE UR: CLEAR
AST SERPL-CCNC: 27 U/L (ref 15–37)
BACTERIA URNS QL MICRO: NEGATIVE /HPF
BASOPHILS # BLD: 0 K/UL (ref 0–0.1)
BASOPHILS NFR BLD: 1 % (ref 0–1)
BILIRUB SERPL-MCNC: 0.4 MG/DL (ref 0.2–1)
BILIRUB UR QL: NEGATIVE
BUN SERPL-MCNC: 49 MG/DL (ref 6–20)
BUN/CREAT SERPL: 20 (ref 12–20)
CALCIUM SERPL-MCNC: 9.6 MG/DL (ref 8.5–10.1)
CHLORIDE SERPL-SCNC: 108 MMOL/L (ref 97–108)
CO2 SERPL-SCNC: 26 MMOL/L (ref 21–32)
COLOR UR: ABNORMAL
CREAT SERPL-MCNC: 2.45 MG/DL (ref 0.7–1.3)
DIFFERENTIAL METHOD BLD: ABNORMAL
EOSINOPHIL # BLD: 0.2 K/UL (ref 0–0.4)
EOSINOPHIL NFR BLD: 3 % (ref 0–7)
EPITH CASTS URNS QL MICRO: ABNORMAL /LPF
ERYTHROCYTE [DISTWIDTH] IN BLOOD BY AUTOMATED COUNT: 12.8 % (ref 11.5–14.5)
GLOBULIN SER CALC-MCNC: 3.5 G/DL (ref 2–4)
GLUCOSE SERPL-MCNC: 243 MG/DL (ref 65–100)
GLUCOSE UR STRIP.AUTO-MCNC: 250 MG/DL
HCT VFR BLD AUTO: 36.6 % (ref 36.6–50.3)
HGB BLD-MCNC: 11.9 G/DL (ref 12.1–17)
HGB UR QL STRIP: NEGATIVE
HYALINE CASTS URNS QL MICRO: ABNORMAL /LPF (ref 0–2)
IMM GRANULOCYTES # BLD AUTO: 0 K/UL (ref 0–0.04)
IMM GRANULOCYTES NFR BLD AUTO: 0 % (ref 0–0.5)
KETONES UR QL STRIP.AUTO: NEGATIVE MG/DL
LEUKOCYTE ESTERASE UR QL STRIP.AUTO: NEGATIVE
LYMPHOCYTES # BLD: 1.8 K/UL (ref 0.8–3.5)
LYMPHOCYTES NFR BLD: 25 % (ref 12–49)
MAGNESIUM SERPL-MCNC: 2 MG/DL (ref 1.6–2.4)
MCH RBC QN AUTO: 30.7 PG (ref 26–34)
MCHC RBC AUTO-ENTMCNC: 32.5 G/DL (ref 30–36.5)
MCV RBC AUTO: 94.6 FL (ref 80–99)
MONOCYTES # BLD: 0.6 K/UL (ref 0–1)
MONOCYTES NFR BLD: 8 % (ref 5–13)
NEUTS SEG # BLD: 4.5 K/UL (ref 1.8–8)
NEUTS SEG NFR BLD: 63 % (ref 32–75)
NITRITE UR QL STRIP.AUTO: NEGATIVE
NRBC # BLD: 0 K/UL (ref 0–0.01)
NRBC BLD-RTO: 0 PER 100 WBC
PH UR STRIP: 7 (ref 5–8)
PLATELET # BLD AUTO: 169 K/UL (ref 150–400)
PMV BLD AUTO: 9.9 FL (ref 8.9–12.9)
POTASSIUM SERPL-SCNC: 5.3 MMOL/L (ref 3.5–5.1)
PROT SERPL-MCNC: 7.4 G/DL (ref 6.4–8.2)
PROT UR STRIP-MCNC: NEGATIVE MG/DL
RBC # BLD AUTO: 3.87 M/UL (ref 4.1–5.7)
RBC #/AREA URNS HPF: ABNORMAL /HPF (ref 0–5)
SODIUM SERPL-SCNC: 138 MMOL/L (ref 136–145)
SP GR UR REFRACTOMETRY: 1.01
TROPONIN I SERPL HS-MCNC: 38 NG/L (ref 0–76)
URINE CULTURE IF INDICATED: ABNORMAL
UROBILINOGEN UR QL STRIP.AUTO: 0.2 EU/DL (ref 0.2–1)
WBC # BLD AUTO: 7.1 K/UL (ref 4.1–11.1)
WBC URNS QL MICRO: ABNORMAL /HPF (ref 0–4)

## 2024-04-21 PROCEDURE — 80053 COMPREHEN METABOLIC PANEL: CPT

## 2024-04-21 PROCEDURE — 36415 COLL VENOUS BLD VENIPUNCTURE: CPT

## 2024-04-21 PROCEDURE — 85025 COMPLETE CBC W/AUTO DIFF WBC: CPT

## 2024-04-21 PROCEDURE — 96360 HYDRATION IV INFUSION INIT: CPT

## 2024-04-21 PROCEDURE — 84484 ASSAY OF TROPONIN QUANT: CPT

## 2024-04-21 PROCEDURE — 96361 HYDRATE IV INFUSION ADD-ON: CPT

## 2024-04-21 PROCEDURE — 83735 ASSAY OF MAGNESIUM: CPT

## 2024-04-21 PROCEDURE — 71045 X-RAY EXAM CHEST 1 VIEW: CPT

## 2024-04-21 PROCEDURE — 93005 ELECTROCARDIOGRAM TRACING: CPT | Performed by: EMERGENCY MEDICINE

## 2024-04-21 PROCEDURE — 99285 EMERGENCY DEPT VISIT HI MDM: CPT

## 2024-04-21 PROCEDURE — 2580000003 HC RX 258: Performed by: EMERGENCY MEDICINE

## 2024-04-21 PROCEDURE — 81001 URINALYSIS AUTO W/SCOPE: CPT

## 2024-04-21 RX ORDER — 0.9 % SODIUM CHLORIDE 0.9 %
500 INTRAVENOUS SOLUTION INTRAVENOUS ONCE
Status: COMPLETED | OUTPATIENT
Start: 2024-04-21 | End: 2024-04-21

## 2024-04-21 RX ADMIN — SODIUM CHLORIDE 500 ML: 900 INJECTION, SOLUTION INTRAVENOUS at 10:03

## 2024-04-21 ASSESSMENT — PAIN SCALES - GENERAL: PAINLEVEL_OUTOF10: 0

## 2024-04-21 NOTE — ED NOTES
Verbal report given to nurse Carly with [facility] in pt's unit. Transport arranged by unit secretary Yomaira. Pt remains resting in bed quietly, no acute distress noted.

## 2024-04-21 NOTE — ED NOTES
BIBEMS from [facility] assisted living after staff noticed pt was more confused than his baseline this morning (staff did not report baseline to EMS). Staff reported to EMS that pt's family visited him yesterday and also seemed to think he was \"not quite himself\"  at that time. Pt alert to self, aware he is in hospital. Uncertain of year, why he is in hospital. Hx dementia per papers from facility.    Pt denies pain, NVD, dysuria, cough, fever, recent illness.

## 2024-04-21 NOTE — ED NOTES
Pt transferred to [facility] stretcher, report provided to [facility] team. Pt alert to ED arrival baseline, breathing non-labored.

## 2024-04-21 NOTE — DISCHARGE INSTRUCTIONS
Lab work all unremarkable including CBC CMP magnesium troponin as well as a urinalysis.  Chest x-ray unremarkable.  No identifiable cause to the patient's intermittent confusion.

## 2024-04-21 NOTE — ED PROVIDER NOTES
hospitals EMERGENCY DEPT  EMERGENCY DEPARTMENT ENCOUNTER       Pt Name: Anton Gomes  MRN: [id number]  Birthdate 10/6/1932  Date of evaluation: 4/21/2024  Provider: Dylan Gaona DO   PCP: Jason Ray MD  Note Started: 8:54 AM EDT 4/21/24     CHIEF COMPLAINT       Chief Complaint   Patient presents with    Altered Mental Status     BIBEMS from [facility] assisted living after staff noticed pt was more confused than his baseline this morning; staff reported to EMS that pt's family visited him yesterday and also seemed to think he was \"not quite himself\"; pt alert to self, aware he is in hospital        HISTORY OF PRESENT ILLNESS: 1 or more elements      History From: patient, History limited by: none     Anton Gomes is a 89 y.o. male presents to the emergency department by EMS from [facility] secondary to change in mental status.       Please See MDM for Additional Details of the HPI/PMH  Nursing Notes were all reviewed and agreed with or any disagreements were addressed in the HPI.     REVIEW OF SYSTEMS        Positives and Pertinent negatives as per HPI.    PAST HISTORY     Past Medical History:  Past Medical History:   Diagnosis Date    Chronic kidney disease     Dementia (HCC)     Diabetes (HCC)     Gastrointestinal disorder     Glaucoma     Gout     Hypercholesteremia     Hypertension     Other ill-defined conditions(799.89)     gout, glaucoma    Other ill-defined conditions(799.89)     high cholesterol    Seizures (HCC)     Possible seizure 3/6 following administration of fentanyl       Past Surgical History:  Past Surgical History:   Procedure Laterality Date    ORTHOPEDIC SURGERY      cervical surgery       Family History:  No family history on file.    Social History:  Social History     Tobacco Use    Smoking status: Never    Smokeless tobacco: Never   Substance Use Topics    Alcohol use: No    Drug use: No       Allergies:  Allergies   Allergen Reactions    Codeine Itching    Fentanyl Other

## 2024-04-24 LAB
EKG ATRIAL RATE: 78 BPM
EKG DIAGNOSIS: NORMAL
EKG Q-T INTERVAL: 368 MS
EKG QRS DURATION: 66 MS
EKG QTC CALCULATION (BAZETT): 419 MS
EKG R AXIS: 39 DEGREES
EKG T AXIS: 42 DEGREES
EKG VENTRICULAR RATE: 78 BPM

## 2024-07-02 ENCOUNTER — APPOINTMENT (OUTPATIENT)
Facility: HOSPITAL | Age: 89
DRG: 069 | End: 2024-07-02
Payer: MEDICARE

## 2024-07-02 ENCOUNTER — HOSPITAL ENCOUNTER (INPATIENT)
Facility: HOSPITAL | Age: 89
LOS: 7 days | Discharge: NURSING, REHAB OR LONG-TERM CARE | DRG: 069 | End: 2024-07-09
Attending: EMERGENCY MEDICINE | Admitting: INTERNAL MEDICINE
Payer: MEDICARE

## 2024-07-02 DIAGNOSIS — R29.90 STROKE-LIKE SYMPTOMS: ICD-10-CM

## 2024-07-02 DIAGNOSIS — R29.810 FACIAL DROOP: ICD-10-CM

## 2024-07-02 DIAGNOSIS — R41.82 ALTERED MENTAL STATUS, UNSPECIFIED ALTERED MENTAL STATUS TYPE: ICD-10-CM

## 2024-07-02 DIAGNOSIS — R47.1 DYSARTHRIA: Primary | ICD-10-CM

## 2024-07-02 LAB
ALBUMIN SERPL-MCNC: 4.1 G/DL (ref 3.5–5)
ALBUMIN/GLOB SERPL: 0.9 (ref 1.1–2.2)
ALP SERPL-CCNC: 139 U/L (ref 45–117)
ALT SERPL-CCNC: 31 U/L (ref 12–78)
ANION GAP SERPL CALC-SCNC: 5 MMOL/L (ref 5–15)
APPEARANCE UR: CLEAR
AST SERPL-CCNC: 30 U/L (ref 15–37)
BACTERIA URNS QL MICRO: NEGATIVE /HPF
BASOPHILS # BLD: 0 K/UL (ref 0–0.1)
BASOPHILS NFR BLD: 1 % (ref 0–1)
BILIRUB SERPL-MCNC: 0.4 MG/DL (ref 0.2–1)
BILIRUB UR QL: NEGATIVE
BUN SERPL-MCNC: 46 MG/DL (ref 6–20)
BUN/CREAT SERPL: 17 (ref 12–20)
CALCIUM SERPL-MCNC: 10.1 MG/DL (ref 8.5–10.1)
CHLORIDE SERPL-SCNC: 109 MMOL/L (ref 97–108)
CO2 SERPL-SCNC: 26 MMOL/L (ref 21–32)
COLOR UR: NORMAL
COMMENT:: NORMAL
CREAT SERPL-MCNC: 2.77 MG/DL (ref 0.7–1.3)
DIFFERENTIAL METHOD BLD: ABNORMAL
EOSINOPHIL # BLD: 0.4 K/UL (ref 0–0.4)
EOSINOPHIL NFR BLD: 5 % (ref 0–7)
EPITH CASTS URNS QL MICRO: NORMAL /LPF
ERYTHROCYTE [DISTWIDTH] IN BLOOD BY AUTOMATED COUNT: 12.3 % (ref 11.5–14.5)
GLOBULIN SER CALC-MCNC: 4.4 G/DL (ref 2–4)
GLUCOSE BLD STRIP.AUTO-MCNC: 145 MG/DL (ref 65–117)
GLUCOSE BLD STRIP.AUTO-MCNC: 158 MG/DL (ref 65–117)
GLUCOSE BLD STRIP.AUTO-MCNC: 188 MG/DL (ref 65–117)
GLUCOSE SERPL-MCNC: 184 MG/DL (ref 65–100)
GLUCOSE UR STRIP.AUTO-MCNC: NEGATIVE MG/DL
HCT VFR BLD AUTO: 37.2 % (ref 36.6–50.3)
HGB BLD-MCNC: 11.9 G/DL (ref 12.1–17)
HGB UR QL STRIP: NEGATIVE
HYALINE CASTS URNS QL MICRO: NORMAL /LPF (ref 0–2)
IMM GRANULOCYTES # BLD AUTO: 0 K/UL (ref 0–0.04)
IMM GRANULOCYTES NFR BLD AUTO: 0 % (ref 0–0.5)
INR PPP: 1.2 (ref 0.9–1.1)
KETONES UR QL STRIP.AUTO: NEGATIVE MG/DL
LACTATE SERPL-SCNC: 1 MMOL/L (ref 0.4–2)
LEUKOCYTE ESTERASE UR QL STRIP.AUTO: NEGATIVE
LYMPHOCYTES # BLD: 1.9 K/UL (ref 0.8–3.5)
LYMPHOCYTES NFR BLD: 23 % (ref 12–49)
MCH RBC QN AUTO: 30.7 PG (ref 26–34)
MCHC RBC AUTO-ENTMCNC: 32 G/DL (ref 30–36.5)
MCV RBC AUTO: 96.1 FL (ref 80–99)
MONOCYTES # BLD: 0.6 K/UL (ref 0–1)
MONOCYTES NFR BLD: 8 % (ref 5–13)
NEUTS SEG # BLD: 5.2 K/UL (ref 1.8–8)
NEUTS SEG NFR BLD: 63 % (ref 32–75)
NITRITE UR QL STRIP.AUTO: NEGATIVE
NRBC # BLD: 0 K/UL (ref 0–0.01)
NRBC BLD-RTO: 0 PER 100 WBC
PH UR STRIP: 7 (ref 5–8)
PLATELET # BLD AUTO: 171 K/UL (ref 150–400)
PMV BLD AUTO: 9.8 FL (ref 8.9–12.9)
POTASSIUM SERPL-SCNC: 5.1 MMOL/L (ref 3.5–5.1)
PROT SERPL-MCNC: 8.5 G/DL (ref 6.4–8.2)
PROT UR STRIP-MCNC: NEGATIVE MG/DL
PROTHROMBIN TIME: 12.3 SEC (ref 9–11.1)
RBC # BLD AUTO: 3.87 M/UL (ref 4.1–5.7)
RBC #/AREA URNS HPF: NORMAL /HPF (ref 0–5)
SERVICE CMNT-IMP: ABNORMAL
SODIUM SERPL-SCNC: 140 MMOL/L (ref 136–145)
SP GR UR REFRACTOMETRY: 1.02
SPECIMEN HOLD: NORMAL
TROPONIN I SERPL HS-MCNC: 28 NG/L (ref 0–76)
URINE CULTURE IF INDICATED: NORMAL
UROBILINOGEN UR QL STRIP.AUTO: 0.2 EU/DL (ref 0.2–1)
WBC # BLD AUTO: 8.2 K/UL (ref 4.1–11.1)
WBC URNS QL MICRO: NORMAL /HPF (ref 0–4)

## 2024-07-02 PROCEDURE — 70496 CT ANGIOGRAPHY HEAD: CPT

## 2024-07-02 PROCEDURE — 70498 CT ANGIOGRAPHY NECK: CPT

## 2024-07-02 PROCEDURE — 80053 COMPREHEN METABOLIC PANEL: CPT

## 2024-07-02 PROCEDURE — 99285 EMERGENCY DEPT VISIT HI MDM: CPT

## 2024-07-02 PROCEDURE — 85025 COMPLETE CBC W/AUTO DIFF WBC: CPT

## 2024-07-02 PROCEDURE — 6360000002 HC RX W HCPCS: Performed by: INTERNAL MEDICINE

## 2024-07-02 PROCEDURE — 36415 COLL VENOUS BLD VENIPUNCTURE: CPT

## 2024-07-02 PROCEDURE — 2580000003 HC RX 258: Performed by: INTERNAL MEDICINE

## 2024-07-02 PROCEDURE — 85610 PROTHROMBIN TIME: CPT

## 2024-07-02 PROCEDURE — 1100000003 HC PRIVATE W/ TELEMETRY

## 2024-07-02 PROCEDURE — 6370000000 HC RX 637 (ALT 250 FOR IP): Performed by: INTERNAL MEDICINE

## 2024-07-02 PROCEDURE — 82962 GLUCOSE BLOOD TEST: CPT

## 2024-07-02 PROCEDURE — 83605 ASSAY OF LACTIC ACID: CPT

## 2024-07-02 PROCEDURE — 81001 URINALYSIS AUTO W/SCOPE: CPT

## 2024-07-02 PROCEDURE — 84484 ASSAY OF TROPONIN QUANT: CPT

## 2024-07-02 PROCEDURE — 4A03X5D MEASUREMENT OF ARTERIAL FLOW, INTRACRANIAL, EXTERNAL APPROACH: ICD-10-PCS | Performed by: STUDENT IN AN ORGANIZED HEALTH CARE EDUCATION/TRAINING PROGRAM

## 2024-07-02 PROCEDURE — 93005 ELECTROCARDIOGRAM TRACING: CPT | Performed by: EMERGENCY MEDICINE

## 2024-07-02 PROCEDURE — 0042T CT BRAIN PERFUSION: CPT

## 2024-07-02 PROCEDURE — 70450 CT HEAD/BRAIN W/O DYE: CPT

## 2024-07-02 PROCEDURE — 6360000004 HC RX CONTRAST MEDICATION: Performed by: EMERGENCY MEDICINE

## 2024-07-02 RX ORDER — GLUCAGON INJECTION, SOLUTION 1 MG/.2ML
1 INJECTION, SOLUTION SUBCUTANEOUS
COMMUNITY

## 2024-07-02 RX ORDER — ATORVASTATIN CALCIUM 40 MG/1
80 TABLET, FILM COATED ORAL NIGHTLY
Status: DISCONTINUED | OUTPATIENT
Start: 2024-07-02 | End: 2024-07-03

## 2024-07-02 RX ORDER — INSULIN LISPRO 100 [IU]/ML
0-8 INJECTION, SOLUTION INTRAVENOUS; SUBCUTANEOUS
Status: DISCONTINUED | OUTPATIENT
Start: 2024-07-02 | End: 2024-07-09 | Stop reason: HOSPADM

## 2024-07-02 RX ORDER — INSULIN LISPRO 100 [IU]/ML
0-4 INJECTION, SOLUTION INTRAVENOUS; SUBCUTANEOUS NIGHTLY
Status: DISCONTINUED | OUTPATIENT
Start: 2024-07-02 | End: 2024-07-09 | Stop reason: HOSPADM

## 2024-07-02 RX ORDER — LORAZEPAM 2 MG/ML
1 INJECTION INTRAMUSCULAR EVERY 4 HOURS PRN
Status: DISCONTINUED | OUTPATIENT
Start: 2024-07-02 | End: 2024-07-09 | Stop reason: HOSPADM

## 2024-07-02 RX ORDER — DEXTROSE MONOHYDRATE 100 MG/ML
INJECTION, SOLUTION INTRAVENOUS CONTINUOUS PRN
Status: DISCONTINUED | OUTPATIENT
Start: 2024-07-02 | End: 2024-07-09 | Stop reason: HOSPADM

## 2024-07-02 RX ORDER — INSULIN LISPRO 100 [IU]/ML
INJECTION, SOLUTION INTRAVENOUS; SUBCUTANEOUS
COMMUNITY

## 2024-07-02 RX ORDER — SENNA AND DOCUSATE SODIUM 50; 8.6 MG/1; MG/1
1 TABLET, FILM COATED ORAL DAILY
COMMUNITY

## 2024-07-02 RX ORDER — CETIRIZINE HYDROCHLORIDE 10 MG/1
10 TABLET ORAL DAILY
COMMUNITY

## 2024-07-02 RX ORDER — SODIUM CHLORIDE 0.9 % (FLUSH) 0.9 %
5-40 SYRINGE (ML) INJECTION EVERY 12 HOURS SCHEDULED
Status: DISCONTINUED | OUTPATIENT
Start: 2024-07-02 | End: 2024-07-09 | Stop reason: HOSPADM

## 2024-07-02 RX ORDER — GLUCAGON 1 MG/ML
1 KIT INJECTION PRN
Status: DISCONTINUED | OUTPATIENT
Start: 2024-07-02 | End: 2024-07-09 | Stop reason: HOSPADM

## 2024-07-02 RX ORDER — INSULIN LISPRO 100 [IU]/ML
INJECTION, SOLUTION INTRAVENOUS; SUBCUTANEOUS
COMMUNITY
End: 2024-07-02 | Stop reason: ALTCHOICE

## 2024-07-02 RX ORDER — SODIUM CHLORIDE 0.9 % (FLUSH) 0.9 %
5-40 SYRINGE (ML) INJECTION PRN
Status: DISCONTINUED | OUTPATIENT
Start: 2024-07-02 | End: 2024-07-09 | Stop reason: HOSPADM

## 2024-07-02 RX ORDER — SODIUM CHLORIDE 9 MG/ML
INJECTION, SOLUTION INTRAVENOUS PRN
Status: DISCONTINUED | OUTPATIENT
Start: 2024-07-02 | End: 2024-07-09 | Stop reason: HOSPADM

## 2024-07-02 RX ORDER — LATANOPROST 50 UG/ML
1 SOLUTION/ DROPS OPHTHALMIC NIGHTLY
COMMUNITY

## 2024-07-02 RX ORDER — ENOXAPARIN SODIUM 100 MG/ML
30 INJECTION SUBCUTANEOUS DAILY
Status: DISCONTINUED | OUTPATIENT
Start: 2024-07-02 | End: 2024-07-09 | Stop reason: HOSPADM

## 2024-07-02 RX ORDER — POLYETHYLENE GLYCOL 3350 17 G/17G
17 POWDER, FOR SOLUTION ORAL DAILY PRN
Status: DISCONTINUED | OUTPATIENT
Start: 2024-07-02 | End: 2024-07-09 | Stop reason: HOSPADM

## 2024-07-02 RX ORDER — ENEMA 19; 7 G/133ML; G/133ML
1 ENEMA RECTAL
COMMUNITY

## 2024-07-02 RX ORDER — BISACODYL 10 MG
10 SUPPOSITORY, RECTAL RECTAL DAILY PRN
COMMUNITY

## 2024-07-02 RX ORDER — ASPIRIN 81 MG/1
81 TABLET, CHEWABLE ORAL DAILY
Status: DISCONTINUED | OUTPATIENT
Start: 2024-07-02 | End: 2024-07-09 | Stop reason: HOSPADM

## 2024-07-02 RX ORDER — ACETAMINOPHEN 325 MG/1
650 TABLET ORAL EVERY 6 HOURS PRN
Status: DISCONTINUED | OUTPATIENT
Start: 2024-07-02 | End: 2024-07-09 | Stop reason: HOSPADM

## 2024-07-02 RX ORDER — ONDANSETRON 4 MG/1
4 TABLET, ORALLY DISINTEGRATING ORAL EVERY 8 HOURS PRN
Status: DISCONTINUED | OUTPATIENT
Start: 2024-07-02 | End: 2024-07-09 | Stop reason: HOSPADM

## 2024-07-02 RX ORDER — ONDANSETRON 2 MG/ML
4 INJECTION INTRAMUSCULAR; INTRAVENOUS EVERY 4 HOURS PRN
Status: DISCONTINUED | OUTPATIENT
Start: 2024-07-02 | End: 2024-07-09 | Stop reason: HOSPADM

## 2024-07-02 RX ORDER — ONDANSETRON 2 MG/ML
4 INJECTION INTRAMUSCULAR; INTRAVENOUS EVERY 6 HOURS PRN
Status: DISCONTINUED | OUTPATIENT
Start: 2024-07-02 | End: 2024-07-09 | Stop reason: HOSPADM

## 2024-07-02 RX ORDER — ASPIRIN 300 MG/1
300 SUPPOSITORY RECTAL DAILY
Status: DISCONTINUED | OUTPATIENT
Start: 2024-07-02 | End: 2024-07-03

## 2024-07-02 RX ORDER — SODIUM CHLORIDE 9 MG/ML
INJECTION, SOLUTION INTRAVENOUS CONTINUOUS
Status: DISCONTINUED | OUTPATIENT
Start: 2024-07-02 | End: 2024-07-03

## 2024-07-02 RX ADMIN — ENOXAPARIN SODIUM 30 MG: 100 INJECTION SUBCUTANEOUS at 16:48

## 2024-07-02 RX ADMIN — IOPAMIDOL 100 ML: 755 INJECTION, SOLUTION INTRAVENOUS at 11:48

## 2024-07-02 RX ADMIN — SODIUM CHLORIDE: 9 INJECTION, SOLUTION INTRAVENOUS at 16:56

## 2024-07-02 RX ADMIN — ASPIRIN 300 MG: 300 SUPPOSITORY RECTAL at 16:48

## 2024-07-02 ASSESSMENT — LIFESTYLE VARIABLES
HOW MANY STANDARD DRINKS CONTAINING ALCOHOL DO YOU HAVE ON A TYPICAL DAY: PATIENT UNABLE TO ANSWER
HOW OFTEN DO YOU HAVE A DRINK CONTAINING ALCOHOL: PATIENT UNABLE TO ANSWER

## 2024-07-02 NOTE — PROGRESS NOTES
Verbal report received from JIGAR Moffett Report included the following information SBAR, Kardex, ED Summary, MAR, and Recent Results. This RN verbalized understanding of plan of care with opportunity for clarification and questions.         Bedside shift report given to JIGAR Hoang. Report included the following information Nurse Handoff Report, MAR, Telemetry status, Recent Results, and plan of care. Oncoming RN verbalized understanding of plan of care with opportunity for clarification and questions. Dual skin check performed at this time.

## 2024-07-02 NOTE — ED NOTES
1121: At doc box, level 2 paged overhead  1122: Pt moved to ED14 for IV access and vitals given EMS was unable to obtain O2.   1124: Tele neuro paged and CT called  1134: To CT  1137: CT start  1147: CT end  1151: Tele neuro on screen at bedside

## 2024-07-02 NOTE — PROGRESS NOTES
MRI PENDING    Completion of MRI Screening Sheet    Fax to [phone] when completed    Please call [phone]  When this is done    Thank You

## 2024-07-02 NOTE — PROGRESS NOTES
Patient is from [facility] care spoke with Yesica faxing his papers, but they were given to EMS.  Current medications were given this morning.  He is a full code.

## 2024-07-02 NOTE — ED PROVIDER NOTES
[facility] EMERGENCY DEPT  EMERGENCY DEPARTMENT ENCOUNTER    Patient Name: Anton Gomes  MRN: [id number]  YOB: 1932  Provider: David Peres MD  PCP: Jason Ray MD  Time/Date of evaluation: 11:23 AM EDT on 7/2/24    History of Presenting Illness     Chief Complaint   Patient presents with    Altered Mental Status     Pt BIBEMS from facility who stated pt who is normally A&Ox4 has a LKW of 1500 yesterday and a new L facial droop \"that started 1 hour ago\". Family at facility stated this was also not pt's baseline. BGL of 203     History Provided by: EMS and Patient   History is limited by: Acuity of Condition    HISTORY (Narrative):   Anton Gomes is a 89 y.o. male with a PMHX of chronic kidney disease, dementia, diabetes, hypercholesterolemia, and questionable seizures who presents to the emergency department (room 14) by EMS C/O left-sided facial droop, altered mental status, and worsening confusion.  Patient was last seen normal around 330 yesterday afternoon.    Nursing Notes were all reviewed and agreed with or any disagreements were addressed in the HPI.    Past History     PAST MEDICAL HISTORY:  Past Medical History:   Diagnosis Date    Chronic kidney disease     Dementia (HCC)     Diabetes (HCC)     Gastrointestinal disorder     Glaucoma     Gout     Hypercholesteremia     Hypertension     Other ill-defined conditions(799.89)     gout, glaucoma    Other ill-defined conditions(799.89)     high cholesterol    Seizures (HCC)     Possible seizure 3/6 following administration of fentanyl       PAST SURGICAL HISTORY:  Past Surgical History:   Procedure Laterality Date    ORTHOPEDIC SURGERY      cervical surgery       FAMILY HISTORY:  No family history on file.    SOCIAL HISTORY:  Social History     Tobacco Use    Smoking status: Never    Smokeless tobacco: Never   Substance Use Topics    Alcohol use: No    Drug use: No       MEDICATIONS:  No current facility-administered

## 2024-07-02 NOTE — PLAN OF CARE
Problem: Discharge Planning  Goal: Discharge to home or other facility with appropriate resources  7/2/2024 1852 by Florencia Mcknight RN  Outcome: Progressing  7/2/2024 1851 by Florencia Mcknight RN  Outcome: Progressing     Problem: Pain  Goal: Verbalizes/displays adequate comfort level or baseline comfort level  7/2/2024 1852 by Florencia Mcknight RN  Outcome: Progressing  7/2/2024 1851 by Florencia Mcknight RN  Outcome: Progressing     Problem: Skin/Tissue Integrity  Goal: Absence of new skin breakdown  Description: 1.  Monitor for areas of redness and/or skin breakdown  2.  Assess vascular access sites hourly  3.  Every 4-6 hours minimum:  Change oxygen saturation probe site  4.  Every 4-6 hours:  If on nasal continuous positive airway pressure, respiratory therapy assess nares and determine need for appliance change or resting period.  7/2/2024 1852 by Florencia Mcknight RN  Outcome: Progressing  7/2/2024 1851 by Florencia Mcknight RN  Outcome: Progressing     Problem: ABCDS Injury Assessment  Goal: Absence of physical injury  7/2/2024 1852 by Florencia Mcknight RN  Outcome: Progressing  7/2/2024 1851 by Florencia Mcknight RN  Outcome: Progressing     Problem: Safety - Adult  Goal: Free from fall injury  7/2/2024 1852 by Florencia Mcknight RN  Outcome: Progressing  7/2/2024 1851 by Florencia Mcknight RN  Outcome: Progressing     Problem: Neurosensory - Adult  Goal: Achieves stable or improved neurological status  Outcome: Progressing  Goal: Absence of seizures  Outcome: Progressing  Goal: Remains free of injury related to seizures activity  Outcome: Progressing  Goal: Achieves maximal functionality and self care  Outcome: Progressing
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
## 2024-07-02 NOTE — PROGRESS NOTES
Admission Medication History Technician Note:    Hemodialysis patient: None    Patient preferred pharmacy Confirmed:    [facility] [place] [place] - [address] - P [phone] - F [phone]  [address]  [address]  [facility] [address]  Phone: [phone] Fax: [phone]      Information obtained from¹: Rx Query, Medication List, and [facility]    Does patient manage their medications: no    Comments/Recommendations: Updated PTA meds/reviewed patient's allergies.    1)  Medication issues identified: none    2)  Medication changes (since last review):  Added  + ins lispro  + Latanoprost  + milk of mag  + Senna-docusate  + artificial tears  + Zyrtec  + Bisacodyl sup  + Enema  + Gvoke PFS  + Insta-Glucose    Removed  - Lumigan      Adjusted  Gabapentin: Old: 100mg twice a day -->New: 300mg three times a day      3)  Pertinent Pharmacy Findings:  Identified High Alert Medication Information  Current Anticoagulants Aspirin     ¹RxQuery pharmacy benefit data reflects medications filled and processed through the patient's insurance, however this data does NOT capture whether the medication was picked up or is currently being taken by the patient.    Allergies:  Codeine and Fentanyl    Chief Complaint for this Admission:    Chief Complaint   Patient presents with    Altered Mental Status     Pt BIBEMS from facility who stated pt who is normally A&Ox4 has a LKW of 1500 yesterday and a new L facial droop \"that started 1 hour ago\". Family at facility stated this was also not pt's baseline. BGL of 203     Prior to Admission Medications:   Current Outpatient Medications   Medication Instructions    acetaminophen (TYLENOL) 500 mg, Oral, 4 TIMES DAILY PRN    allopurinol (ZYLOPRIM) 100 MG tablet 1 tablet, Oral, DAILY    amLODIPine (NORVASC) 5 mg, Oral, DAILY    aspirin 81 mg, Oral, DAILY    atorvastatin (LIPITOR) 20 mg, Oral, Nightly    bisacodyl (DULCOLAX) 10 mg, Rectal, DAILY PRN

## 2024-07-03 ENCOUNTER — APPOINTMENT (OUTPATIENT)
Facility: HOSPITAL | Age: 89
DRG: 069 | End: 2024-07-03
Attending: INTERNAL MEDICINE
Payer: MEDICARE

## 2024-07-03 ENCOUNTER — APPOINTMENT (OUTPATIENT)
Facility: HOSPITAL | Age: 89
DRG: 069 | End: 2024-07-03
Payer: MEDICARE

## 2024-07-03 PROBLEM — R41.82 ACUTE ALTERATION IN MENTAL STATUS: Status: ACTIVE | Noted: 2024-07-03

## 2024-07-03 PROBLEM — R55 CONVULSIVE SYNCOPE: Status: ACTIVE | Noted: 2024-07-03

## 2024-07-03 LAB
CHOLEST SERPL-MCNC: 125 MG/DL
ERYTHROCYTE [DISTWIDTH] IN BLOOD BY AUTOMATED COUNT: 12.2 % (ref 11.5–14.5)
EST. AVERAGE GLUCOSE BLD GHB EST-MCNC: 177 MG/DL
GLUCOSE BLD STRIP.AUTO-MCNC: 142 MG/DL (ref 65–117)
GLUCOSE BLD STRIP.AUTO-MCNC: 145 MG/DL (ref 65–117)
GLUCOSE BLD STRIP.AUTO-MCNC: 173 MG/DL (ref 65–117)
GLUCOSE BLD STRIP.AUTO-MCNC: 235 MG/DL (ref 65–117)
HBA1C MFR BLD: 7.8 % (ref 4–5.6)
HCT VFR BLD AUTO: 35.9 % (ref 36.6–50.3)
HDLC SERPL-MCNC: 44 MG/DL
HDLC SERPL: 2.8 (ref 0–5)
HGB BLD-MCNC: 11.5 G/DL (ref 12.1–17)
LDLC SERPL CALC-MCNC: 71 MG/DL (ref 0–100)
MCH RBC QN AUTO: 30.4 PG (ref 26–34)
MCHC RBC AUTO-ENTMCNC: 32 G/DL (ref 30–36.5)
MCV RBC AUTO: 95 FL (ref 80–99)
NRBC # BLD: 0 K/UL (ref 0–0.01)
NRBC BLD-RTO: 0 PER 100 WBC
PLATELET # BLD AUTO: 158 K/UL (ref 150–400)
PMV BLD AUTO: 10.1 FL (ref 8.9–12.9)
RBC # BLD AUTO: 3.78 M/UL (ref 4.1–5.7)
SERVICE CMNT-IMP: ABNORMAL
TRIGL SERPL-MCNC: 50 MG/DL
VLDLC SERPL CALC-MCNC: 10 MG/DL
WBC # BLD AUTO: 6.9 K/UL (ref 4.1–11.1)

## 2024-07-03 PROCEDURE — 80061 LIPID PANEL: CPT

## 2024-07-03 PROCEDURE — 95816 EEG AWAKE AND DROWSY: CPT

## 2024-07-03 PROCEDURE — 6370000000 HC RX 637 (ALT 250 FOR IP): Performed by: INTERNAL MEDICINE

## 2024-07-03 PROCEDURE — 93308 TTE F-UP OR LMTD: CPT

## 2024-07-03 PROCEDURE — 6360000002 HC RX W HCPCS: Performed by: INTERNAL MEDICINE

## 2024-07-03 PROCEDURE — 97162 PT EVAL MOD COMPLEX 30 MIN: CPT

## 2024-07-03 PROCEDURE — 92610 EVALUATE SWALLOWING FUNCTION: CPT

## 2024-07-03 PROCEDURE — 97535 SELF CARE MNGMENT TRAINING: CPT

## 2024-07-03 PROCEDURE — 83036 HEMOGLOBIN GLYCOSYLATED A1C: CPT

## 2024-07-03 PROCEDURE — 95819 EEG AWAKE AND ASLEEP: CPT | Performed by: PSYCHIATRY & NEUROLOGY

## 2024-07-03 PROCEDURE — 82962 GLUCOSE BLOOD TEST: CPT

## 2024-07-03 PROCEDURE — 2580000003 HC RX 258: Performed by: INTERNAL MEDICINE

## 2024-07-03 PROCEDURE — 92612 ENDOSCOPY SWALLOW (FEES) VID: CPT

## 2024-07-03 PROCEDURE — 97166 OT EVAL MOD COMPLEX 45 MIN: CPT

## 2024-07-03 PROCEDURE — 99221 1ST HOSP IP/OBS SF/LOW 40: CPT | Performed by: PSYCHIATRY & NEUROLOGY

## 2024-07-03 PROCEDURE — 92522 EVALUATE SPEECH PRODUCTION: CPT

## 2024-07-03 PROCEDURE — 97530 THERAPEUTIC ACTIVITIES: CPT

## 2024-07-03 PROCEDURE — 70450 CT HEAD/BRAIN W/O DYE: CPT

## 2024-07-03 PROCEDURE — 6370000000 HC RX 637 (ALT 250 FOR IP): Performed by: EMERGENCY MEDICINE

## 2024-07-03 PROCEDURE — 36415 COLL VENOUS BLD VENIPUNCTURE: CPT

## 2024-07-03 PROCEDURE — 1100000003 HC PRIVATE W/ TELEMETRY

## 2024-07-03 PROCEDURE — 85027 COMPLETE CBC AUTOMATED: CPT

## 2024-07-03 RX ORDER — CASTOR OIL AND BALSAM, PERU 788; 87 MG/G; MG/G
OINTMENT TOPICAL 2 TIMES DAILY
Status: DISCONTINUED | OUTPATIENT
Start: 2024-07-03 | End: 2024-07-09 | Stop reason: HOSPADM

## 2024-07-03 RX ORDER — ATORVASTATIN CALCIUM 20 MG/1
20 TABLET, FILM COATED ORAL NIGHTLY
Status: DISCONTINUED | OUTPATIENT
Start: 2024-07-03 | End: 2024-07-09 | Stop reason: HOSPADM

## 2024-07-03 RX ADMIN — ENOXAPARIN SODIUM 30 MG: 100 INJECTION SUBCUTANEOUS at 09:45

## 2024-07-03 RX ADMIN — SODIUM CHLORIDE, PRESERVATIVE FREE 10 ML: 5 INJECTION INTRAVENOUS at 21:01

## 2024-07-03 RX ADMIN — SODIUM CHLORIDE, PRESERVATIVE FREE 10 ML: 5 INJECTION INTRAVENOUS at 09:45

## 2024-07-03 RX ADMIN — Medication: at 21:01

## 2024-07-03 RX ADMIN — ASPIRIN 81 MG: 81 TABLET, CHEWABLE ORAL at 09:44

## 2024-07-03 RX ADMIN — ACETAMINOPHEN 650 MG: 325 TABLET ORAL at 02:52

## 2024-07-03 RX ADMIN — ATORVASTATIN CALCIUM 20 MG: 20 TABLET, FILM COATED ORAL at 21:01

## 2024-07-03 RX ADMIN — Medication: at 05:51

## 2024-07-03 ASSESSMENT — PAIN SCALES - GENERAL
PAINLEVEL_OUTOF10: 5
PAINLEVEL_OUTOF10: 0

## 2024-07-03 ASSESSMENT — PAIN DESCRIPTION - ORIENTATION: ORIENTATION: RIGHT

## 2024-07-03 ASSESSMENT — PAIN - FUNCTIONAL ASSESSMENT: PAIN_FUNCTIONAL_ASSESSMENT: ACTIVITIES ARE NOT PREVENTED

## 2024-07-03 ASSESSMENT — PAIN DESCRIPTION - LOCATION: LOCATION: ARM

## 2024-07-03 ASSESSMENT — PAIN DESCRIPTION - DESCRIPTORS: DESCRIPTORS: ACHING
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
## 2024-07-03 NOTE — PROGRESS NOTES
Attempted to schedule PCP hospital follow up appointment. Unable to reach anyone, unable to leave voicemail. Dr. Ray's office is closed from 6/28/24-7/8/24 for the 7/4 holiday. [facility] placed [facility] information AVS for patient resource.  Pending patient discharge. Antoinette Cabrales, Care Management Assistant

## 2024-07-03 NOTE — PLAN OF CARE
Speech LAnguage Pathology EVALUATION    Patient: Anton Gomes (89 y.o. male)  Date: 7/3/2024  Primary Diagnosis: Dysarthria [R47.1]  Facial droop [R29.810]  Stroke-like symptoms [R29.90]  Altered mental status, unspecified altered mental status type [R41.82]       Precautions:                     ASSESSMENT :  The patient presents with L labial asymmetry, significant L lingual deviation, and decreased lingual range of motion, concerning for cranial nerve VII and XII involvement. Increased risk for aspiration given oral deficits in the setting of acute stroke work-up, questionable seizures, and history of dementia. No overt clinicial signs of aspiration observed with thin liquid or pureed consistencies, although risk for silent aspiration remains. Patient with no attempts to masticate softened cracker when placed in oral cavity despite maximal cues, with independent expectoration of solid trial. Instrumental swallow study is indicated. Recommend NPO until instrumental imaging can be completed.    Patient presents with at least moderate dysarthria, characterized by imprecise articulation and blended word boundaries. Patient's intelligibility is impacted at the sentence and conversation level. Patient with decreased cognition, necessitating frequent repetition for command following, basic questions, etc., suspect related to patient's history of dementia at baseline.     Patient will benefit from skilled intervention to address the above impairments.     PLAN :  Recommendations and Planned Interventions:  Diet: NPO with exception of ice chips following oral care with RN present  -- Medically necessary medication crushed in puree (applesauce, pudding), if cleared by Pharmacy  -- Flexible Endoscopic Evaluation of Swallow (FEES) at bedside today    S.L.O.B.  Slow down (slow your rate of speech down)  Loud volume (talking louder makes it easier for others to understand)  Over-articulate (opening your mouth wider,

## 2024-07-03 NOTE — PLAN OF CARE
Speech Language Pathology  Flexible Endoscopic Evaluation of Swallowing-FEES  Patient: Anton Gomes (89 y.o. male)  Date: 7/3/2024  Primary Diagnosis: Dysarthria [R47.1]  Facial droop [R29.810]  Stroke-like symptoms [R29.90]  Altered mental status, unspecified altered mental status type [R41.82]       Precautions: Fall Risk, Bed Alarm, Seizure                  ASSESSMENT :  The patient presents with objectively assessed grossly functional pharyngeal phase given patient's significantly advanced age. Patient with swallow initiation intermittently at the level of the lateral channels, however this is considered WFL as patient is 89 y.o. Intermittent minimal vallecular residue observed following pureed trials, which cleared with a liquid wash. No penetration or aspiration observed with any consistencies trialed. Solids not trialed at this time as patient with no attempts to masticate cracker during clinical bedside swallow evaluation.     At this time, recommend Puree/Thin Liquid diet with 1:1 assistance/supervision and aspiration precautions outlined below.    Patient will benefit from skilled intervention to address the above impairments.     PLAN :  Recommendations and Planned Interventions:  Diet: Puree and thin liquids  -- Medication whole in puree (applesauce, pudding)  -- 1:1 assistance/supervision with PO intake  -- Aspiration precautions: upright for all PO, small bites/sips, slow rate of intake, alternate liquids and purees, ensure oral cavity clearance after meals    S.L.O.B.  Slow down (slow your rate of speech down)  Loud volume (talking louder makes it easier for others to understand)  Over-articulate (opening your mouth wider, using a large oral cavity, to over-enunciate the word)  Breath support (take a deep, diaphragmatic breath prior to speaking)    Recommend next SLP session: diet tolerance, determine appropriateness for diet advancement, dysarthria treatment    Acute SLP Services: Yes, patient

## 2024-07-03 NOTE — PROCEDURES
[facility]              [address] [id number]                                   EEG      PATIENT NAME: KYLE KEARNEY          : 10/06/1932  MED REC NO: [id number]                       ROOM: 132  ACCOUNT NO: [id number]                       ADMIT DATE: 2024  PROVIDER: Feliberto Izquierdo MD    DATE OF SERVICE:  2024    CLINICAL INDICATION:  The patient is a 89-year-old male with a history of altered mental status, EEG to rule out seizures, rule out encephalopathy, rule out postictal state without convulsive syncope.    EEG CLASSIFICATION:  Dysrhythmic grade 1, generalized.    DESCRIPTION OF THE RECORD:  This is a 16-channel EEG on the patient to evaluate for altered mental status.  During this study, the patient had no areas of clear focal slowing. No clear spike or spike-and-wave discharges.  No recorded electrographic or dysrhythmic spells of any type seen.  There was a mild increase in generalized 2 to 6 activity seen, most consistent with diffuse encephalopathy, toxic, metabolic, or degenerative type.  The patient did enter stage 2 sleep with K complexes and sleep spindles seen in central head regions.  Hyperventilation was not performed.  On photic stimulation, there is a minimal driving response in the posterior head regions.    INTERPRETATION:  This is a mildly abnormal electroencephalogram due to mild generalized slowing seen most consistent with diffuse encephalopathy of toxic, metabolic, or degenerative type.  No clear focal process or epileptiform activity seen.  Clinical correlation is recommended.        FELIBERTO IZQUIERDO MD      TAS/AQS  D:  2024 13:20:32  T:  2024 13:35:37  JOB #:  [id number]    CC:   Feliberto Izquierdo MD

## 2024-07-03 NOTE — PROGRESS NOTES
End of Shift Note    Bedside shift change report given to  JIGAR Stern (oncoming nurse) by Natalya Schuster RN .        Shift worked:  nights   Shift summary and any significant changes:     New admit, no acute events, mentation improving, still confused, follows commands.  UA collected and resulted. Turning every 2 hours. NPO pending speech eval.     Concerns for physician to address:  none   Zone phone for oncoming shift:        Patient Information  Anton Gomes  89 y.o.  7/2/2024 11:22 AM by Thanh Craft MD. 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    Depressive disorder 09/04/2018    TIA (transient ischemic attack) 07/24/2014     Past Medical History:   Diagnosis Date    Chronic kidney disease     Dementia (HCC)     Diabetes (HCC)     Gastrointestinal disorder     Glaucoma     Gout     Hypercholesteremia     Hypertension     Other ill-defined conditions(799.89)     gout, glaucoma    Other ill-defined conditions(799.89)     high cholesterol    Seizures (HCC)     Possible seizure 3/6 following administration of fentanyl       Core Measures:  CVA: yes  CHF: no  PNA: no    Activity:  Level of Assistance: Maximum assist, patient does 25-49%  Number times ambulated in hallways past shift: 0  Number of times OOB to chair past shift: 0    Cardiac:   Cardiac Monitoring: yes,     Access:   Current line(s): PIV    Respiratory:   O2 Device: None (Room air)    GI:     Current diet:  Diet NPO  Tolerating current diet: Yes    Pain Management:   Patient states pain is manageable on current regimen: yes    Skin:  Chaka Scale Score: 12  Interventions: turn q2  Pressure

## 2024-07-03 NOTE — PLAN OF CARE
Problem: Physical Therapy - Adult  Goal: By Discharge: Performs mobility at highest level of function for planned discharge setting.  See evaluation for individualized goals.  Description: FUNCTIONAL STATUS PRIOR TO ADMISSION: pt questionable historian, per chart review ambulated with RW at baseline    HOME SUPPORT PRIOR TO ADMISSION: The patient is resident at assisted living facility.    Physical Therapy Goals  Initiated 7/3/2024  1.  Patient will move from supine to sit and sit to supine, scoot up and down, and roll side to side in bed with supervision/set-up within 7 day(s).    2.  Patient will perform sit to stand with contact guard assist within 7 day(s).  3.  Patient will transfer from bed to chair and chair to bed with contact guard assist using the least restrictive device within 7 day(s).  4.  Patient will ambulate with contact guard assist for 10 feet with the least restrictive device within 7 day(s).     Outcome: Progressing     PHYSICAL THERAPY EVALUATION    Patient: Anton Gomes (89 y.o. male)  Date: 7/3/2024  Primary Diagnosis: Dysarthria [R47.1]  Facial droop [R29.810]  Stroke-like symptoms [R29.90]  Altered mental status, unspecified altered mental status type [R41.82]       Precautions: Restrictions/Precautions: Fall Risk, Bed Alarm, Seizure                      ASSESSMENT :   Pt seen for PT evaluation following admission for AMS, L facial droop and increased confusion. CT head negative for acute infarct, pt unable to get MRI. Pt oriented to self and place, pleasantly confused but able to follow simple, 1-step directions. Pt transferred supine > sit with CGA, sit <> stand with B HHA and Min A x2. Pt with widened YVONNE, posterior trunk lean, took steps toward bedside chair with B HHA and returned to sit in chair. Pt with episode of urinary incontinence, transferred sit <> stand from bedside chair with RW and Min A x2, cueing for hand placement with poor carryover. Pt left seated in bedside

## 2024-07-03 NOTE — PLAN OF CARE
Pt will benefit from acute OT services while admitted and SNF at d/c.     Functional Outcome Measure:  The patient scored 58/66 on the fugl parnell outcome measure.         PLAN :  Recommendations and Planned Interventions:   self care training, therapeutic activities, functional mobility training, balance training, therapeutic exercise, neuromuscular re-education, endurance activities, and patient education    Frequency/Duration: OT Plan of Care: 4 times/week    Recommendation for discharge: (in order for the patient to meet his/her long term goals): Therapy up to 5 days/week in Skilled nursing facility    Other factors to consider for discharge: impaired cognition, high risk for falls, and not safe to be alone    IF patient discharges home will need the following DME:  pt likely has any necessary equipment at LTC facility       SUBJECTIVE:   Patient stated, “it's alright.”  OBJECTIVE DATA SUMMARY:     Past Medical History:   Diagnosis Date    Chronic kidney disease     Dementia (HCC)     Diabetes (HCC)     Gastrointestinal disorder     Glaucoma     Gout     Hypercholesteremia     Hypertension     Other ill-defined conditions(799.89)     gout, glaucoma    Other ill-defined conditions(799.89)     high cholesterol    Seizures (HCC)     Possible seizure 3/6 following administration of fentanyl     Past Surgical History:   Procedure Laterality Date    ORTHOPEDIC SURGERY      cervical surgery          Expanded or extensive additional review of patient history:   Social/Functional History  Type of Home: Facility  ADL Assistance: Needs assistance  Homemaking Responsibilities: No  Ambulation Assistance: Needs assistance  Transfer Assistance: Needs assistance      Hand Dominance: right     EXAMINATION OF PERFORMANCE DEFICITS:    Cognitive/Behavioral Status:  Orientation  Overall Orientation Status: Impaired  Orientation Level: Oriented to place;Oriented to person  Cognition  Overall Cognitive Status:  Exceptions  Arousal/Alertness: Delayed responses to stimuli  Following Commands: Follows one step commands with increased time;Follows one step commands with repetition  Problem Solving: Decreased awareness of errors;Assistance required to correct errors made  Initiation: Requires cues for some  Sequencing: Requires cues for some    Skin: intact    Edema: none noted    Hearing:   Hearing  Hearing: Within functional limits    Vision/Perceptual:                  Range of Motion:   AROM: Generally decreased, functional         Strength:  Strength: Generally decreased, functional      Coordination:  Coordination: Generally decreased, functional     Coordination: Grossly decreased, non-functional (difficulty with multi-step commands)      Tone & Sensation:   Tone: Normal  Sensation: Intact          Functional Mobility and Transfers for ADLs:    Bed Mobility:     Bed Mobility Training  Bed Mobility Training: Yes  Interventions: Safety awareness training;Verbal cues  Supine to Sit: Contact-guard assistance  Scooting: Contact-guard assistance    Transfers:      Transfer Training  Transfer Training: Yes  Sit to Stand: Minimum assistance;Assist X2  Stand to Sit: Minimum assistance;Assist X2  Bed to Chair: Minimum assistance;Assist X2          Functional Mobility: Minimal assistance          Balance:      Balance  Sitting: Impaired  Sitting - Static: Fair (occasional)  Sitting - Dynamic: Fair (occasional)  Standing: Impaired  Standing - Static: Constant support;Fair  Standing - Dynamic: Constant support;Poor      ADL Assessment:          Feeding: Setup       Grooming: Contact guard assistance       UE Bathing: Minimal assistance       Skin Care: Bath wipes;Chlorhexidine wipes    LE Bathing: Maximum assistance       UE Dressing: Minimal assistance       LE Dressing: Dependent/Total  LE Dressing Skilled Clinical Factors: donning B socks    Toileting: Dependent/Total  Toileting Skilled Clinical Factors: external catheter; pt

## 2024-07-03 NOTE — PROGRESS NOTES
Speech Pathology Note    Flexible Endoscopic Evaluation of Swallow (FEES) at bedside complete. Full note to follow. Recommend Puree/Thin Liquid diet with 1:1 assistance/supervision as needed and general aspiration precautions (upright for all PO, small bites/sips, slow rate of intake, alternate liquids and purees, ensure oral cavity clearance after meals). Thank you.    Buzz Jarrett M.S., CCC-SLP

## 2024-07-03 NOTE — PLAN OF CARE
Problem: Discharge Planning  Goal: Discharge to home or other facility with appropriate resources  7/3/2024 0845 by Leena Ward RN  Outcome: Progressing  Flowsheets (Taken 7/3/2024 0810)  Discharge to home or other facility with appropriate resources:   Identify barriers to discharge with patient and caregiver   Arrange for needed discharge resources and transportation as appropriate   Identify discharge learning needs (meds, wound care, etc)  7/2/2024 2150 by Natalya Schuster RN  Outcome: Progressing  7/2/2024 1852 by Florencia Mcknight RN  Outcome: Progressing  7/2/2024 1851 by Florencia Mcknight RN  Outcome: Progressing     Problem: Pain  Goal: Verbalizes/displays adequate comfort level or baseline comfort level  7/3/2024 0845 by Leena Ward RN  Outcome: Progressing  Flowsheets (Taken 7/3/2024 0810)  Verbalizes/displays adequate comfort level or baseline comfort level:   Encourage patient to monitor pain and request assistance   Assess pain using appropriate pain scale   Administer analgesics based on type and severity of pain and evaluate response   Implement non-pharmacological measures as appropriate and evaluate response  7/2/2024 2150 by Natalya Schuster RN  Outcome: Progressing  7/2/2024 1852 by Florencia Mcknight RN  Outcome: Progressing  7/2/2024 1851 by Florencia Mcknight RN  Outcome: Progressing     Problem: Skin/Tissue Integrity  Goal: Absence of new skin breakdown  Description: 1.  Monitor for areas of redness and/or skin breakdown  2.  Assess vascular access sites hourly  3.  Every 4-6 hours minimum:  Change oxygen saturation probe site  4.  Every 4-6 hours:  If on nasal continuous positive airway pressure, respiratory therapy assess nares and determine need for appliance change or resting period.  7/3/2024 0845 by Leena Ward, RN  Outcome: Progressing  7/2/2024 2150 by aNtalya Schuster RN  Outcome: Progressing  7/2/2024 1852 by Florencia Mcknight RN  Outcome: Progressing  7/2/2024 1851 by Florencia Mcknight

## 2024-07-03 NOTE — CONSULTS
CONSULT - Neurology      Name:  Anton Gmoes       MRN: [id number]  Location: 132/01    Date: 7/3/2024  Time:  9:42 AM        Chief Complaint:   Chief Complaint   Patient presents with    Altered Mental Status     Pt BIBEMS from facility who stated pt who is normally A&Ox4 has a LKW of 1500 yesterday and a new L facial droop \"that started 1 hour ago\". Family at facility stated this was also not pt's baseline. BGL of 203       HPI:  It is a great pleasure to see Anton Gomes, a 89 y.o. male today in the hospital . Briefly these are the events happened as per the chart and taken from the chart.  The patient was doing fine and the symptoms started with confusion, could not remember names . The symptoms fluctuated in the beginning. There were no aggravating and relieving factors. The patient was brought to the emergency room for further evaluation. staff has noticed that he had a left-sided facial droop and he was more confused.   CT Brain Perfusion demonstrates no blood flow or volume deficits to suggest  acute ischemia or tissue at ischemic risk.     CTA Head demonstrates no large vessel occlusion or significant flow-limiting  stenosis. No intracranial aneurysm.     CTA Neck demonstrates no large vessel occlusion or significant flow-limiting  stenosis.           PAST MEDICAL HISTORY:  Past Medical History:   Diagnosis Date    Chronic kidney disease     Dementia (HCC)     Diabetes (HCC)     Gastrointestinal disorder     Glaucoma     Gout     Hypercholesteremia     Hypertension     Other ill-defined conditions(799.89)     gout, glaucoma    Other ill-defined conditions(799.89)     high cholesterol    Seizures (HCC)     Possible seizure 3/6 following administration of fentanyl     PAST SURGICAL HISTORY:    Past Surgical History:   Procedure Laterality Date    ORTHOPEDIC SURGERY      cervical surgery     FAMILY HISTORY:  No family history on file.  SOCIAL HISTORY:   Social History     Tobacco Use    Smoking  10.1 MG/DL    Total Bilirubin 0.4 0.2 - 1.0 MG/DL    ALT 31 12 - 78 U/L    AST 30 15 - 37 U/L    Alk Phosphatase 139 (H) 45 - 117 U/L    Total Protein 8.5 (H) 6.4 - 8.2 g/dL    Albumin 4.1 3.5 - 5.0 g/dL    Globulin 4.4 (H) 2.0 - 4.0 g/dL    Albumin/Globulin Ratio 0.9 (L) 1.1 - 2.2     Protime-INR    Collection Time: 07/02/24 11:30 AM   Result Value Ref Range    INR 1.2 (H) 0.9 - 1.1      Protime 12.3 (H) 9.0 - 11.1 sec   Troponin    Collection Time: 07/02/24 11:30 AM   Result Value Ref Range    Troponin, High Sensitivity 28 0 - 76 ng/L   Extra Tubes Hold    Collection Time: 07/02/24 11:30 AM   Result Value Ref Range    Specimen HOld RED     Comment:        Add-on orders for these samples will be processed based on acceptable specimen integrity and analyte stability, which may vary by analyte.   POCT Glucose    Collection Time: 07/02/24 11:31 AM   Result Value Ref Range    POC Glucose 188 (H) 65 - 117 mg/dL    Performed by: Carlos Alberto Dale EDT    EKG 12 Lead    Collection Time: 07/02/24 11:47 AM   Result Value Ref Range    Ventricular Rate 84 BPM    Atrial Rate 80 BPM    P-R Interval 222 ms    QRS Duration 68 ms    Q-T Interval 390 ms    QTc Calculation (Bazett) 460 ms    P Axis 74 degrees    R Axis 31 degrees    T Axis 44 degrees    Diagnosis       Undetermined rhythm  When compared with ECG of 21-APR-2024 08:29,  Current undetermined rhythm precludes rhythm comparison, needs review     POCT Glucose    Collection Time: 07/02/24  4:40 PM   Result Value Ref Range    POC Glucose 158 (H) 65 - 117 mg/dL    Performed by: Tejas Alejandre PCT    Lactic Acid    Collection Time: 07/02/24  5:15 PM   Result Value Ref Range    Lactic Acid, Plasma 1.0 0.4 - 2.0 MMOL/L   POCT Glucose    Collection Time: 07/02/24  8:48 PM   Result Value Ref Range    POC Glucose 145 (H) 65 - 117 mg/dL    Performed by: Guille Velázquez RN    Urinalysis with Reflex to Culture    Collection Time: 07/02/24 10:53 PM    Specimen: Urine   Result Value Ref

## 2024-07-03 NOTE — PLAN OF CARE
Problem: Discharge Planning  Goal: Discharge to home or other facility with appropriate resources  7/2/2024 2150 by Natalya Schuster RN  Outcome: Progressing  7/2/2024 1852 by Florencia Mcknight RN  Outcome: Progressing  7/2/2024 1851 by Florencia Mcknight RN  Outcome: Progressing     Problem: Pain  Goal: Verbalizes/displays adequate comfort level or baseline comfort level  7/2/2024 2150 by Natalya Schuster RN  Outcome: Progressing  7/2/2024 1852 by Florencia Mcknight RN  Outcome: Progressing  7/2/2024 1851 by Florencia Mcknight RN  Outcome: Progressing     Problem: Skin/Tissue Integrity  Goal: Absence of new skin breakdown  Description: 1.  Monitor for areas of redness and/or skin breakdown  2.  Assess vascular access sites hourly  3.  Every 4-6 hours minimum:  Change oxygen saturation probe site  4.  Every 4-6 hours:  If on nasal continuous positive airway pressure, respiratory therapy assess nares and determine need for appliance change or resting period.  7/2/2024 2150 by Natalya Schuster RN  Outcome: Progressing  7/2/2024 1852 by Florencia Mcknight RN  Outcome: Progressing  7/2/2024 1851 by Florencia Mcknight RN  Outcome: Progressing     Problem: ABCDS Injury Assessment  Goal: Absence of physical injury  7/2/2024 2150 by Natalya Schuster RN  Outcome: Progressing  7/2/2024 1852 by Florencia Mcknight RN  Outcome: Progressing  7/2/2024 1851 by Florencia Mcknight RN  Outcome: Progressing     Problem: Safety - Adult  Goal: Free from fall injury  7/2/2024 2150 by Natalya Schuster RN  Outcome: Progressing  7/2/2024 1852 by Florencia Mcknight RN  Outcome: Progressing  7/2/2024 1851 by Florencia Mcknight RN  Outcome: Progressing     Problem: Neurosensory - Adult  Goal: Achieves stable or improved neurological status  7/2/2024 2150 by Natalya Schuster RN  Outcome: Progressing  7/2/2024 1852 by Florencia Mcknight RN  Outcome: Progressing  Goal: Absence of seizures  7/2/2024 2150 by Natalya Schuster RN  Outcome: Progressing  7/2/2024 1852 by Florencia Mcknight RN  Outcome:

## 2024-07-04 LAB
ANION GAP SERPL CALC-SCNC: 4 MMOL/L (ref 5–15)
BUN SERPL-MCNC: 33 MG/DL (ref 6–20)
BUN/CREAT SERPL: 16 (ref 12–20)
CALCIUM SERPL-MCNC: 9.5 MG/DL (ref 8.5–10.1)
CHLORIDE SERPL-SCNC: 109 MMOL/L (ref 97–108)
CO2 SERPL-SCNC: 24 MMOL/L (ref 21–32)
CREAT SERPL-MCNC: 2.1 MG/DL (ref 0.7–1.3)
ECHO AO ROOT DIAM: 3.7 CM
ECHO AO ROOT INDEX: 1.99 CM/M2
ECHO AV AREA PEAK VELOCITY: 2.7 CM2
ECHO AV AREA/BSA PEAK VELOCITY: 1.5 CM2/M2
ECHO AV PEAK GRADIENT: 6 MMHG
ECHO AV PEAK VELOCITY: 1.2 M/S
ECHO AV VELOCITY RATIO: 0.67
ECHO BSA: 1.87 M2
ECHO LA DIAMETER INDEX: 1.67 CM/M2
ECHO LA DIAMETER: 3.1 CM
ECHO LA TO AORTIC ROOT RATIO: 0.84
ECHO LV FRACTIONAL SHORTENING: 24 % (ref 28–44)
ECHO LV INTERNAL DIMENSION DIASTOLE INDEX: 2.2 CM/M2
ECHO LV INTERNAL DIMENSION DIASTOLIC: 4.1 CM (ref 4.2–5.9)
ECHO LV INTERNAL DIMENSION SYSTOLIC INDEX: 1.67 CM/M2
ECHO LV INTERNAL DIMENSION SYSTOLIC: 3.1 CM
ECHO LV IVSD: 1.2 CM (ref 0.6–1)
ECHO LV MASS 2D: 171.7 G (ref 88–224)
ECHO LV MASS INDEX 2D: 92.3 G/M2 (ref 49–115)
ECHO LV POSTERIOR WALL DIASTOLIC: 1.2 CM (ref 0.6–1)
ECHO LV RELATIVE WALL THICKNESS RATIO: 0.59
ECHO LVOT AREA: 4.2 CM2
ECHO LVOT DIAM: 2.3 CM
ECHO LVOT PEAK GRADIENT: 3 MMHG
ECHO LVOT PEAK VELOCITY: 0.8 M/S
ECHO PV MAX VELOCITY: 1.1 M/S
ECHO PV PEAK GRADIENT: 4 MMHG
ECHO RV FREE WALL PEAK S': 11 CM/S
ECHO RV TAPSE: 1.8 CM (ref 1.7–?)
GLUCOSE BLD STRIP.AUTO-MCNC: 147 MG/DL (ref 65–117)
GLUCOSE BLD STRIP.AUTO-MCNC: 159 MG/DL (ref 65–117)
GLUCOSE BLD STRIP.AUTO-MCNC: 185 MG/DL (ref 65–117)
GLUCOSE BLD STRIP.AUTO-MCNC: 243 MG/DL (ref 65–117)
GLUCOSE SERPL-MCNC: 166 MG/DL (ref 65–100)
POTASSIUM SERPL-SCNC: 5.1 MMOL/L (ref 3.5–5.1)
SERVICE CMNT-IMP: ABNORMAL
SODIUM SERPL-SCNC: 137 MMOL/L (ref 136–145)

## 2024-07-04 PROCEDURE — 6370000000 HC RX 637 (ALT 250 FOR IP): Performed by: INTERNAL MEDICINE

## 2024-07-04 PROCEDURE — 2580000003 HC RX 258: Performed by: INTERNAL MEDICINE

## 2024-07-04 PROCEDURE — 82962 GLUCOSE BLOOD TEST: CPT

## 2024-07-04 PROCEDURE — 6360000002 HC RX W HCPCS: Performed by: INTERNAL MEDICINE

## 2024-07-04 PROCEDURE — 36415 COLL VENOUS BLD VENIPUNCTURE: CPT

## 2024-07-04 PROCEDURE — 80048 BASIC METABOLIC PNL TOTAL CA: CPT

## 2024-07-04 PROCEDURE — 1100000003 HC PRIVATE W/ TELEMETRY

## 2024-07-04 RX ADMIN — Medication: at 09:01

## 2024-07-04 RX ADMIN — ATORVASTATIN CALCIUM 20 MG: 20 TABLET, FILM COATED ORAL at 20:37

## 2024-07-04 RX ADMIN — SODIUM CHLORIDE, PRESERVATIVE FREE 10 ML: 5 INJECTION INTRAVENOUS at 09:01

## 2024-07-04 RX ADMIN — Medication: at 20:37

## 2024-07-04 RX ADMIN — SODIUM CHLORIDE, PRESERVATIVE FREE 10 ML: 5 INJECTION INTRAVENOUS at 20:37

## 2024-07-04 RX ADMIN — ASPIRIN 81 MG: 81 TABLET, CHEWABLE ORAL at 09:01

## 2024-07-04 RX ADMIN — ENOXAPARIN SODIUM 30 MG: 100 INJECTION SUBCUTANEOUS at 09:00

## 2024-07-04 RX ADMIN — INSULIN LISPRO 2 UNITS: 100 INJECTION, SOLUTION INTRAVENOUS; SUBCUTANEOUS at 17:06

## 2024-07-04 ASSESSMENT — PAIN SCALES - GENERAL: PAINLEVEL_OUTOF10: 0

## 2024-07-04 NOTE — CARE COORDINATION
CM acknowledged receipt of consult to arrange SNF; chart review completed. Per chart, pt is long-term care resident at [facility]. CM opening referral for [facility], will confirm pt's LTC status and begin auth process for skilled services on return.     Kenisha Grover, [facility] – [place], [place]  Care Management  [phone]
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
## 2024-07-04 NOTE — PROGRESS NOTES
End of Shift Note    Bedside shift change report given to Florencia RN (oncoming nurse) by Natalya Schuster RN .        Shift worked:  nights   Shift summary and any significant changes:     No acute events.       Concerns for physician to address:  none   Zone phone for oncoming shift:        Patient Information  Anton Gomes  89 y.o.  7/2/2024 11:22 AM by Thanh Craft MD. 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    Depressive disorder 09/04/2018    TIA (transient ischemic attack) 07/24/2014     Past Medical History:   Diagnosis Date    Chronic kidney disease     Dementia (HCC)     Diabetes (HCC)     Gastrointestinal disorder     Glaucoma     Gout     Hypercholesteremia     Hypertension     Other ill-defined conditions(799.89)     gout, glaucoma    Other ill-defined conditions(799.89)     high cholesterol    Seizures ([facility])     Possible seizure 3/6 following administration of fentanyl       Core Measures:  CVA: yes  CHF: no  PNA: no    Activity:  Level of Assistance: Moderate assist, patient does 50-74%  Number times ambulated in hallways past shift: 0  Number of times OOB to chair past shift: 0    Cardiac:   Cardiac Monitoring: yes,     Access:   Current line(s): PIV    Respiratory:   O2 Device: None (Room air)    GI:  Last BM (including prior to admit):  (PTA-Unknown to Pt)  Current diet:  ADULT DIET; Dysphagia - Pureed  Tolerating current diet: Yes    Pain Management:   Patient states pain is manageable on current regimen: yes    Skin:  Chaka Scale Score: 16  Interventions: turn  q2  Pressure injury: no    Patient Safety:  Fall Score: Howard Total Score: 95  Interventions: bed alarm  Self-release roll belt: No  Dexterity to release roll belt: N/A   (must document dexterity  here by stating Yes or No here, otherwise this is a restraint and must follow restraint documentation policy.)    DVT prophylaxis:  DVT prophylaxis: meds    Active Consults:  IP CONSULT TO TELE-NEUROLOGY  IP CONSULT TO HOSPITALIST  IP CONSULT TO NEUROLOGY    Length of Stay:  Expected LOS: 3  Actual LOS: 2    Natalya Schuster RN

## 2024-07-04 NOTE — PROGRESS NOTES
End of Shift Note    Bedside shift change report given to Jacqueline RN (oncoming nurse) by Deyanira Merida RN .        Shift worked:  3p-7p   Shift summary and any significant changes:     No acute events. Pending placement.     Concerns for physician to address:  none   Zone phone for oncoming shift:   [phone]     Patient Information  Anton Gomes  89 y.o.  7/2/2024 11:22 AM by Thanh Craft MD. 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    Depressive disorder 09/04/2018    TIA (transient ischemic attack) 07/24/2014     Past Medical History:   Diagnosis Date    Chronic kidney disease     Dementia (HCC)     Diabetes (HCC)     Gastrointestinal disorder     Glaucoma     Gout     Hypercholesteremia     Hypertension     Other ill-defined conditions(799.89)     gout, glaucoma    Other ill-defined conditions(799.89)     high cholesterol    Seizures (HCC)     Possible seizure 3/6 following administration of fentanyl       Core Measures:  CVA: yes  CHF: no  PNA: no    Activity:  Level of Assistance: Moderate assist, patient does 50-74%  Number times ambulated in hallways past shift: 0  Number of times OOB to chair past shift: 0    Cardiac:   Cardiac Monitoring: yes,     Access:   Current line(s): PIV    Respiratory:   O2 Device: None (Room air)    GI:  Last BM (including prior to admit):  (Patient can't remember the lbm)  Current diet:  ADULT DIET; Dysphagia - Pureed  Tolerating current diet: Yes    Pain Management:   Patient states pain is manageable on current regimen: yes    Skin:  Chaka Scale

## 2024-07-04 NOTE — PLAN OF CARE
Problem: Discharge Planning  Goal: Discharge to home or other facility with appropriate resources  Outcome: Progressing     Problem: Pain  Goal: Verbalizes/displays adequate comfort level or baseline comfort level  Outcome: Progressing     Problem: Skin/Tissue Integrity  Goal: Absence of new skin breakdown  Description: 1.  Monitor for areas of redness and/or skin breakdown  2.  Assess vascular access sites hourly  3.  Every 4-6 hours minimum:  Change oxygen saturation probe site  4.  Every 4-6 hours:  If on nasal continuous positive airway pressure, respiratory therapy assess nares and determine need for appliance change or resting period.  Outcome: Progressing     Problem: ABCDS Injury Assessment  Goal: Absence of physical injury  Outcome: Progressing     Problem: Safety - Adult  Goal: Free from fall injury  Outcome: Progressing     Problem: Neurosensory - Adult  Goal: Achieves stable or improved neurological status  Outcome: Progressing  Goal: Absence of seizures  Outcome: Progressing  Goal: Remains free of injury related to seizures activity  Outcome: Progressing  Goal: Achieves maximal functionality and self care  Outcome: Progressing     Problem: SLP Adult - Impaired Swallowing  Goal: By Discharge: Advance to least restrictive diet without signs or symptoms of aspiration for planned discharge setting.  See evaluation for individualized goals.  Description: Speech Pathology Goals  Initiated 7/3/2024    1. Patient will participate in FEES to further assess pharyngeal swallow function within 7 days. MET 7/3/2024  2. Patient will increase motor speech intelligibility to 80% at the conversation level, given minimal-moderate verbal cueing, within 7 days.  3. Patient will tolerate least restrictive diet without overt signs of aspiration or decline in respiratory status within 7 days.  7/3/2024 1242 by Buzz Jarrett, SLP  Outcome: Progressing     Problem: Physical Therapy - Adult  Goal: By Discharge: Performs

## 2024-07-05 LAB
GLUCOSE BLD STRIP.AUTO-MCNC: 201 MG/DL (ref 65–117)
GLUCOSE BLD STRIP.AUTO-MCNC: 230 MG/DL (ref 65–117)
GLUCOSE BLD STRIP.AUTO-MCNC: 251 MG/DL (ref 65–117)
GLUCOSE BLD STRIP.AUTO-MCNC: 272 MG/DL (ref 65–117)
SERVICE CMNT-IMP: ABNORMAL

## 2024-07-05 PROCEDURE — 6370000000 HC RX 637 (ALT 250 FOR IP): Performed by: INTERNAL MEDICINE

## 2024-07-05 PROCEDURE — 92526 ORAL FUNCTION THERAPY: CPT

## 2024-07-05 PROCEDURE — 97530 THERAPEUTIC ACTIVITIES: CPT

## 2024-07-05 PROCEDURE — 1100000003 HC PRIVATE W/ TELEMETRY

## 2024-07-05 PROCEDURE — 6360000002 HC RX W HCPCS: Performed by: INTERNAL MEDICINE

## 2024-07-05 PROCEDURE — 2580000003 HC RX 258: Performed by: INTERNAL MEDICINE

## 2024-07-05 PROCEDURE — 82962 GLUCOSE BLOOD TEST: CPT

## 2024-07-05 PROCEDURE — 97535 SELF CARE MNGMENT TRAINING: CPT

## 2024-07-05 PROCEDURE — 92507 TX SP LANG VOICE COMM INDIV: CPT

## 2024-07-05 RX ADMIN — INSULIN LISPRO 4 UNITS: 100 INJECTION, SOLUTION INTRAVENOUS; SUBCUTANEOUS at 11:03

## 2024-07-05 RX ADMIN — SODIUM CHLORIDE, PRESERVATIVE FREE 10 ML: 5 INJECTION INTRAVENOUS at 08:52

## 2024-07-05 RX ADMIN — ATORVASTATIN CALCIUM 20 MG: 20 TABLET, FILM COATED ORAL at 20:39

## 2024-07-05 RX ADMIN — SODIUM CHLORIDE, PRESERVATIVE FREE 10 ML: 5 INJECTION INTRAVENOUS at 20:39

## 2024-07-05 RX ADMIN — Medication: at 20:37

## 2024-07-05 RX ADMIN — ENOXAPARIN SODIUM 30 MG: 100 INJECTION SUBCUTANEOUS at 08:52

## 2024-07-05 RX ADMIN — INSULIN LISPRO 2 UNITS: 100 INJECTION, SOLUTION INTRAVENOUS; SUBCUTANEOUS at 08:53

## 2024-07-05 RX ADMIN — Medication: at 08:51

## 2024-07-05 RX ADMIN — ASPIRIN 81 MG: 81 TABLET, CHEWABLE ORAL at 08:51

## 2024-07-05 RX ADMIN — INSULIN LISPRO 4 UNITS: 100 INJECTION, SOLUTION INTRAVENOUS; SUBCUTANEOUS at 16:18

## 2024-07-05 NOTE — PLAN OF CARE
Problem: Discharge Planning  Goal: Discharge to home or other facility with appropriate resources  7/5/2024 0116 by Jacqueline Trent RN  Outcome: Progressing  7/4/2024 1427 by Feliciano Archer RN  Outcome: Progressing     Problem: Pain  Goal: Verbalizes/displays adequate comfort level or baseline comfort level  7/5/2024 0116 by Jacqueline Trent RN  Outcome: Progressing  Flowsheets (Taken 7/4/2024 2035)  Verbalizes/displays adequate comfort level or baseline comfort level:   Encourage patient to monitor pain and request assistance   Assess pain using appropriate pain scale   Administer analgesics based on type and severity of pain and evaluate response   Implement non-pharmacological measures as appropriate and evaluate response   Consider cultural and social influences on pain and pain management   Notify Licensed Independent Practitioner if interventions unsuccessful or patient reports new pain  7/4/2024 1427 by Feliciano Archer RN  Outcome: Progressing     Problem: Skin/Tissue Integrity  Goal: Absence of new skin breakdown  Description: 1.  Monitor for areas of redness and/or skin breakdown  2.  Assess vascular access sites hourly  3.  Every 4-6 hours minimum:  Change oxygen saturation probe site  4.  Every 4-6 hours:  If on nasal continuous positive airway pressure, respiratory therapy assess nares and determine need for appliance change or resting period.  7/5/2024 0116 by Jacqueline rTent RN  Outcome: Progressing  7/4/2024 1427 by Feliciano Archer RN  Outcome: Progressing     Problem: ABCDS Injury Assessment  Goal: Absence of physical injury  7/5/2024 0116 by Jacqueline Trent RN  Outcome: Progressing  7/4/2024 1427 by Feliciano Archer RN  Outcome: Progressing     Problem: Safety - Adult  Goal: Free from fall injury  7/5/2024 0116 by Jacqueline Trent RN  Outcome: Progressing  7/4/2024 1427 by Feliciano Archer RN  Outcome: Progressing     Problem: Neurosensory - Adult  Goal: Achieves stable or improved neurological  status  7/5/2024 0116 by Jacqueline Trent RN  Outcome: Progressing  7/4/2024 1427 by Feliciano Archer RN  Outcome: Progressing  Goal: Absence of seizures  7/5/2024 0116 by Jacqueline Trent RN  Outcome: Progressing  7/4/2024 1427 by Feliciano Archer RN  Outcome: Progressing  Goal: Remains free of injury related to seizures activity  7/5/2024 0116 by Jacqueline Trent RN  Outcome: Progressing  7/4/2024 1427 by Feliciano Archer RN  Outcome: Progressing  Goal: Achieves maximal functionality and self care  7/5/2024 0116 by Jacqueline Trent RN  Outcome: Progressing  7/4/2024 1427 by Feliciano Archer RN  Outcome: Progressing

## 2024-07-05 NOTE — PLAN OF CARE
Speech LAnguage Pathology TREATMENT    Patient: Anton Gomes (89 y.o. male)  Date: 7/5/2024  Primary Diagnosis: Dysarthria [R47.1]  Facial droop [R29.810]  Stroke-like symptoms [R29.90]  Altered mental status, unspecified altered mental status type [R41.82]       Precautions: Fall Risk, Bed Alarm, Seizure                  ASSESSMENT :  Patient seen for dysphagia and dysarthria treatment on this date. L lingual deviation persists, however patient's oral phase continues with functional oral manipulation, oral transit, and oral clearance for thin liquid and pureed consistencies. Naman crackers softened in applesauce trialed as a soft solid on this date. Patient observed with mild-moderately prolonged mastication yet independently achieved oral cavity clearance given increased time. Will advance to Minced&Moist/Thin Liquid diet at this time. Discussed with OT. Will encourage self-feeding.    Patient with improved motor speech on this date, in comparison to initiation evaluation, however dysarthria continues to be characterized by imprecise articulation and blended word boundaries. Diadochokinetic rates assessed to be mildly slow and imprecise. Will continue to follow.    Patient will benefit from skilled intervention to address the above impairments.     PLAN :  Recommendations and Planned Interventions:  Diet: Minced and moist and thin liquids  -- Medication whole in puree (applesauce, pudding)  -- 1:1 supervision with PO intake as needed  -- Encourage self-feeding  -- Aspiration precautions: upright for all PO, small bites/sips, slow rate of intake, alternate liquids and purees, ensure oral cavity clearance after meals     S.L.O.B.  Slow down (slow your rate of speech down)  Loud volume (talking louder makes it easier for others to understand)  Over-articulate (opening your mouth wider, using a large oral cavity, to over-enunciate the word)  Breath support (take a deep, diaphragmatic breath prior to  Detail Level: Detailed

## 2024-07-05 NOTE — PROGRESS NOTES
End of Shift Note    Bedside shift change report given to ESSENCE (oncoming nurse) by Ashley Schroeder RN .        Shift worked:  DAYS   Shift summary and any significant changes:     -AUTH STARTED 7/4/24.. PLACEMENT PENDING   -PATIENT TURNED   -PATIENT SAT IN CHAIR MOST OF DAY     Concerns for physician to address:  NONE   Zone phone for oncoming shift:   [phone]     Patient Information  Anotn Gomes  89 y.o.  7/2/2024 11:22 AM by Thanh Craft MD. 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 ([facility]) 09/05/2018    Depressive disorder 09/04/2018    TIA (transient ischemic attack) 07/24/2014     Past Medical History:   Diagnosis Date    Chronic kidney disease     Dementia (HCC)     Diabetes (HCC)     Gastrointestinal disorder     Glaucoma     Gout     Hypercholesteremia     Hypertension     Other ill-defined conditions(799.89)     gout, glaucoma    Other ill-defined conditions(799.89)     high cholesterol    Seizures (HCC)     Possible seizure 3/6 following administration of fentanyl       Core Measures:  CVA: yes  CHF: no  PNA: no    Activity:  Level of Assistance: Maximum assist, patient does 25-49%  Number times ambulated in hallways past shift: 0  Number of times OOB to chair past shift: 0    Cardiac:   Cardiac Monitoring: no    Access:   Current line(s): PIV    Respiratory:   O2 Device: None (Room air)    GI:  Last BM (including prior to admit): 07/05/24  Current diet:  ADULT DIET; Dysphagia - Pureed  Tolerating current diet: Yes    Pain Management:   Patient states pain is manageable on current
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
## 2024-07-05 NOTE — PLAN OF CARE
Problem: Physical Therapy - Adult  Goal: By Discharge: Performs mobility at highest level of function for planned discharge setting.  See evaluation for individualized goals.  Description: FUNCTIONAL STATUS PRIOR TO ADMISSION: pt questionable historian, per chart review ambulated with RW at baseline    HOME SUPPORT PRIOR TO ADMISSION: The patient is resident at assisted living facility.    Physical Therapy Goals  Initiated 7/3/2024  1.  Patient will move from supine to sit and sit to supine, scoot up and down, and roll side to side in bed with supervision/set-up within 7 day(s).    2.  Patient will perform sit to stand with contact guard assist within 7 day(s).  3.  Patient will transfer from bed to chair and chair to bed with contact guard assist using the least restrictive device within 7 day(s).  4.  Patient will ambulate with contact guard assist for 10 feet with the least restrictive device within 7 day(s).     Outcome: Progressing   PHYSICAL THERAPY TREATMENT    Patient: Anton Gomes (89 y.o. male)  Date: 7/5/2024  Diagnosis: Dysarthria [R47.1]  Facial droop [R29.810]  Stroke-like symptoms [R29.90]  Altered mental status, unspecified altered mental status type [R41.82] Stroke-like symptoms      Precautions: Fall Risk, Bed Alarm, Seizure                      ASSESSMENT:  Patient continues to benefit from skilled PT services and is progressing towards goals. Barriers to indep with functional mobility include impaired cognition, general weakness, impaired balance, gait instability, increased risk for fall.    Pt pleasant and cooperative, motivated to transfer OOB. Moved supine to sit with SBA and increased time. Required min A x 2 and use of RW for stand pivot transfer to chair. Pt remains appropriate for SNF rehab at d/c.         PLAN:  Patient continues to benefit from skilled intervention to address the above impairments.  Continue treatment per established plan of care.        Recommendation for  discharge: (in order for the patient to meet his/her long term goals): Therapy up to 5 days/week in Skilled nursing facility    Other factors to consider for discharge: impaired cognition, high risk for falls, not safe to be alone, and concern for safely navigating or managing the home environment    IF patient discharges home will need the following DME: continuing to assess with progress       SUBJECTIVE:   Patient without c/o    OBJECTIVE DATA SUMMARY:       Functional Mobility Training:  Bed Mobility:  Bed Mobility Training  Bed Mobility Training: Yes  Supine to Sit: Stand-by assistance;Contact-guard assistance;Additional time (HOB elevated)  Scooting: Stand-by assistance;Additional time (scoot to EOB)  Transfers:  Transfer Training  Transfer Training: Yes  Interventions: Verbal cues;Safety awareness training  Sit to Stand: Minimum assistance;Assist X2 (assist for lift/ balance)  Stand to Sit: Minimum assistance (cues for safe alignment with surface and reaching back)  Bed to Chair: Minimum assistance;Assist X2 (stand pivot with RW, assist for balance/ walker mgmt, cues for sequence/ safety)  Balance:  Balance  Standing: Impaired  Standing - Static: Constant support;Fair  Standing - Dynamic: Constant support;Fair;Poor       Pain Rating:  No c/o pain    Activity Tolerance:   Good    After treatment:   Patient left in no apparent distress sitting up in chair, Call bell within reach, Bed/ chair alarm activated, and lunch tray set up, PCT in attendance      COMMUNICATION/EDUCATION:   The patient's plan of care was discussed with: registered nurse    Patient Education  Education Given To: Patient  Education Provided: Role of Therapy;Plan of Care;Fall Prevention Strategies;Transfer Training;Equipment  Education Method: Verbal  Barriers to Learning: Cognition  Education Outcome: Verbalized understanding;Continued education needed      Jerrica Mckee PT  Minutes: 16

## 2024-07-05 NOTE — PLAN OF CARE
Problem: Occupational Therapy - Adult  Goal: By Discharge: Performs self-care activities at highest level of function for planned discharge setting.  See evaluation for individualized goals.  Description: FUNCTIONAL STATUS PRIOR TO ADMISSION:  LT resident at [facility]. Required assist at baseline, unclear how much assistance staff was providing. Utilized RW for transfers.    , ADL Assistance: Needs assistance,  ,  ,  ,  ,  ,  , Ambulation Assistance: Needs assistance, Transfer Assistance: Needs assistance,       HOME SUPPORT: Patient lived at [facility].    Occupational Therapy Goals:  Initiated 7/3/2024  1.  Patient will perform upper body dressing with Contact Guard Assist within 7 day(s).  2.  Patient will perform grooming with Set-up within 7 day(s).  3.  Patient will perform toilet transfer with Minimal Assist and Assist x1 within 7 day(s).  4.  Patient will perform toileting with Moderate Assist  within 7 day(s).  5.  Patient will complete bed mobility with SBA within 7 day(s).  6.  Patient will be oriented x3 within 7 days.         Outcome: Progressing     OCCUPATIONAL THERAPY TREATMENT  Patient: Anton Gomes (89 y.o. male)  Date: 7/5/2024  Primary Diagnosis: Dysarthria [R47.1]  Facial droop [R29.810]  Stroke-like symptoms [R29.90]  Altered mental status, unspecified altered mental status type [R41.82]       Precautions: Fall Risk, Bed Alarm, Seizure                Chart, occupational therapy assessment, plan of care, and goals were reviewed.    ASSESSMENT  Patient continues to benefit from skilled OT services and is progressing towards goals but remains limited by balance, general weakness, activity tolerance, and cognition (oriented x1). Patient tolerated transfer to chair using RW (vs HHA this date), required max multimodal cues for sequencing, initiation, and safety. Once in chair patient set up to eat lunch, demonstrated ability to manipulate utensil, scoop pureed food, and bring to

## 2024-07-05 NOTE — CARE COORDINATION
Care Management Initial Assessment       RUR: 14% (low RUR)   Readmission? No  1st IM letter given? Yes - Pt access 07/03  1st  letter given: No    Noted pt disoriented, attempted to call primary contact July Freddycecelia. Left message.    Noted pt currently listed as FULL CODE, although there is a no code order on file. CM made MD aware. CM will clarify with family when able to reach them.     Pt is an LTC resident at [facility]. Was needing some assistance at baseline but reportedly was able to transfer with RW. LTC able to accept on return and will start auth for SNF at return.     Will need new notes for auth, PT/OT aware.    Pt plans to discharge back to [facility] under SNF and anticipates Medicaid BLS to assist with transportation.     CM will continue to try reaching family and update.     UPDATE 1522 -  Met with esme at bedside, is in agreement of d/c plan. IMM #2 given. Auth started by facility given.      07/05/24 1100   Service Assessment   Patient Orientation Other (see comment)  (Disoriented at baseline)   Cognition Dementia / Early Alzheimer's   History Provided By Other (see comment)  (Facility)   Primary Caregiver Private caregiver  (LTC at [facility])   Support Systems Family Members;Children   Patient's Healthcare Decision Maker is: Named in Scanned ACP Document   PCP Verified by CM Yes  (Followed by PCP at [facility])   Last Visit to PCP Within last 3 months   Prior Functional Level Assistance with the following:;Bathing;Dressing;Toileting;Housework;Shopping;Mobility;Cooking   Current Functional Level Assistance with the following:;Bathing;Dressing;Toileting;Cooking;Housework;Shopping;Mobility   Can patient return to prior living arrangement No   Ability to make needs known: Poor   Family able to assist with home care needs: Other (comment)  ([facility] staff assists)   Would you like for me to discuss the discharge plan with any other family members/significant others, and if so, who?

## 2024-07-05 NOTE — PROGRESS NOTES
End of Shift Note    Bedside shift change report given to keo rn(oncoming nurse) by Jacqueline Trent, JIGAR .        Shift worked: pm   Shift summary and any significant changes:     No acute events. Pending placement.     Concerns for physician to address:  none   [facility] phone for oncoming shift:   [phone]     Patient Information  Anton Gomes  89 y.o.  7/2/2024 11:22 AM by Thanh Craft MD. 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 ([facility]) 09/05/2018    Depressive disorder 09/04/2018    TIA (transient ischemic attack) 07/24/2014     Past Medical History:   Diagnosis Date    Chronic kidney disease     Dementia (HCC)     Diabetes (HCC)     Gastrointestinal disorder     Glaucoma     Gout     Hypercholesteremia     Hypertension     Other ill-defined conditions(799.89)     gout, glaucoma    Other ill-defined conditions(799.89)     high cholesterol    Seizures (HCC)     Possible seizure 3/6 following administration of fentanyl       Core Measures:  CVA: yes  CHF: no  PNA: no    Activity:  Level of Assistance: Maximum assist, patient does 25-49%  Number times ambulated in hallways past shift: 0  Number of times OOB to chair past shift: 0    Cardiac:   Cardiac Monitoring: no    Access:   Current line(s): PIV    Respiratory:   O2 Device: None (Room air)    GI:  Last BM (including prior to admit): 07/02/24  Current diet:  ADULT DIET; Dysphagia - Pureed  Tolerating current diet: Yes    Pain Management:   Patient states pain is manageable on current regimen: yes    Skin:  Chaka Scale Score: 14  Interventions: turn  q2  Pressure injury: no    Patient Safety:  Fall Score: Howard Total Score: 80  Interventions: bed alarm  Self-release roll belt: No  Dexterity to release roll belt: N/A   (must document dexterity  here by stating Yes or No here, otherwise this is a restraint and must follow restraint documentation policy.)    DVT prophylaxis:  DVT prophylaxis: meds    Active Consults:  IP CONSULT TO TELE-NEUROLOGY  IP CONSULT TO HOSPITALIST  IP CONSULT TO NEUROLOGY  IP CONSULT TO CASE MANAGEMENT    Length of Stay:  Expected LOS: 3  Actual LOS: 3    Jacqueline Trent RN

## 2024-07-06 LAB
EKG ATRIAL RATE: 80 BPM
EKG DIAGNOSIS: NORMAL
EKG P AXIS: 74 DEGREES
EKG P-R INTERVAL: 222 MS
EKG Q-T INTERVAL: 390 MS
EKG QRS DURATION: 68 MS
EKG QTC CALCULATION (BAZETT): 460 MS
EKG R AXIS: 31 DEGREES
EKG T AXIS: 44 DEGREES
EKG VENTRICULAR RATE: 84 BPM
GLUCOSE BLD STRIP.AUTO-MCNC: 159 MG/DL (ref 65–117)
GLUCOSE BLD STRIP.AUTO-MCNC: 204 MG/DL (ref 65–117)
GLUCOSE BLD STRIP.AUTO-MCNC: 271 MG/DL (ref 65–117)
GLUCOSE BLD STRIP.AUTO-MCNC: 330 MG/DL (ref 65–117)
SERVICE CMNT-IMP: ABNORMAL

## 2024-07-06 PROCEDURE — 82962 GLUCOSE BLOOD TEST: CPT

## 2024-07-06 PROCEDURE — 6370000000 HC RX 637 (ALT 250 FOR IP): Performed by: INTERNAL MEDICINE

## 2024-07-06 PROCEDURE — 2580000003 HC RX 258: Performed by: INTERNAL MEDICINE

## 2024-07-06 PROCEDURE — 1100000003 HC PRIVATE W/ TELEMETRY

## 2024-07-06 PROCEDURE — 6360000002 HC RX W HCPCS: Performed by: INTERNAL MEDICINE

## 2024-07-06 RX ADMIN — Medication: at 09:50

## 2024-07-06 RX ADMIN — INSULIN LISPRO 4 UNITS: 100 INJECTION, SOLUTION INTRAVENOUS; SUBCUTANEOUS at 21:20

## 2024-07-06 RX ADMIN — ENOXAPARIN SODIUM 30 MG: 100 INJECTION SUBCUTANEOUS at 09:49

## 2024-07-06 RX ADMIN — INSULIN LISPRO 4 UNITS: 100 INJECTION, SOLUTION INTRAVENOUS; SUBCUTANEOUS at 11:27

## 2024-07-06 RX ADMIN — ASPIRIN 81 MG: 81 TABLET, CHEWABLE ORAL at 09:49

## 2024-07-06 RX ADMIN — SODIUM CHLORIDE, PRESERVATIVE FREE 10 ML: 5 INJECTION INTRAVENOUS at 09:49

## 2024-07-06 RX ADMIN — Medication: at 20:31

## 2024-07-06 RX ADMIN — ATORVASTATIN CALCIUM 20 MG: 20 TABLET, FILM COATED ORAL at 20:30

## 2024-07-06 RX ADMIN — INSULIN LISPRO 2 UNITS: 100 INJECTION, SOLUTION INTRAVENOUS; SUBCUTANEOUS at 09:49

## 2024-07-06 RX ADMIN — SODIUM CHLORIDE, PRESERVATIVE FREE 10 ML: 5 INJECTION INTRAVENOUS at 20:30

## 2024-07-06 ASSESSMENT — PAIN SCALES - GENERAL: PAINLEVEL_OUTOF10: 0
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
## 2024-07-06 NOTE — PROGRESS NOTES
End of Shift Note    Bedside shift change report given to JIGAR Phillips (oncoming nurse) by Gardenia Whitfield RN (offgoing nurse).  Report included the following information SBAR, Kardex, ED Summary, Intake/Output, and MAR    Shift worked: Days     Shift summary and any significant changes:    Q2 turns documented  Case management following for placement needs   Concerns for physician to address:  Dc plan     Zone phone for oncoming shift:     [phone]     Activity:     Number times ambulated in hallways past shift: 0  Number of times OOB to chair past shift: 0    Cardiac:   Cardiac Monitoring: No           Access:  Current line(s): PIV     Genitourinary:   Urinary status: voiding and incontinent    Respiratory:      Chronic home O2 use?: NO  Incentive spirometer at bedside: N/A       GI:     Current diet:  ADULT DIET; Dysphagia - Pureed  Passing flatus: YES  Tolerating current diet: YES       Pain Management:   Patient states pain is manageable on current regimen: N/A    Skin:     Interventions: float heels, internal/external urinary devices, and nutritional support    Patient Safety:  Fall Score:    Interventions: bed/chair alarm, assistive device (walker, cane. etc), gripper socks, pt to call before getting OOB, stay with me (per policy), and gait belt       Length of Stay:  Expected LOS: 6  Actual LOS: 4      Gardenia Whitfield RN

## 2024-07-06 NOTE — PROGRESS NOTES
End of Shift Note    Bedside shift change report given to Gardenia KIMBALL  (oncoming nurse) by ESSENCE CH RN (offgoing nurse).  Report included the following information SBAR, Kardex, ED Summary, Intake/Output, and MAR    Shift worked:  7p-7a     Shift summary and any significant changes:     Slept and turned self ovenight .   Incont. Of urine       Concerns for physician to address:  Dc plan     Zone phone for oncoming shift:          Activity:     Number times ambulated in hallways past shift: 0  Number of times OOB to chair past shift: 0    Cardiac:   Cardiac Monitoring: No           Access:  Current line(s): PIV     Genitourinary:   Urinary status: voiding and incontinent    Respiratory:      Chronic home O2 use?: NO  Incentive spirometer at bedside: N/A       GI:     Current diet:  ADULT DIET; Dysphagia - Pureed  Passing flatus: YES  Tolerating current diet: YES       Pain Management:   Patient states pain is manageable on current regimen: N/A    Skin:     Interventions: float heels, internal/external urinary devices, and nutritional support    Patient Safety:  Fall Score:    Interventions: bed/chair alarm, assistive device (walker, cane. etc), gripper socks, pt to call before getting OOB, stay with me (per policy), and gait belt       Length of Stay:  Expected LOS: 4  Actual LOS: 4      ESSENCE CH RN

## 2024-07-07 LAB
GLUCOSE BLD STRIP.AUTO-MCNC: 173 MG/DL (ref 65–117)
GLUCOSE BLD STRIP.AUTO-MCNC: 188 MG/DL (ref 65–117)
GLUCOSE BLD STRIP.AUTO-MCNC: 302 MG/DL (ref 65–117)
GLUCOSE BLD STRIP.AUTO-MCNC: 311 MG/DL (ref 65–117)
SERVICE CMNT-IMP: ABNORMAL

## 2024-07-07 PROCEDURE — 6370000000 HC RX 637 (ALT 250 FOR IP): Performed by: INTERNAL MEDICINE

## 2024-07-07 PROCEDURE — 6360000002 HC RX W HCPCS: Performed by: INTERNAL MEDICINE

## 2024-07-07 PROCEDURE — 82962 GLUCOSE BLOOD TEST: CPT

## 2024-07-07 PROCEDURE — 2580000003 HC RX 258: Performed by: INTERNAL MEDICINE

## 2024-07-07 PROCEDURE — 1100000003 HC PRIVATE W/ TELEMETRY

## 2024-07-07 RX ADMIN — ATORVASTATIN CALCIUM 20 MG: 20 TABLET, FILM COATED ORAL at 20:18

## 2024-07-07 RX ADMIN — INSULIN LISPRO 4 UNITS: 100 INJECTION, SOLUTION INTRAVENOUS; SUBCUTANEOUS at 20:57

## 2024-07-07 RX ADMIN — Medication: at 20:20

## 2024-07-07 RX ADMIN — SODIUM CHLORIDE, PRESERVATIVE FREE 10 ML: 5 INJECTION INTRAVENOUS at 08:13

## 2024-07-07 RX ADMIN — LORAZEPAM 1 MG: 2 INJECTION INTRAMUSCULAR; INTRAVENOUS at 20:18

## 2024-07-07 RX ADMIN — INSULIN LISPRO 6 UNITS: 100 INJECTION, SOLUTION INTRAVENOUS; SUBCUTANEOUS at 11:54

## 2024-07-07 RX ADMIN — ENOXAPARIN SODIUM 30 MG: 100 INJECTION SUBCUTANEOUS at 08:12

## 2024-07-07 RX ADMIN — ASPIRIN 81 MG: 81 TABLET, CHEWABLE ORAL at 08:13

## 2024-07-07 RX ADMIN — Medication: at 08:16

## 2024-07-07 RX ADMIN — SODIUM CHLORIDE, PRESERVATIVE FREE 10 ML: 5 INJECTION INTRAVENOUS at 20:20

## 2024-07-07 NOTE — PROGRESS NOTES
Spiritual Care Assessment/Progress Note  [facility]    Name: Anton Gomes MRN: [id number]    Age: 89 y.o.     Sex: male   Language: English     Date: 7/7/2024            Total Time Calculated: 10 min              Spiritual Assessment begun in [facility] NEUROSCIENCE TELEMETRY  Service Provided For: Patient  Referral/Consult From: Rounding  Encounter Overview/Reason: Initial Encounter    Spiritual beliefs:      [] Involved in a kd tradition/spiritual practice:      [] Supported by a kd community:      [] Claims no spiritual orientation:      [] Seeking spiritual identity:           [] Adheres to an individual form of spirituality:      [x] Not able to assess:                Identified resources for coping and support system:   Support System: Family members, Friends/neighbors       [] Prayer                  [] Devotional reading               [] Music                  [] Guided Imagery     [] Pet visits                                        [] Other: (COMMENT)     Specific area/focus of visit   Encounter:    Crisis:    Spiritual/Emotional needs: Type: Spiritual Support  Ritual, Rites and Sacraments:    Grief, Loss, and Adjustments:    Ethics/Mediation:    Behavioral Health:    Palliative Care:    Advance Care Planning:      Plan/Referrals: Other (Comment) (Please contact Spiritual Care Services for further consults.)    Narrative:  visited pt on rounds in Neuro Tele Unit.  Patient was present during the visit.   introduced himself and asked if the pt wanted a visit. Pt welcomed  in and shared about his medical history and family situation. Pt appeared to struggle with verbal communication and often repeated phrases. Pt noted that he was \"doing just fine\" and that his nephew visits often.   provided active listening and ministry of presence.  advised pt of further  availability. Pt expressed gratitude at the visit and told the  to

## 2024-07-07 NOTE — PROGRESS NOTES
End of Shift Note    Bedside shift change report given to JIGAR Phillips(oncoming nurse) by Gardenia Whitfield RN (offgoing nurse).  Report included the following information SBAR, Kardex, ED Summary, Intake/Output, and MAR    Shift worked: Days     Shift summary and any significant changes:    Q2 turns documented  Case management following for placement needs   Concerns for physician to address:    Zone phone for oncoming shift:     [phone]     Activity:     Number times ambulated in hallways past shift: 0  Number of times OOB to chair past shift: 0    Cardiac:   Cardiac Monitoring: No           Access:  Current line(s): PIV     Genitourinary:   Urinary status: voiding and incontinent/manwick    Respiratory:      Chronic home O2 use?: NO  Incentive spirometer at bedside: N/A       GI:     Current diet:  ADULT DIET; Dysphagia - Pureed  Passing flatus: YES  Tolerating current diet: YES       Pain Management:   Patient states pain is manageable on current regimen: N/A    Skin:     Interventions: float heels, internal/external urinary devices, and nutritional support    Patient Safety:  Fall Score:    Interventions: bed/chair alarm, assistive device (walker, cane. etc), gripper socks, pt to call before getting OOB, stay with me (per policy), and gait belt       Length of Stay:  Expected LOS: 6  Actual LOS: 5      Gardenia Whitfield RN

## 2024-07-07 NOTE — PLAN OF CARE
Problem: Discharge Planning  Goal: Discharge to home or other facility with appropriate resources  7/6/2024 2201 by Jacqueline Trent RN  Outcome: Progressing  7/6/2024 1039 by Gardenia Whitfield RN  Outcome: Progressing     Problem: Pain  Goal: Verbalizes/displays adequate comfort level or baseline comfort level  7/6/2024 2201 by Jacqueline Trent RN  Outcome: Progressing  Flowsheets (Taken 7/6/2024 2028)  Verbalizes/displays adequate comfort level or baseline comfort level:   Encourage patient to monitor pain and request assistance   Assess pain using appropriate pain scale   Administer analgesics based on type and severity of pain and evaluate response   Implement non-pharmacological measures as appropriate and evaluate response   Consider cultural and social influences on pain and pain management   Notify Licensed Independent Practitioner if interventions unsuccessful or patient reports new pain  7/6/2024 1039 by Gardenia Whitfield RN  Outcome: Progressing     Problem: Skin/Tissue Integrity  Goal: Absence of new skin breakdown  Description: 1.  Monitor for areas of redness and/or skin breakdown  2.  Assess vascular access sites hourly  3.  Every 4-6 hours minimum:  Change oxygen saturation probe site  4.  Every 4-6 hours:  If on nasal continuous positive airway pressure, respiratory therapy assess nares and determine need for appliance change or resting period.  7/6/2024 2201 by Jacqueline Trent RN  Outcome: Progressing  7/6/2024 1039 by Gardenia Whitfield RN  Outcome: Progressing     Problem: ABCDS Injury Assessment  Goal: Absence of physical injury  7/6/2024 2201 by Jacqueline Trent, RN  Outcome: Progressing  7/6/2024 1039 by Gardenia Whitfield RN  Outcome: Progressing  Flowsheets (Taken 7/6/2024 0101 by Rosie Saavedra, RN)  Absence of Physical Injury: Implement safety measures based on patient assessment     Problem: Safety - Adult  Goal: Free from fall injury  7/6/2024 2201 by Jacqueline Trent RN  Outcome:  Progressing  7/6/2024 1039 by Gardenia Whitfield RN  Outcome: Progressing  Flowsheets (Taken 7/6/2024 0101 by Rosie Saavedra, RN)  Free From Fall Injury:   Based on caregiver fall risk screen, instruct family/caregiver to ask for assistance with transferring infant if caregiver noted to have fall risk factors   Instruct family/caregiver on patient safety     Problem: Neurosensory - Adult  Goal: Achieves stable or improved neurological status  7/6/2024 2201 by Jacqueline Trent RN  Outcome: Progressing  7/6/2024 1039 by Gardenia Whitfield RN  Outcome: Progressing  Goal: Absence of seizures  7/6/2024 2201 by Jacqueline Trent RN  Outcome: Progressing  7/6/2024 1039 by Gardenia Whitfield RN  Outcome: Progressing  Goal: Remains free of injury related to seizures activity  7/6/2024 2201 by Jacqueline Trent RN  Outcome: Progressing  7/6/2024 1039 by Gardenia Whitfield RN  Outcome: Progressing  Goal: Achieves maximal functionality and self care  7/6/2024 2201 by Jacqueline Trent RN  Outcome: Progressing  7/6/2024 1039 by Gardenia Whitfield RN  Outcome: Progressing     Problem: Chronic Conditions and Co-morbidities  Goal: Patient's chronic conditions and co-morbidity symptoms are monitored and maintained or improved  7/6/2024 2201 by Jacqueline Trent RN  Outcome: Progressing  7/6/2024 1039 by Gardenia Whitfield RN  Outcome: Progressing
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
## 2024-07-07 NOTE — CARE COORDINATION
Transition of Care Plan:    RUR: 12%  Prior Level of Functioning: Independent to Assistance   Disposition: SNF-LTC at [facility]   If SNF or IPR: Date FOC offered: 7/5/24  Date FOC received: 7/5/24  Accepting facility: [facility]   Date authorization started with reference number: 7/5/24  Date authorization received and expires: Pending   Follow up appointments: Defer to facility   DME needed: Defer to facility   Transportation at discharge: Medicaid transport  IM/IMM Medicare/ letter given: Needs 2nd [facility] Letter   Is patient a [place] and connected with VA? No   If yes, was  transfer form completed and VA notified? No  Caregiver Contact: July Mon   Discharge Caregiver contacted prior to discharge? [facility]   Care Conference needed? None   Barriers to discharge: Auth and bed       CM called [facility] regarding if pt could return back to facility but CM was informed that nobody was in admission and that CM will have to follow back up on Monday.    Chitra Escobedo  Weekend   [phone]

## 2024-07-07 NOTE — PROGRESS NOTES
End of Shift Note    Bedside shift change report given to patricia velez(oncoming nurse) by Jacqueline Trent RN (offgoing nurse).  Report included the following information SBAR, Kardex, ED Summary, Intake/Output, and MAR    Shift worked: pm     Shift summary and any significant changes:    Q2 turns documented  Case management following for placement needs   Concerns for physician to address:  Dc plan     Zone phone for oncoming shift:     [phone]     Activity:     Number times ambulated in hallways past shift: 0  Number of times OOB to chair past shift: 0    Cardiac:   Cardiac Monitoring: No           Access:  Current line(s): PIV     Genitourinary:   Urinary status: voiding and incontinent/manwick    Respiratory:      Chronic home O2 use?: NO  Incentive spirometer at bedside: N/A       GI:     Current diet:  ADULT DIET; Dysphagia - Pureed  Passing flatus: YES  Tolerating current diet: YES       Pain Management:   Patient states pain is manageable on current regimen: N/A    Skin:     Interventions: float heels, internal/external urinary devices, and nutritional support    Patient Safety:  Fall Score:    Interventions: bed/chair alarm, assistive device (walker, cane. etc), gripper socks, pt to call before getting OOB, stay with me (per policy), and gait belt       Length of Stay:  Expected LOS: 6  Actual LOS: 5      Jacqueline Trent, RN

## 2024-07-08 LAB
GLUCOSE BLD STRIP.AUTO-MCNC: 237 MG/DL (ref 65–117)
GLUCOSE BLD STRIP.AUTO-MCNC: 240 MG/DL (ref 65–117)
GLUCOSE BLD STRIP.AUTO-MCNC: 275 MG/DL (ref 65–117)
GLUCOSE BLD STRIP.AUTO-MCNC: 320 MG/DL (ref 65–117)
SERVICE CMNT-IMP: ABNORMAL

## 2024-07-08 PROCEDURE — 92526 ORAL FUNCTION THERAPY: CPT

## 2024-07-08 PROCEDURE — 1100000003 HC PRIVATE W/ TELEMETRY

## 2024-07-08 PROCEDURE — 6360000002 HC RX W HCPCS: Performed by: INTERNAL MEDICINE

## 2024-07-08 PROCEDURE — 82962 GLUCOSE BLOOD TEST: CPT

## 2024-07-08 PROCEDURE — 6370000000 HC RX 637 (ALT 250 FOR IP): Performed by: INTERNAL MEDICINE

## 2024-07-08 PROCEDURE — 97530 THERAPEUTIC ACTIVITIES: CPT

## 2024-07-08 PROCEDURE — 92507 TX SP LANG VOICE COMM INDIV: CPT

## 2024-07-08 PROCEDURE — 97116 GAIT TRAINING THERAPY: CPT

## 2024-07-08 PROCEDURE — 2580000003 HC RX 258: Performed by: INTERNAL MEDICINE

## 2024-07-08 RX ADMIN — ENOXAPARIN SODIUM 30 MG: 100 INJECTION SUBCUTANEOUS at 09:19

## 2024-07-08 RX ADMIN — SODIUM CHLORIDE, PRESERVATIVE FREE 10 ML: 5 INJECTION INTRAVENOUS at 09:21

## 2024-07-08 RX ADMIN — ASPIRIN 81 MG: 81 TABLET, CHEWABLE ORAL at 09:20

## 2024-07-08 RX ADMIN — INSULIN LISPRO 4 UNITS: 100 INJECTION, SOLUTION INTRAVENOUS; SUBCUTANEOUS at 12:00

## 2024-07-08 RX ADMIN — ATORVASTATIN CALCIUM 20 MG: 20 TABLET, FILM COATED ORAL at 21:50

## 2024-07-08 RX ADMIN — Medication: at 21:50

## 2024-07-08 RX ADMIN — SODIUM CHLORIDE, PRESERVATIVE FREE 10 ML: 5 INJECTION INTRAVENOUS at 21:50

## 2024-07-08 RX ADMIN — Medication: at 09:20

## 2024-07-08 RX ADMIN — INSULIN LISPRO 2 UNITS: 100 INJECTION, SOLUTION INTRAVENOUS; SUBCUTANEOUS at 09:20

## 2024-07-08 RX ADMIN — INSULIN LISPRO 4 UNITS: 100 INJECTION, SOLUTION INTRAVENOUS; SUBCUTANEOUS at 21:55

## 2024-07-08 RX ADMIN — INSULIN LISPRO 2 UNITS: 100 INJECTION, SOLUTION INTRAVENOUS; SUBCUTANEOUS at 17:13

## 2024-07-08 NOTE — PROGRESS NOTES
End of Shift Note    Bedside shift change report given to JIGAR Guerra(oncoming nurse) by Melania Han RN (offgoing nurse).  Report included the following information SBAR, Kardex, ED Summary, Intake/Output, and MAR    Shift worked: nights     Shift summary and any significant changes:    Q2 turns documented  Case management following for placement needs  Venelex applied     Concerns for physician to address:    Zone phone for oncoming shift:     [phone]     Activity:     Number times ambulated in hallways past shift: 0  Number of times OOB to chair past shift: 0    Cardiac:   Cardiac Monitoring: No           Access:  Current line(s): PIV     Genitourinary:   Urinary status: voiding and incontinent/manwick    Respiratory:      Chronic home O2 use?: NO  Incentive spirometer at bedside: N/A       GI:     Current diet:  ADULT DIET; Dysphagia - Pureed  Passing flatus: YES  Tolerating current diet: YES       Pain Management:   Patient states pain is manageable on current regimen: N/A    Skin:     Interventions: float heels, internal/external urinary devices, and nutritional support    Patient Safety:  Fall Score:    Interventions: bed/chair alarm, assistive device (walker, cane. etc), gripper socks, pt to call before getting OOB, stay with me (per policy), and gait belt       Length of Stay:  Expected LOS: 6  Actual LOS: 6      Melania Han RN

## 2024-07-08 NOTE — PROGRESS NOTES
End of Shift Note    Bedside shift change report given to Alexandre (oncoming nurse) by JIGAR Guerra .        Shift worked:  [id number]   Shift summary and any significant changes:     Q2hr turns/repositioning maintained. No acute changes.       Concerns for physician to address:  NA   Zone phone for oncoming shift:        Patient Information  Anton Gomes  89 y.o.  7/2/2024 11:22 AM by Thanh Craft MD. 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    Depressive disorder 09/04/2018    TIA (transient ischemic attack) 07/24/2014     Past Medical History:   Diagnosis Date    Chronic kidney disease     Dementia (HCC)     Diabetes (HCC)     Gastrointestinal disorder     Glaucoma     Gout     Hypercholesteremia     Hypertension     Other ill-defined conditions(799.89)     gout, glaucoma    Other ill-defined conditions(799.89)     high cholesterol    Seizures (HCC)     Possible seizure 3/6 following administration of fentanyl       Core Measures:  CVA: yes  CHF: no  PNA: no    Activity:  Level of Assistance: Maximum assist, patient does 25-49%  Number times ambulated in hallways past shift: 0  Number of times OOB to chair past shift: 2    Cardiac:   Cardiac Monitoring: no    Access:   Current line(s): PIV    Respiratory:   O2 Device: None (Room air)    GI:  Last BM (including prior to admit): 07/05/24  Current diet:  ADULT DIET; Dysphagia - Minced and Moist  Tolerating current diet: Yes    Pain Management:   Patient states pain is manageable on current regimen: yes    Skin:  Chaka Scale Score:

## 2024-07-08 NOTE — CARE COORDINATION
Transition of Care Plan:     RUR: 12% (low RUR)   Prior Level of Functioning: LTC level at [facility]  Disposition: SNF-LTC at [facility]   If SNF or IPR: Date FOC offered: 7/5/24  Date FOC received: 7/5/24  Accepting facility: [facility]   Date authorization started with reference number: Started by facility 7/5/24, pending reference # [id number]   Date authorization received and expires: Pending   Follow up appointments: Defer to facility   DME needed: Defer to facility   Transportation at discharge: Medicaid transport  IM/IMM Medicare/ letter given: 2nd given 07/08  Is patient a [place] and connected with VA? No              If yes, was  transfer form completed and VA notified? No  Caregiver Contact: July Mon   Discharge Caregiver contacted prior to discharge? [facility] and July Mon   Care Conference needed? None   Barriers to discharge: Auth and bed    0837 - Chart review complete. Plan to d/c to [facility] under SNF benefits before returning to LTC. CM contacted facility requesting updates on auth. Left message requesting a call back with updates.     1132 - Left additional message for facility requesting update on auth.     AMPARO Pate  Care Management  [facility]  [phone]
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
## 2024-07-08 NOTE — PLAN OF CARE
Problem: Physical Therapy - Adult  Goal: By Discharge: Performs mobility at highest level of function for planned discharge setting.  See evaluation for individualized goals.  Description: FUNCTIONAL STATUS PRIOR TO ADMISSION: pt questionable historian, per chart review ambulated with RW at baseline    HOME SUPPORT PRIOR TO ADMISSION: The patient is resident at assisted living facility.    Physical Therapy Goals  Initiated 7/3/2024  1.  Patient will move from supine to sit and sit to supine, scoot up and down, and roll side to side in bed with supervision/set-up within 7 day(s).    2.  Patient will perform sit to stand with contact guard assist within 7 day(s).  3.  Patient will transfer from bed to chair and chair to bed with contact guard assist using the least restrictive device within 7 day(s).  4.  Patient will ambulate with contact guard assist for 10 feet with the least restrictive device within 7 day(s).     Outcome: Progressing   PHYSICAL THERAPY TREATMENT    Patient: Anton Gomes (89 y.o. male)  Date: 7/8/2024  Diagnosis: Dysarthria [R47.1]  Facial droop [R29.810]  Stroke-like symptoms [R29.90]  Altered mental status, unspecified altered mental status type [R41.82] Stroke-like symptoms      Precautions: Fall Risk, Bed Alarm, Seizure                      ASSESSMENT:  Patient continues to benefit from skilled PT services and is progressing towards goals. Pt received semi fowlers in bed, agreeable to participate in therapy. Pt demonstrating significant drowsiness this session with difficulty attending to tasks. BP stable in all positions. Fair tolerance to activity today. Noted to have heavy posterior lean with STS and ambulation, required manual cues for correction due to inability to self correct. Instructed pt on LE and UE HEP, difficultly with command following and sequencing, required manual cues for performance. Pt continues to perform below baseline, demonstrating impairments with cognition,  (comment) (Poor eccentric control)  Bed to Chair: Moderate assistance;Additional time;Other (comment) (RW)  Balance:  Balance  Sitting: Intact  Standing: Impaired  Standing - Static: Constant support;Fair  Standing - Dynamic: Constant support;Fair;Poor (heavy posterior lean)   Ambulation/Gait Training:     Gait  Gait Training: Yes  Overall Level of Assistance: Moderate assistance;Additional time  Assistive Device: Gait belt;Walker, rolling  Interventions: Safety awareness training;Verbal cues  Base of Support: Center of gravity altered;Widened  Speed/Leah: Pace decreased (< 100 feet/min)  Step Length: Left shortened;Right shortened  Gait Abnormalities: Decreased step clearance        Neuro Re-Education:                    Pain Rating:    Pain Intervention(s):       Activity Tolerance:   Fair     After treatment:   Patient left in no apparent distress sitting up in chair, Call bell within reach, Bed/ chair alarm activated, and Updated patient's board on functional status and mobility recommendations      COMMUNICATION/EDUCATION:   The patient's plan of care was discussed with: registered nurse    Patient Education  Education Given To: Patient  Education Provided: Role of Therapy;Plan of Care;Transfer Training;Home Exercise Program;Fall Prevention Strategies  Education Method: Verbal  Barriers to Learning: None  Education Outcome: Verbalized understanding;Continued education needed      Ebony Beckham PTA  Minutes: 24

## 2024-07-08 NOTE — PLAN OF CARE
Speech LAnguage Pathology TREATMENT    Patient: Anton Gomes (89 y.o. male)  Date: 7/8/2024  Primary Diagnosis: Dysarthria [R47.1]  Facial droop [R29.810]  Stroke-like symptoms [R29.90]  Altered mental status, unspecified altered mental status type [R41.82]       Precautions:  Fall Risk, Bed Alarm, Seizure                  ASSESSMENT :  Patient received upright in bed with breakfast tray untouched on this date. Patient benefited from hand-over-hand assistance on this date for self-feeding. Patient observed to take rapid, sequential sips of thin liquid without overt difficulty. Intermittent mild belching present, suspect esophageal in nature. Patient with timely oral transit and efficient oral clearance with pureed consistencies on tray on this date.    Patient's dysarthria continues to improve, increasing patient's intelligibility at the conversation level. Patient's motor speech continues to be characterized by blended word boundaries and imprecise articulation. Patient benefits from cues for slow rate of speech.    Patient will benefit from skilled intervention to address the above impairments.     PLAN :  Recommendations and Planned Interventions:  Diet: Minced and moist and thin liquids  -- Medication whole in puree (applesauce, pudding)  -- 1:1 supervision with PO intake as needed  -- Encourage self-feeding  -- Aspiration precautions: upright for all PO, small bites/sips, slow rate of intake, alternate liquids and purees, ensure oral cavity clearance after meals     S.L.O.B.  Slow down (slow your rate of speech down)  Loud volume (talking louder makes it easier for others to understand)  Over-articulate (opening your mouth wider, using a large oral cavity, to over-enunciate the word)  Breath support (take a deep, diaphragmatic breath prior to speaking)    Recommend next SLP session: diet tolerance, determine appropriateness for diet advancement, dysarthria treatment    Acute SLP Services: Yes, SLP will  impairment  Propulsion: No impairment  Oral Residue: None  Aspiration Signs/Symptoms: None  Comments: Intermittent mild belching observed following PO intake, suspect esophageal in nature            Motor Speech:  Motor Speech  Dysarthric Characteristics: Blended word boundaries;Imprecise  Intelligibility: Impaired  Conversation Intelligibility (%): 75 %  Overall Impairment Severity: Mild    S.L.O.B.  Slow down (slow your rate of speech down)  Loud volume (talking louder makes it easier for others to understand)  Over-articulate (opening your mouth wider, using a large oral cavity, to over-enunciate the word)  Breath support (take a deep, diaphragmatic breath prior to speaking)    Respiratory Status/Airway:  Room air                         After treatment:   Patient left in no apparent distress in bed, Call bell left within reach, and Nursing notified    COMMUNICATION/EDUCATION:   The patient's plan of care including recommendations, planned interventions, and recommended diet changes were discussed with: Registered nurse    Patient/family have participated as able in goal setting and plan of care and Patient/family agree to work toward stated goals and plan of care    Thank you,  Buzz Jarrett, SLP    SLP Individual Minutes  Time In: 0910  Time Out: 0945  Minutes: 35      Problem: SLP Adult - Impaired Swallowing  Goal: By Discharge: Advance to least restrictive diet without signs or symptoms of aspiration for planned discharge setting.  See evaluation for individualized goals.  Description: Speech Pathology Goals  Initiated 7/3/2024    1. Patient will participate in FEES to further assess pharyngeal swallow function within 7 days. MET 7/3/2024  2. Patient will increase motor speech intelligibility to 80% at the conversation level, given minimal-moderate verbal cueing, within 7 days.  3. Patient will tolerate least restrictive diet without overt signs of aspiration or decline in respiratory status within 7

## 2024-07-08 NOTE — PLAN OF CARE
Problem: Discharge Planning  Goal: Discharge to home or other facility with appropriate resources  7/7/2024 2045 by Melania Han RN  Outcome: Progressing  Flowsheets (Taken 7/7/2024 2035)  Discharge to home or other facility with appropriate resources: Identify barriers to discharge with patient and caregiver  7/7/2024 1045 by Gardenia Whitfield RN  Outcome: Progressing     Problem: Pain  Goal: Verbalizes/displays adequate comfort level or baseline comfort level  7/7/2024 2045 by Melania Han RN  Outcome: Progressing  7/7/2024 1045 by Gardenia Whitfield RN  Outcome: Progressing     Problem: Skin/Tissue Integrity  Goal: Absence of new skin breakdown  Description: 1.  Monitor for areas of redness and/or skin breakdown  2.  Assess vascular access sites hourly  3.  Every 4-6 hours minimum:  Change oxygen saturation probe site  4.  Every 4-6 hours:  If on nasal continuous positive airway pressure, respiratory therapy assess nares and determine need for appliance change or resting period.  7/7/2024 2045 by Melania Han RN  Outcome: Progressing  7/7/2024 1045 by Gardenia Whitfield RN  Outcome: Progressing     Problem: ABCDS Injury Assessment  Goal: Absence of physical injury  7/7/2024 2045 by Melania Han RN  Outcome: Progressing  Flowsheets (Taken 7/7/2024 2035)  Absence of Physical Injury: Implement safety measures based on patient assessment  7/7/2024 1045 by Gardenia Whitfield RN  Outcome: Progressing     Problem: Safety - Adult  Goal: Free from fall injury  7/7/2024 2045 by Melania Han RN  Outcome: Progressing  Flowsheets (Taken 7/7/2024 2035)  Free From Fall Injury: Instruct family/caregiver on patient safety  7/7/2024 1045 by Gardenia Whitfield RN  Outcome: Progressing     Problem: Neurosensory - Adult  Goal: Achieves stable or improved neurological status  7/7/2024 2045 by Melania Han RN  Outcome: Progressing  7/7/2024 1045 by Gardenia Whitfield RN  Outcome: Progressing  Goal: Absence of

## 2024-07-09 VITALS
RESPIRATION RATE: 18 BRPM | SYSTOLIC BLOOD PRESSURE: 125 MMHG | TEMPERATURE: 97.9 F | HEART RATE: 78 BPM | DIASTOLIC BLOOD PRESSURE: 79 MMHG | BODY MASS INDEX: 24.29 KG/M2 | OXYGEN SATURATION: 100 % | HEIGHT: 68 IN | WEIGHT: 160.27 LBS

## 2024-07-09 LAB
GLUCOSE BLD STRIP.AUTO-MCNC: 161 MG/DL (ref 65–117)
GLUCOSE BLD STRIP.AUTO-MCNC: 181 MG/DL (ref 65–117)
GLUCOSE BLD STRIP.AUTO-MCNC: 222 MG/DL (ref 65–117)
SERVICE CMNT-IMP: ABNORMAL

## 2024-07-09 PROCEDURE — 6370000000 HC RX 637 (ALT 250 FOR IP): Performed by: INTERNAL MEDICINE

## 2024-07-09 PROCEDURE — 6360000002 HC RX W HCPCS: Performed by: INTERNAL MEDICINE

## 2024-07-09 PROCEDURE — 82962 GLUCOSE BLOOD TEST: CPT

## 2024-07-09 RX ADMIN — ASPIRIN 81 MG: 81 TABLET, CHEWABLE ORAL at 09:50

## 2024-07-09 RX ADMIN — INSULIN LISPRO 2 UNITS: 100 INJECTION, SOLUTION INTRAVENOUS; SUBCUTANEOUS at 12:03

## 2024-07-09 RX ADMIN — ENOXAPARIN SODIUM 30 MG: 100 INJECTION SUBCUTANEOUS at 09:51

## 2024-07-09 RX ADMIN — Medication: at 09:50
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
## 2024-07-09 NOTE — CARE COORDINATION
SNF - [facility]. Bed # 209, call report to [phone].  [facility]  scheduled for 17[facility].    Transition of Care Plan:     RUR: 12% (low RUR)   Prior Level of Functioning: LTC level at [facility]  Disposition: SNF-LTC at [facility]   If SNF or IPR: Date FOC offered: 7/5/24  Date FOC received: 7/5/24  Accepting facility: [facility]   Date authorization started with reference number: Started by facility 7/5/24, pending reference # [id number]   Date authorization received and expires: Auth approved 07/09  Follow up appointments: Defer to facility   DME needed: Defer to facility   Transportation at discharge: [facility] @ 1700  IM/IMM Medicare/ letter given: 2nd given 07/08  Is patient a  and connected with [facility]? No              If yes, was [place] transfer form completed and VA notified? No  Caregiver Contact: esme Anderson, [phone]  Discharge Caregiver contacted prior to discharge? Cm contacted 07/09  Care Conference needed? None   Barriers to discharge: None    0829 - Auth still pending at this time. CM requesting continued updates from facility.     1217 - Auth approved, [facility] ready today. [facility]  scheduled for 1700. Transport packet complete. Family and facility aware.     Transition of Care Plan to SNF/Rehab    Communication to Patient/Family:  Met with patient and family and they are agreeable to the transition plan. The Plan for Transition of Care is related to the following treatment goals: [facility] SNF    The Patient and/or patient representative was provided with a choice of provider and agrees  with the discharge plan.      Yes [x] No []    A Freedom of choice list was provided with basic dialogue that supports the patient's individualized plan of care/goals and shares the quality data associated with the providers.       Yes [x] No []    SNF/Rehab Transition:  Patient has been accepted to [facility] SNF/Rehab and meets criteria for admission.   Patient will  transported by [facility] and expected to leave at 1700.    Communication to SNF/Rehab:  Bedside RN, Florencia , has been notified to update the transition plan to the facility and call report (phone number).  Discharge information has been updated on the AVS. And communicated to facility via [employer], or CC link.       Nursing Please include all hard scripts for controlled substances, med rec and dc summary, and AVS in packet.     Reviewed and confirmed with facility, [facility], can manage the patient care needs for the following:     Shane with (X) only those applicable:  Medication:  []Medications are available at the facility  [x]IV Antibiotics    []Controlled Substance - hard copies available sent.  []Weekly Labs    Equipment:  []CPAP/BiPAP  []Wound Vacuum  []Lewis or Urinary Device  []PICC/Central Line  []Nebulizer  []Ventilator    Treatment:  []Isolation (for MRSA, VRE, etc.)  []Surgical Drain Management  []Tracheostomy Care  []Dressing Changes  []Dialysis with transportation  []PEG Care  []Oxygen  []Daily Weights for Heart Failure    Dietary:  []Any diet limitations  []Tube Feedings   []Total Parenteral Management (TPN)    Financial Resources:  []Medicaid Application Completed    []UAI Completed and copy given to pt/family  and copy given to pt/family  [x]A screening has previously been completed.    []Level II Completed    [] Private pay individual who will not become   financially eligible for Medicaid within 6 months from admission to a [facility].     [] Individual refused to have screening conducted.     []Medicaid Application Completed    []The screening denied because it was determined individual did not need/did not qualify for nursing facility level of care.  [] Out of state residents seeking direct admission to a [facility] nursing facility.  [] Individuals who are inpatients of an out of state hospital, or in state or out of state veterans/ hospital and seek direct admission to

## 2024-07-09 NOTE — PLAN OF CARE
Problem: Discharge Planning  Goal: Discharge to home or other facility with appropriate resources  Outcome: Progressing     Problem: Pain  Goal: Verbalizes/displays adequate comfort level or baseline comfort level  Outcome: Progressing     Problem: Skin/Tissue Integrity  Goal: Absence of new skin breakdown  Description: 1.  Monitor for areas of redness and/or skin breakdown  2.  Assess vascular access sites hourly  3.  Every 4-6 hours minimum:  Change oxygen saturation probe site  4.  Every 4-6 hours:  If on nasal continuous positive airway pressure, respiratory therapy assess nares and determine need for appliance change or resting period.  Outcome: Progressing     Problem: ABCDS Injury Assessment  Goal: Absence of physical injury  Outcome: Progressing     Problem: Safety - Adult  Goal: Free from fall injury  Outcome: Progressing     Problem: Neurosensory - Adult  Goal: Achieves stable or improved neurological status  Outcome: Progressing  Goal: Absence of seizures  Outcome: Progressing  Goal: Remains free of injury related to seizures activity  Outcome: Progressing  Goal: Achieves maximal functionality and self care  Outcome: Progressing     Problem: Physical Therapy - Adult  Goal: By Discharge: Performs mobility at highest level of function for planned discharge setting.  See evaluation for individualized goals.  Description: FUNCTIONAL STATUS PRIOR TO ADMISSION: pt questionable historian, per chart review ambulated with RW at baseline    HOME SUPPORT PRIOR TO ADMISSION: The patient is resident at assisted living facility.    Physical Therapy Goals  Initiated 7/3/2024  1.  Patient will move from supine to sit and sit to supine, scoot up and down, and roll side to side in bed with supervision/set-up within 7 day(s).    2.  Patient will perform sit to stand with contact guard assist within 7 day(s).  3.  Patient will transfer from bed to chair and chair to bed with contact guard assist using the least restrictive
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
## (undated) DEVICE — PACK,BASIC,SIRUS,V: Brand: MEDLINE

## (undated) DEVICE — BIT DRL L330MM DIA4.2MM CALIB 100MM 3 FLUT QUIK CPL

## (undated) DEVICE — STERILE POLYISOPRENE POWDER-FREE SURGICAL GLOVES: Brand: PROTEXIS

## (undated) DEVICE — OPTIFOAM GENTLE SA, POSTOP, 4X8: Brand: MEDLINE

## (undated) DEVICE — KIT POS FOAM HANA TBL

## (undated) DEVICE — SUTURE VCRL SZ 2-0 L36IN ABSRB VLT L36MM CT-1 1/2 CIR J345H

## (undated) DEVICE — COVER,TABLE,60X90,STERILE: Brand: MEDLINE

## (undated) DEVICE — 6619 2 PTNT ISO SYS INCISE AREA&LT;(&GT;&&LT;)&GT;P: Brand: STERI-DRAPE™ IOBAN™ 2

## (undated) DEVICE — SOL IRR SOD CL 0.9% 1000ML BTL --

## (undated) DEVICE — INFECTION CONTROL KIT SYS

## (undated) DEVICE — SURGICAL PROCEDURE PACK BASIN MAJ SET CUST NO CAUT

## (undated) DEVICE — Device

## (undated) DEVICE — 3.2MM GUIDE WIRE 400MM

## (undated) DEVICE — SUTURE VCRL SZ 0 L27IN ABSRB VLT L36MM CT-1 1/2 CIR J340H

## (undated) DEVICE — GOWN,SIRUS,NONRNF,SETINSLV,XL,20/CS: Brand: MEDLINE

## (undated) DEVICE — PREP SKN CHLRAPRP APL 26ML STR --

## (undated) DEVICE — DRAPE XR C ARM 41X74IN LF --

## (undated) DEVICE — SOL IRR STRL H2O 1000ML BTL --